# Patient Record
Sex: MALE | Race: WHITE | NOT HISPANIC OR LATINO | Employment: OTHER | URBAN - METROPOLITAN AREA
[De-identification: names, ages, dates, MRNs, and addresses within clinical notes are randomized per-mention and may not be internally consistent; named-entity substitution may affect disease eponyms.]

---

## 2019-11-23 ENCOUNTER — HOSPITAL ENCOUNTER (EMERGENCY)
Facility: CLINIC | Age: 59
Discharge: HOME | End: 2019-11-23
Attending: EMERGENCY MEDICINE | Admitting: EMERGENCY MEDICINE
Payer: COMMERCIAL

## 2019-11-23 VITALS
OXYGEN SATURATION: 99 % | HEART RATE: 62 BPM | RESPIRATION RATE: 18 BRPM | TEMPERATURE: 97.8 F | DIASTOLIC BLOOD PRESSURE: 93 MMHG | SYSTOLIC BLOOD PRESSURE: 155 MMHG | WEIGHT: 230 LBS

## 2019-11-23 DIAGNOSIS — R10.9 ACUTE RIGHT FLANK PAIN: ICD-10-CM

## 2019-11-23 LAB
ALBUMIN UR-MCNC: NEGATIVE MG/DL
ANION GAP SERPL CALCULATED.3IONS-SCNC: 1 MMOL/L (ref 3–14)
APPEARANCE UR: CLEAR
BASOPHILS # BLD AUTO: 0 10E9/L (ref 0–0.2)
BASOPHILS NFR BLD AUTO: 0.5 %
BILIRUB UR QL STRIP: NEGATIVE
BUN SERPL-MCNC: 18 MG/DL (ref 7–30)
CALCIUM SERPL-MCNC: 8.6 MG/DL (ref 8.5–10.1)
CHLORIDE SERPL-SCNC: 106 MMOL/L (ref 94–109)
CO2 SERPL-SCNC: 31 MMOL/L (ref 20–32)
COLOR UR AUTO: YELLOW
CREAT SERPL-MCNC: 0.83 MG/DL (ref 0.66–1.25)
DIFFERENTIAL METHOD BLD: NORMAL
EOSINOPHIL # BLD AUTO: 0.1 10E9/L (ref 0–0.7)
EOSINOPHIL NFR BLD AUTO: 1.5 %
ERYTHROCYTE [DISTWIDTH] IN BLOOD BY AUTOMATED COUNT: 12.4 % (ref 10–15)
GFR SERPL CREATININE-BSD FRML MDRD: >90 ML/MIN/{1.73_M2}
GLUCOSE SERPL-MCNC: 90 MG/DL (ref 70–99)
GLUCOSE UR STRIP-MCNC: NEGATIVE MG/DL
HCT VFR BLD AUTO: 42.7 % (ref 40–53)
HGB BLD-MCNC: 14 G/DL (ref 13.3–17.7)
HGB UR QL STRIP: NEGATIVE
IMM GRANULOCYTES # BLD: 0 10E9/L (ref 0–0.4)
IMM GRANULOCYTES NFR BLD: 0.5 %
KETONES UR STRIP-MCNC: NEGATIVE MG/DL
LEUKOCYTE ESTERASE UR QL STRIP: NEGATIVE
LYMPHOCYTES # BLD AUTO: 2.7 10E9/L (ref 0.8–5.3)
LYMPHOCYTES NFR BLD AUTO: 43.7 %
MCH RBC QN AUTO: 30.2 PG (ref 26.5–33)
MCHC RBC AUTO-ENTMCNC: 32.8 G/DL (ref 31.5–36.5)
MCV RBC AUTO: 92 FL (ref 78–100)
MONOCYTES # BLD AUTO: 0.4 10E9/L (ref 0–1.3)
MONOCYTES NFR BLD AUTO: 6.2 %
NEUTROPHILS # BLD AUTO: 2.9 10E9/L (ref 1.6–8.3)
NEUTROPHILS NFR BLD AUTO: 47.6 %
NITRATE UR QL: NEGATIVE
NRBC # BLD AUTO: 0 10*3/UL
NRBC BLD AUTO-RTO: 0 /100
PH UR STRIP: 6 PH (ref 5–7)
PLATELET # BLD AUTO: 270 10E9/L (ref 150–450)
POTASSIUM SERPL-SCNC: 4.3 MMOL/L (ref 3.4–5.3)
RBC # BLD AUTO: 4.63 10E12/L (ref 4.4–5.9)
SODIUM SERPL-SCNC: 138 MMOL/L (ref 133–144)
SOURCE: NORMAL
SP GR UR STRIP: 1.01 (ref 1–1.03)
UROBILINOGEN UR STRIP-MCNC: 0 MG/DL (ref 0–2)
WBC # BLD AUTO: 6.2 10E9/L (ref 4–11)

## 2019-11-23 PROCEDURE — 80048 BASIC METABOLIC PNL TOTAL CA: CPT | Performed by: EMERGENCY MEDICINE

## 2019-11-23 PROCEDURE — 99283 EMERGENCY DEPT VISIT LOW MDM: CPT | Performed by: EMERGENCY MEDICINE

## 2019-11-23 PROCEDURE — 85025 COMPLETE CBC W/AUTO DIFF WBC: CPT | Performed by: EMERGENCY MEDICINE

## 2019-11-23 PROCEDURE — 81003 URINALYSIS AUTO W/O SCOPE: CPT | Performed by: EMERGENCY MEDICINE

## 2019-11-23 PROCEDURE — 99284 EMERGENCY DEPT VISIT MOD MDM: CPT | Mod: Z6 | Performed by: EMERGENCY MEDICINE

## 2019-11-23 RX ORDER — HYDROCODONE BITARTRATE AND ACETAMINOPHEN 5; 325 MG/1; MG/1
1 TABLET ORAL EVERY 8 HOURS PRN
Qty: 7 TABLET | Refills: 0 | Status: SHIPPED | OUTPATIENT
Start: 2019-11-23 | End: 2023-03-09

## 2019-11-23 RX ORDER — METHYLPREDNISOLONE 4 MG
TABLET, DOSE PACK ORAL
Qty: 21 TABLET | Refills: 0 | Status: SHIPPED | OUTPATIENT
Start: 2019-11-23 | End: 2022-08-24

## 2019-11-23 ASSESSMENT — ENCOUNTER SYMPTOMS
PSYCHIATRIC NEGATIVE: 1
CARDIOVASCULAR NEGATIVE: 1
CONSTITUTIONAL NEGATIVE: 1
NEUROLOGICAL NEGATIVE: 1
GASTROINTESTINAL NEGATIVE: 1
RESPIRATORY NEGATIVE: 1
ALLERGIC/IMMUNOLOGIC NEGATIVE: 1
HEMATOLOGIC/LYMPHATIC NEGATIVE: 1
ENDOCRINE NEGATIVE: 1
EYES NEGATIVE: 1
FLANK PAIN: 1

## 2019-11-23 NOTE — ED AVS SNAPSHOT
[facility] Emergency Department  [address] [address]  Phone:  [phone]  Fax:  [phone]                                    Ren Yung   MRN: [id number]    Department:  [facility] Emergency Department   Date of Visit:  11/23/2019           After Visit Summary Signature Page    I have received my discharge instructions, and my questions have been answered. I have discussed any challenges I see with this plan with the nurse or doctor.    ..........................................................................................................................................  Patient/Patient Representative Signature      ..........................................................................................................................................  Patient Representative Print Name and Relationship to Patient    ..................................................               ................................................  Date                                   Time    ..........................................................................................................................................  Reviewed by Signature/Title    ...................................................              ..............................................  Date                                               Time          22EPIC Rev 08/18

## 2019-11-23 NOTE — ED PROVIDER NOTES
History     Chief Complaint   Patient presents with     Flank Pain     x 1.5 weeks. no injury     HPI  Ren Yung is a 58 year old male who presents to ED for right low back pain. Patient states that this has been going on for 1.5  weeks. Pt states that the pain started at work while he was bending over trying to pull equipment. Patient states that the pain is worse when he bends over and is better when he sits up. Pt denies history of kidney stone. Pt denies nausea, vomiting, hematuria, dysuria, bowel or bladder incontinence, saddle anesthesia, abdominal pain or any other symptoms. Patient states he took Advil and had mild alleviation.     Social History: Pt presents to ED via private vehicle alone. Patient lives in [place], [place]. Patient works in [employer].      Allergies:  Allergies no known allergies    Problem List:    There are no active problems to display for this patient.       Past Medical History:    No past medical history on file.    Past Surgical History:    No past surgical history on file.    Family History:    No family history on file.    Social History:  Marital Status:    Social History     Tobacco Use     Smoking status: Not on file   Substance Use Topics     Alcohol use: Not on file     Drug use: Not on file        Medications:    HYDROcodone-acetaminophen (NORCO) 5-325 MG tablet  methylPREDNISolone (MEDROL DOSEPAK) 4 MG tablet therapy pack          Review of Systems   Constitutional: Negative.    HENT: Negative.    Eyes: Negative.    Respiratory: Negative.    Cardiovascular: Negative.    Gastrointestinal: Negative.    Endocrine: Negative.    Genitourinary: Positive for flank pain.   Skin: Negative.    Allergic/Immunologic: Negative.    Neurological: Negative.    Hematological: Negative.    Psychiatric/Behavioral: Negative.    All other systems reviewed and are negative.      Physical Exam   BP: (!) 155/93  Pulse: 62  Temp: 97.8  F (36.6  C)  Resp: 18  Weight: 104.3 kg (230 lb)  SpO2: 99  %      Physical Exam  Constitutional:       General: He is not in acute distress.     Appearance: He is normal weight. He is not ill-appearing, toxic-appearing or diaphoretic.   HENT:      Head: Normocephalic and atraumatic.      Nose: Nose normal. No congestion or rhinorrhea.      Mouth/Throat:      Pharynx: Oropharynx is clear. No oropharyngeal exudate or posterior oropharyngeal erythema.   Eyes:      General: No scleral icterus.        Right eye: No discharge.         Left eye: No discharge.      Extraocular Movements: Extraocular movements intact.      Conjunctiva/sclera: Conjunctivae normal.      Pupils: Pupils are equal, round, and reactive to light.   Neck:      Musculoskeletal: Normal range of motion. No neck rigidity or muscular tenderness.      Vascular: No carotid bruit.   Cardiovascular:      Rate and Rhythm: Normal rate and regular rhythm.      Pulses: Normal pulses.      Heart sounds: No murmur. No friction rub. No gallop.    Pulmonary:      Effort: Pulmonary effort is normal. No respiratory distress.      Breath sounds: Normal breath sounds. No stridor. No wheezing, rhonchi or rales.   Chest:      Chest wall: No tenderness.   Abdominal:      General: Abdomen is flat. Bowel sounds are normal. There is no distension.      Palpations: There is no mass.      Tenderness: There is no abdominal tenderness. There is no right CVA tenderness, left CVA tenderness, guarding or rebound.      Hernia: No hernia is present.   Musculoskeletal:         General: Tenderness present. No swelling, deformity or signs of injury.      Lumbar back: He exhibits pain.        Back:    Lymphadenopathy:      Cervical: No cervical adenopathy.   Skin:     Capillary Refill: Capillary refill takes less than 2 seconds.      Coloration: Skin is not jaundiced or pale.      Findings: No bruising, erythema, lesion or rash.   Neurological:      General: No focal deficit present.      Mental Status: He is alert.      Cranial Nerves: No  cranial nerve deficit.      Sensory: No sensory deficit.      Motor: No weakness.      Coordination: Coordination normal.      Gait: Gait normal.      Deep Tendon Reflexes: Reflexes normal.   Psychiatric:         Mood and Affect: Mood normal.         Behavior: Behavior normal.         Thought Content: Thought content normal.         Judgment: Judgment normal.         ED Course        Procedures               Critical Care time:  none               ED medications: none      ED Vitals:  Vitals:    11/23/19 1158   BP: (!) 155/93   Pulse: 62   Resp: 18   Temp: 97.8  F (36.6  C)   TempSrc: Temporal   SpO2: 99%   Weight: 104.3 kg (230 lb)     ED labs and imaging:  Results for orders placed or performed during the hospital encounter of 11/23/19 (from the past 24 hour(s))   CBC with platelets differential   Result Value Ref Range    WBC 6.2 4.0 - 11.0 10e9/L    RBC Count 4.63 4.4 - 5.9 10e12/L    Hemoglobin 14.0 13.3 - 17.7 g/dL    Hematocrit 42.7 40.0 - 53.0 %    MCV 92 78 - 100 fl    MCH 30.2 26.5 - 33.0 pg    MCHC 32.8 31.5 - 36.5 g/dL    RDW 12.4 10.0 - 15.0 %    Platelet Count 270 150 - 450 10e9/L    Diff Method Automated Method     % Neutrophils 47.6 %    % Lymphocytes 43.7 %    % Monocytes 6.2 %    % Eosinophils 1.5 %    % Basophils 0.5 %    % Immature Granulocytes 0.5 %    Nucleated RBCs 0 0 /100    Absolute Neutrophil 2.9 1.6 - 8.3 10e9/L    Absolute Lymphocytes 2.7 0.8 - 5.3 10e9/L    Absolute Monocytes 0.4 0.0 - 1.3 10e9/L    Absolute Eosinophils 0.1 0.0 - 0.7 10e9/L    Absolute Basophils 0.0 0.0 - 0.2 10e9/L    Abs Immature Granulocytes 0.0 0 - 0.4 10e9/L    Absolute Nucleated RBC 0.0    Basic metabolic panel   Result Value Ref Range    Sodium 138 133 - 144 mmol/L    Potassium 4.3 3.4 - 5.3 mmol/L    Chloride 106 94 - 109 mmol/L    Carbon Dioxide 31 20 - 32 mmol/L    Anion Gap 1 (L) 3 - 14 mmol/L    Glucose 90 70 - 99 mg/dL    Urea Nitrogen 18 7 - 30 mg/dL    Creatinine 0.83 0.66 - 1.25 mg/dL    GFR Estimate >90  >60 mL/min/[1.73_m2]    GFR Estimate If Black >90 >60 mL/min/[1.73_m2]    Calcium 8.6 8.5 - 10.1 mg/dL   UA reflex to Microscopic   Result Value Ref Range    Color Urine Yellow     Appearance Urine Clear     Glucose Urine Negative NEG^Negative mg/dL    Bilirubin Urine Negative NEG^Negative    Ketones Urine Negative NEG^Negative mg/dL    Specific Gravity Urine 1.012 1.003 - 1.035    Blood Urine Negative NEG^Negative    pH Urine 6.0 5.0 - 7.0 pH    Protein Albumin Urine Negative NEG^Negative mg/dL    Urobilinogen mg/dL 0.0 0.0 - 2.0 mg/dL    Nitrite Urine Negative NEG^Negative    Leukocyte Esterase Urine Negative NEG^Negative    Source Midstream Urine          Assessments & Plan (with Medical Decision Making)   Clinical impression: 58-year-old male who presented for evaluation for a 10-day history of right  flank pain and discomfort.  The cause of his discomfort as described is not clear clinical suspicion is that his pain is likely musculoskeletal in nature with his report that his symptoms began after he bent over to disengage a motor while at work. Patient is a  of a machine [employer].  He reported his symptoms began right after bending over while at work a week and a half ago.  No personal or  family history of kidney stones.  Takes no active prescriptions.  He had no extremity weakness or numbness or difficulty with urinating or defecating and no urinary symptoms.  He has had no fever or chills and no unintentional weight loss.  On exam he was sitting comfortably on the gurney he was somewhat hypertensive on arrival in triage blood pressure 135/93 he was afebrile.  He had no CVA tenderness.  Straight leg raise bilaterally was negativ. He had symmetric lower extremity strength and sensation      ED Course and Plan:  We had a discussion about possible causes of atraumatic right flank pain and discomfort including musculoskeletal causes such as muscle strain, we also discussed the possibility of  lumbar disc disease including herniation, bulge.  His exam was not concerning for sciatica or nerve impingement as he reported no neurologic deficits including weakness or numbness.  We discussed that he could have a kidney stone although his midstream urinalysis was negative for hematuria.  Patient was comfortable going home with a trial of supportive care watchful waiting for further management.  We discussed imaging options including CT and MRI.  At this time we both agree that supportive care measures were reasonable including applying heat to his flank and back which he reported had provided some relief.    He is discharged home with pain medication for comfort x3 days and a Medrol Dosepak.  We discussed that if he develops additional symptoms of concern including but not limited to urinary symptoms, increasing pain difficulty urinating or defecating he should return to the department for further care.  My suspicion that he has an aortic aneurysm is low.            Disclaimer: This note consists of symbols derived from keyboarding, dictation and/or voice recognition software. As a result, there may be errors in the script that have gone undetected. Please consider this when interpreting information found in this chart.  I have reviewed the nursing notes.    I have reviewed the findings, diagnosis, plan and need for follow up with the patient.       New Prescriptions    HYDROCODONE-ACETAMINOPHEN (NORCO) 5-325 MG TABLET    Take 1 tablet by mouth every 8 hours as needed for moderate to severe pain    METHYLPREDNISOLONE (MEDROL DOSEPAK) 4 MG TABLET THERAPY PACK    Follow Package Directions       Final diagnoses:   Acute right flank pain - over the last 1.5 weeks after bending over to dis-engage a motor while at work       11/23/2019   [facility] EMERGENCY DEPARTMENT     Viral Felipe MD  11/24/19 0031

## 2019-11-23 NOTE — DISCHARGE INSTRUCTIONS
1)The cause of your right flank pain is described and reported is not clear with suspect it is likely due to a musculoskeletal process such as a muscle strain.  We have also discussed possible disc related problems such as herniation and bulge.  At this time would agree to allow you to go home with pain medication, rest, heat, and short course of steroids-Medrol Dosepak to see if this will improve or help your pain and discomfort     2) if your pain does not improve in the next week or you develop new symptoms of concern including fever or difficulty urinating creasing pain or discomfort he should make a clinic appointment for additional imaging such as MRI and/or CT return to the department to be reevaluated

## 2020-07-22 ENCOUNTER — TRANSCRIBE ORDERS (OUTPATIENT)
Dept: OTHER | Age: 60
End: 2020-07-22

## 2020-07-22 DIAGNOSIS — L30.9 DERMATITIS: Primary | ICD-10-CM

## 2020-07-29 ENCOUNTER — TELEPHONE (OUTPATIENT)
Dept: DERMATOLOGY | Facility: CLINIC | Age: 60
End: 2020-07-29

## 2020-08-06 ENCOUNTER — VIRTUAL VISIT (OUTPATIENT)
Dept: DERMATOLOGY | Facility: CLINIC | Age: 60
End: 2020-08-06
Payer: COMMERCIAL

## 2020-08-06 DIAGNOSIS — R21 RASH: Primary | ICD-10-CM

## 2020-08-06 RX ORDER — EPINEPHRINE 0.3 MG/.3ML
INJECTION SUBCUTANEOUS
COMMUNITY
Start: 2020-06-17

## 2020-08-06 NOTE — NURSING NOTE
Dermatology Rooming Note    Ren Yung's goals for this visit include:   Chief Complaint   Patient presents with     Derm Problem     Ren is being seen today for a consult on dermatitis on his whole body      PRESLEY Billy

## 2020-08-06 NOTE — PROGRESS NOTES
"[facility] Dermatology Record:  Store and Forward and Video ( Invitation sent by:  Leighton and text to cell phone:   )      Dermatology Problem List:  1. Diffuse rash on bilateral arms, bilateral legs, abdomen, and flanks  - Ddx: Mastocytosis versus folliculitis versus drug eruption  - Will obtain biopsy on 8/14/20 (date may change depending on patient's work schedule)  - Prior tx: Prednisone (60mg x3 days,40x3,20x3)    2. Inflamed Keratosis Pilaris versus Folliculitis  - Last evaluated by Dr. Rogers on 09/15/2015  - Previous tx: Urea cream, diflucan     Encounter Date: Aug 6, 2020    CC:   Chief Complaint   Patient presents with     Derm Problem     Ren is being seen today for a consult on dermatitis on his whole body        History of Present Illness:  Ren Yung is a 59 year old male with a past medical history significant for inflamed keratosis pilaris vs. folliculitis (treated with urea cream and diflucan) who presents for evaluation of a rash.    Patient states that his inflamed keratosis pilaris was constant on his lower shins and asymptomatic, but this changed ~1 year ago, when the rash spread to his abdomen, flanks, and arms. Compared to his previous rash, thus current rash is intensely itchy and when he scratches his body his skin will welt up and burn. He denies any bleeding. He tried cerave and \"non-itch\" lotions, which have not reduced his symptoms.     He saw his family medicine provider, Dr. Barrera on 7/17/20, who started him on oral prednisone (60 mg x 3 days, 40 mg x 3 days, 20 mg x 3 days). This helped calmed the itch slightly, but has not resolved his symptoms.     He denies any fevers or any muscle aches/pains, lip or tongue swelling, or difficulty breathing. Before taking prednisone, he had not taken any medications for multiple years. Of note, his daughter was sick with a fever one week ago, but tested negative for COVID-19.       ROS: Patient is generally feeling well today/  Skin: As per " HPI.  No recent fevers or chills.  No cough or SOB.    Physical Examination:  General: Well-appearing, appropriately-developed individual.  Skin: Focused examination including of the patient-submitted images was performed.   -Diffuse pink-to-red erythematous perifolicular macules and flat-topped papules throughout the bilateral arms, legs, abdomen, and flanks  - Some pink erythematous macules are coalescing into patches on the chest and abdomen.    Labs:  No new labs to report.    Past Medical History:   There is no problem list on file for this patient.    No past medical history on file.  Past Surgical History:   Procedure Laterality Date     COLONOSCOPY  4/18/2013    Procedure: COLONOSCOPY;  Colonoscopy;  Surgeon: Erick Shahid MD;  [facility]       Social History:  Patient reports that he has never smoked. He has never used smokeless tobacco. He reports current alcohol use.     Family History:  Family History   Problem Relation Age of Onset     Melanoma No family hx of        Medications:  Current Outpatient Medications   Medication     EPINEPHrine (ANY BX GENERIC EQUIV) 0.3 MG/0.3ML injection 2-pack     Acetaminophen (TYLENOL PO)     ammonium lactate (LAC-HYDRIN) 12 % cream     cetirizine (ZYRTEC) 10 MG tablet     doxycycline (VIBRAMYCIN) 100 MG capsule     fluconazole (DIFLUCAN) 150 MG tablet     fluconazole (DIFLUCAN) 150 MG tablet     HYDROcodone-acetaminophen (NORCO) 5-325 MG tablet     IBUPROFEN PO     methylPREDNISolone (MEDROL DOSEPAK) 4 MG tablet therapy pack     Sildenafil Citrate (VIAGRA PO)     tamsulosin (FLOMAX) 0.4 MG 24 hr capsule     triamcinolone (KENALOG) 0.1 % cream     Urea (CARMOL 20) 20 % CREA     No current facility-administered medications for this visit.           Allergies   Allergen Reactions     Bee Venom Other (See Comments)     Vision disturbances     Nkda [No Known Drug Allergies]          Impression and Recommendations (Patient Counseled on the Following):  1. Diffuse  rash on bilateral arms, bilateral legs, abdomen, and flanks    Ddx: Mastocytosis versus folliculitis versus drug eruption    Patient presents with a year-long eruption of pink erythematous and perifollicular macules throughout his body, with additional dermatographism. He has not taken medications for years, prior to starting prednisone last month. These symptoms suggest a mastocytosis, but also in the differential is folliculitis (given his history of chronic folliculitis) and drug eruption (although his medication use history is negative). At this point, patient will need to follow up in one week for a biopsy to confirm this diagnosis.     - Return in one week (next Friday based on patient's work schedule) for biopsy. This can be performed by myself or by a different provider.     Follow-up:   Follow-up with dermatology in approximately eight days for biopsy.     Staff and medical student    Zeeshan Paulson, MS4    I was present with the medical student who participated in the interview and in the documentation.  I have verified the history, reviewed all submitted images and personally performed the medical decision making.  I agree with the assessment and plan of care as documented in the note.     Juan Luis Prakash MD  Dermatology Attending    _____________________________________________________________________________    Teledermatology information:  - Location of patient: [place]  - Patient presented as: provider referral  - Location of teledermatologist:  [facility] DERMATOLOGY )  - Reason teledermatology is appropriate:  patient unable to obtain appointment for acute issue (next clinic opening too far away)    - Image quality and interpretability: acceptable  - Physician has received verbal consent for a Video/Photos Visit from the patient? Yes  - In-person dermatology visit recommendation: yes - for biopsy  - Date of images: 08/06/2020  - Service start time:1:03   - Service end time:1:35  - Date of  report: 8/6/2020

## 2020-08-06 NOTE — PROGRESS NOTES
[facility] Dermatology Record:  Mychart Connected      Dermatology Problem List:  ***    Encounter Date: Aug 6, 2020    CC:   Chief Complaint   Patient presents with     Derm Problem     Ren is being seen today for a consult on dermatitis on his whole body        History of Present Illness:  Ren Yung is a 59 year old male who presents for ***.      ROS: Patient is generally feeling well today ***    Physical Examination:  General: Well-appearing***, appropriately-developed individual.  Skin: Focused examination including *** was performed.   -***    Labs:  ***    Past Medical History:   There is no problem list on file for this patient.    No past medical history on file.  Past Surgical History:   Procedure Laterality Date     COLONOSCOPY  4/18/2013    Procedure: COLONOSCOPY;  Colonoscopy;  Surgeon: Erick Shahid MD;  Location: [facility]       Social History:  Patient reports that he has never smoked. He has never used smokeless tobacco. He reports current alcohol use.    Family History:  Family History   Problem Relation Age of Onset     Melanoma No family hx of        Medications:  Current Outpatient Medications   Medication     EPINEPHrine (ANY BX GENERIC EQUIV) 0.3 MG/0.3ML injection 2-pack     Acetaminophen (TYLENOL PO)     ammonium lactate (LAC-HYDRIN) 12 % cream     cetirizine (ZYRTEC) 10 MG tablet     doxycycline (VIBRAMYCIN) 100 MG capsule     fluconazole (DIFLUCAN) 150 MG tablet     fluconazole (DIFLUCAN) 150 MG tablet     HYDROcodone-acetaminophen (NORCO) 5-325 MG tablet     IBUPROFEN PO     methylPREDNISolone (MEDROL DOSEPAK) 4 MG tablet therapy pack     Sildenafil Citrate (VIAGRA PO)     tamsulosin (FLOMAX) 0.4 MG 24 hr capsule     triamcinolone (KENALOG) 0.1 % cream     Urea (CARMOL 20) 20 % CREA     No current facility-administered medications for this visit.           Allergies   Allergen Reactions     Bee Venom Other (See Comments)     Vision disturbances     Nkda [No Known Drug Allergies]   "          Impression and Recommendations (Patient Counseled on the Following):  1. ***  -{plan:748533}    2. ***  -{plan:946725}      Follow-up:   {follow-up:568072}     {umdermtelestaffinvolved:526368::\"Staff only\"}    ***  _____________________________________________________________________________    Teledermatology information:  - Location of patient: [place]  - Patient presented as: {self referral other:080852::\"return\"}  - Location of teledermatologist:  ([facility] DERMATOLOGY )  - Reason teledermatology is appropriate:  {umdermtelereason:024729}  - Image quality and interpretability: {imagequality:262863}  - Physician has received verbal consent for a Video/Photos Visit from the patient? {YES-NO  Default Yes:4444::\"Yes\"}  - In-person dermatology visit recommendation: {visit needed:377742}  - Date of images: ***  - Service start time:***  - Service end time:***  - Date of report: 8/6/2020   "

## 2020-08-06 NOTE — PATIENT INSTRUCTIONS
[facility] Dermatology Visit    Thank you for allowing us to participate in your care.     When should I call my doctor?    If you are worsening or not improving, please, contact us or seek urgent care as noted below.     Who should I call with questions (adults)?    [facility] (adult and pediatric): [phone]     [facility] (adult): [phone]    For urgent needs outside of business hours call the [facility] at [phone] and ask for the dermatology resident on call    If this is a medical emergency and you are unable to reach an ER, Call 911      Who should I call with questions (pediatric)?  [facility]- Pediatric Dermatology  Dr. Kristel Lynn, Dr. Thom Juarez, Dr. Huong García, Helena Carter, VICTOR MANUEL Uribe, Dr. Dorinda Barcenas & Dr. Eliud Parker  Non Urgent  Nurse Triage Line; [phone]- Manuela and Kaitlin DECKER Care Coordinatorsharri Moeller (/Complex ) [phone]    If you need a prescription refill, please contact your pharmacy. Refills are approved or denied by our Physicians during normal business hours, Monday through Fridays  Per office policy, refills will not be granted if you have not been seen within the past year (or sooner depending on your child's condition)    Scheduling Information:  Pediatric Appointment Scheduling and Call Center [phone]  Radiology Scheduling- [phone]  Sedation Unit Scheduling- [phone]  [place] Scheduling- General [phone]; Pediatric Dermatology [phone]  Main  Services: [phone]  Estonian: [phone]  Argentine: [phone]  Hmong/Dharmesh/Cook Islander: [phone]  Preadmission Nursing Department Fax Number: [phone] (Fax all pre-operative paperwork to this number)    For urgent matters arising during evenings, weekends, or holidays that cannot wait for normal business hours  please call [phone] and ask for the Dermatology Resident On-Call to be paged.

## 2020-08-06 NOTE — LETTER
"8/6/2020       RE: Ren Yung  [address]  [facility] [address]     Dear Colleague,    Thank you for referring your patient, Ren Yung, to the [facility] DERMATOLOGY at [facility]. Please see a copy of my visit note below.    [facility] Dermatology Record:  Store and Forward and Video ( Invitation sent by:  Leighton and text to cell phone:   )      Dermatology Problem List:  1. Diffuse rash on bilateral arms, bilateral legs, abdomen, and flanks  - Ddx: Mastocytosis versus folliculitis versus drug eruption  - Will obtain biopsy on 8/14/20 (date may change depending on patient's work schedule)  - Prior tx: Prednisone (60mg x3 days,40x3,20x3)    2. Inflamed Keratosis Pilaris versus Folliculitis  - Last evaluated by Dr. Rogers on 09/15/2015  - Previous tx: Urea cream, diflucan     Encounter Date: Aug 6, 2020    CC:   Chief Complaint   Patient presents with     Derm Problem     Ren is being seen today for a consult on dermatitis on his whole body        History of Present Illness:  Ren Yung is a 59 year old male with a past medical history significant for inflamed keratosis pilaris vs. folliculitis (treated with urea cream and diflucan) who presents for evaluation of a rash.    Patient states that his inflamed keratosis pilaris was constant on his lower shins and asymptomatic, but this changed ~1 year ago, when the rash spread to his abdomen, flanks, and arms. Compared to his previous rash, thus current rash is intensely itchy and when he scratches his body his skin will welt up and burn. He denies any bleeding. He tried cerave and \"non-itch\" lotions, which have not reduced his symptoms.     He saw his family medicine provider, Dr. Barrera on 7/17/20, who started him on oral prednisone (60 mg x 3 days, 40 mg x 3 days, 20 mg x 3 days). This helped calmed the itch slightly, but has not resolved his symptoms.     He denies any fevers or any muscle aches/pains, lip " or tongue swelling, or difficulty breathing. Before taking prednisone, he had not taken any medications for multiple years. Of note, his daughter was sick with a fever one week ago, but tested negative for COVID-19.       ROS: Patient is generally feeling well today/  Skin: As per HPI.  No recent fevers or chills.  No cough or SOB.    Physical Examination:  General: Well-appearing, appropriately-developed individual.  Skin: Focused examination including of the patient-submitted images was performed.   -Diffuse pink-to-red erythematous perifolicular macules and flat-topped papules throughout the bilateral arms, legs, abdomen, and flanks  - Some pink erythematous macules are coalescing into patches on the chest and abdomen.    Labs:  No new labs to report.    Past Medical History:   There is no problem list on file for this patient.    No past medical history on file.  Past Surgical History:   Procedure Laterality Date     COLONOSCOPY  4/18/2013    Procedure: COLONOSCOPY;  Colonoscopy;  Surgeon: Erick Shahid MD;  Location: [facility]       Social History:  Patient reports that he has never smoked. He has never used smokeless tobacco. He reports current alcohol use.     Family History:  Family History   Problem Relation Age of Onset     Melanoma No family hx of        Medications:  Current Outpatient Medications   Medication     EPINEPHrine (ANY BX GENERIC EQUIV) 0.3 MG/0.3ML injection 2-pack     Acetaminophen (TYLENOL PO)     ammonium lactate (LAC-HYDRIN) 12 % cream     cetirizine (ZYRTEC) 10 MG tablet     doxycycline (VIBRAMYCIN) 100 MG capsule     fluconazole (DIFLUCAN) 150 MG tablet     fluconazole (DIFLUCAN) 150 MG tablet     HYDROcodone-acetaminophen (NORCO) 5-325 MG tablet     IBUPROFEN PO     methylPREDNISolone (MEDROL DOSEPAK) 4 MG tablet therapy pack     Sildenafil Citrate (VIAGRA PO)     tamsulosin (FLOMAX) 0.4 MG 24 hr capsule     triamcinolone (KENALOG) 0.1 % cream     Urea (CARMOL 20) 20 % CREA     No  current facility-administered medications for this visit.           Allergies   Allergen Reactions     Bee Venom Other (See Comments)     Vision disturbances     Nkda [No Known Drug Allergies]          Impression and Recommendations (Patient Counseled on the Following):  1. Diffuse rash on bilateral arms, bilateral legs, abdomen, and flanks    Ddx: Mastocytosis versus folliculitis versus drug eruption    Patient presents with a year-long eruption of pink erythematous and perifollicular macules throughout his body, with additional dermatographism. He has not taken medications for years, prior to starting prednisone last month. These symptoms suggest a mastocytosis, but also in the differential is folliculitis (given his history of chronic folliculitis) and drug eruption (although his medication use history is negative). At this point, patient will need to follow up in one week for a biopsy to confirm this diagnosis.     - Return in one week (next Friday based on patient's work schedule) for biopsy. This can be performed by myself or by a different provider.     Follow-up:   Follow-up with dermatology in approximately eight days for biopsy.     Staff and medical student    Zeeshan Paulson, MS4    I was present with the medical student who participated in the interview and in the documentation.  I have verified the history, reviewed all submitted images and personally performed the medical decision making.  I agree with the assessment and plan of care as documented in the note.     Juan Luis Prakash MD  Dermatology Attending    _____________________________________________________________________________    Teledermatology information:  - Location of patient: [place]  - Patient presented as: provider referral  - Location of teledermatologist:  ([facility] DERMATOLOGY )  - Reason teledermatology is appropriate:  patient unable to obtain appointment for acute issue (next clinic opening too far away)    - Image quality  and interpretability: acceptable  - Physician has received verbal consent for a Video/Photos Visit from the patient? Yes  - In-person dermatology visit recommendation: yes - for biopsy  - Date of images: 08/06/2020  - Service start time:1:03   - Service end time:1:35  - Date of report: 8/6/2020       Again, thank you for allowing me to participate in the care of your patient.      Sincerely,    Juan Luis Prakash MD

## 2020-08-11 ENCOUNTER — OFFICE VISIT (OUTPATIENT)
Dept: DERMATOLOGY | Facility: CLINIC | Age: 60
End: 2020-08-11
Payer: COMMERCIAL

## 2020-08-11 DIAGNOSIS — R21 RASH: ICD-10-CM

## 2020-08-11 DIAGNOSIS — D47.09 MASTOCYTOSIS: ICD-10-CM

## 2020-08-11 DIAGNOSIS — R21 RASH: Primary | ICD-10-CM

## 2020-08-11 RX ORDER — TRIAMCINOLONE ACETONIDE 1 MG/G
OINTMENT TOPICAL 2 TIMES DAILY
Qty: 80 G | Refills: 0 | Status: SHIPPED | OUTPATIENT
Start: 2020-08-11 | End: 2022-08-24

## 2020-08-11 ASSESSMENT — PAIN SCALES - GENERAL
PAINLEVEL: NO PAIN (0)
PAINLEVEL: NO PAIN (0)

## 2020-08-11 NOTE — NURSING NOTE
Chief Complaint   Patient presents with     Derm Problem     Ren is here today for a biopsy.      BETSY YODER on 8/11/2020 at 10:56 AM

## 2020-08-11 NOTE — PROGRESS NOTES
[facility] Dermatology Note      Dermatology Problem List:  1. Pruritic follicular based macular rash suspicious for mastocytosis.   - s/p biopsy 8/11/2020  - prior tx: prednisone taper   2. Inflamed KP vs folliculitis, last evaluated by Dr. Rogers on 9/15/2015.    - prior tx: urea cream, diflucan     Encounter Date: Aug 11, 2020    CC:   Chief Complaint   Patient presents with     Derm Problem     Ren is here today for a biopsy.          History of Present Illness:  Mr. Ren Yung is a 59 year old male who presents for biopsy of rash.  Seen via telederm onf 8/6/2020.  Patient has had ~3 year history of inflamed keratosis pilaris vs folliculitis treated with urea cream and diflucan.  In the past year, his rash spread to his abdomen, flanks and arms.  It feels extremely itchy and burning.      Of note, he has had a severe reaction to a bee sting many years ago.     Patient has not been taking any medications for the past year or more except for the methylprednisolone which he stopped taking 2 weeks ago.      Denies any fevers, cough, shortness of breath, throat swelling, muscle aches or pains, lip or tongue swelling.     Past Medical History:   There is no problem list on file for this patient.    No past medical history on file.  Past Surgical History:   Procedure Laterality Date     COLONOSCOPY  4/18/2013    Procedure: COLONOSCOPY;  Colonoscopy;  Surgeon: Erick Shahid MD;  Location: [facility]       Social History:  Patient reports that he has never smoked. He has never used smokeless tobacco. He reports current alcohol use.    Family History:  Family History   Problem Relation Age of Onset     Melanoma No family hx of        Medications:  Current Outpatient Medications   Medication Sig Dispense Refill     EPINEPHrine (ANY BX GENERIC EQUIV) 0.3 MG/0.3ML injection 2-pack INJECT 0.3MG IM ONCE AS A SINGLE DOSE       IBUPROFEN PO Take 200 mg by mouth.       Acetaminophen (TYLENOL PO) Take   by mouth.       ammonium lactate (LAC-HYDRIN) 12 % cream Apply topically 2 times daily as needed for dry skin (Patient not taking: Reported on 8/6/2020) 385 g 3     cetirizine (ZYRTEC) 10 MG tablet Take 1 tablet (10 mg) by mouth every evening (Patient not taking: Reported on 8/6/2020) 30 tablet 1     doxycycline (VIBRAMYCIN) 100 MG capsule Take 1 capsule (100 mg) by mouth daily with food (Patient not taking: Reported on 8/6/2020) 30 capsule 0     fluconazole (DIFLUCAN) 150 MG tablet Take 1 tablet (150 mg) by mouth daily (Patient not taking: Reported on 8/6/2020) 14 tablet 0     fluconazole (DIFLUCAN) 150 MG tablet Take 1 tablet (150 mg) by mouth once a week (Patient not taking: Reported on 8/6/2020) 4 tablet 0     HYDROcodone-acetaminophen (NORCO) 5-325 MG tablet Take 1 tablet by mouth every 8 hours as needed for moderate to severe pain (Patient not taking: Reported on 8/6/2020) 7 tablet 0     methylPREDNISolone (MEDROL DOSEPAK) 4 MG tablet therapy pack Follow Package Directions (Patient not taking: Reported on 8/6/2020) 21 tablet 0     Sildenafil Citrate (VIAGRA PO) Take 100 mg by mouth.       tamsulosin (FLOMAX) 0.4 MG 24 hr capsule Take 1 capsule (0.4 mg) by mouth daily (Patient not taking: Reported on 8/6/2020) 30 capsule 3     triamcinolone (KENALOG) 0.1 % cream Apply sparingly to affected area at bedtime (Patient not taking: Reported on 8/6/2020) 80 g 3     Urea (CARMOL 20) 20 % CREA Apply daily in morning (Patient not taking: Reported on 8/6/2020) 120 g 3        Allergies   Allergen Reactions     Bee Venom Other (See Comments)     Vision disturbances     Nkda [No Known Drug Allergies]          Review of Systems:  -As per HPI  -Constitutional: Otherwise feeling well today, in usual state of health.  -HEENT: Patient denies nonhealing oral sores.  -Skin: As above in HPI. No additional skin concerns.    Physical exam:  Vitals: There were no vitals taken for this visit.  GEN: This is a well developed,  well-nourished male in no acute distress, in a pleasant mood.    SKIN: Total skin excluding the undergarment areas was performed. The exam included the head/face, neck, both arms, chest, back, abdomen, both legs, digits and/or nails.   - reddish brown follicular based macules coalescing into plaques on the thighs, trunk and upper extremities   -No other lesions of concern on areas examined.     Impression/Plan:  1. Pruritic follicular based macular rash suspicious for mastocytosis.  Other DDx includes pigmented purpura vs inflamed keratosis pilaris vs less likely drug reaction as patient ceased all meds for the past year and rash has continued to spread.  - Punch biopsy:  After discussion of benefits and risks including but not limited to bleeding/bruising, pain/swelling, infection, scar, incomplete removal, nerve damage/numbness, recurrence, and non-diagnostic biopsy, written consent, verbal consent and photographs were obtained. Time-out was performed. The area was cleaned with isopropyl alcohol. 0.5mL of 1% lidocaine with 1:100,000 epinephrine was injected to obtain adequate anesthesia of the lesion on the left chest and the left arm.  A 3 mm punch biopsy was performed x2. 4-0 prolene sutures were utilized to approximate the epidermal edges. White petroleum jelly/Vaseline and a bandage was applied to the wound. Explicit verbal and written wound care instructions were provided. The patient left the Dermatology Clinic in good condition. The patient was counseled to follow up for suture removal in approximately 7-14 days.  - Labs: serum tryptase  - Start triamcinolone ointment bid while biopsies pending; consider nbUVB if mastocytosis is correct dx    CC Referred Self, MD  No address on file on close of this encounter.  Follow-up with dermatopathology results       Dr. Prakash staffed the patient.    Katerina Pablo MD  Dermatology Resident, PGY2    Attending Note  Biopsies confirmed our suspicion of  mastocytosis, and serum tryptase is elevated, so we will refer patient to heme-onc for further systemic workup.    I have seen and examined this patient and agree with the assessment and plan as documented in the resident's note, and was present for all procedures.    Juan Luis Praaksh MD  Dermatology Attending

## 2020-08-11 NOTE — PROGRESS NOTES
Lidocaine-epinephrine 1-1:333568 % injection   0.5mL once for one use, starting 8/11/2020 ending 8/11/2020,  2mL disp, R-0, injection  Injected by Dr. Pablo

## 2020-08-11 NOTE — PATIENT INSTRUCTIONS
[facility] Dermatology Visit    Thank you for allowing us to participate in your care. Your findings, instructions and follow-up plan are as follows:         When should I call my doctor?    If you are worsening or not improving, please, contact us or seek urgent care as noted below.     Who should I call with questions (adults)?    [facility] (adult and pediatric): [phone]     [facility] (adult): [phone]    For urgent needs outside of business hours call the [facility] at [phone] and ask for the dermatology resident on call    If this is a medical emergency and you are unable to reach an ER, Call 911      Who should I call with questions (pediatric)?  [facility]- Pediatric Dermatology  Dr. Kristel Lynn, Dr. Thom Juarez, Dr. Huong García, Helena Carter, PA  Dr. Dona Uribe, Dr. Dorinda Barcenas & Dr. Eliud Parker  Non Urgent  Nurse Triage Line; [phone]- Manuela and Kaitlin RN Care Coordinators   [place] (/Complex ) [phone]    If you need a prescription refill, please contact your pharmacy. Refills are approved or denied by our Physicians during normal business hours, Monday through Fridays  Per office policy, refills will not be granted if you have not been seen within the past year (or sooner depending on your child's condition)    Scheduling Information:  Pediatric Appointment Scheduling and Call Center [phone]  Radiology Scheduling- [phone]  Sedation Unit Scheduling- [phone]  [place] Scheduling- General [phone]; Pediatric Dermatology [phone]  Main  Services: [phone]  Indonesian: [phone]  Guamanian: [phone]  Hmong/Amharic/Spanish: [phone]  Preadmission Nursing Department Fax Number: [phone] (Fax all pre-operative paperwork to this number)    For urgent matters arising during evenings,  weekends, or holidays that cannot wait for normal business hours please call [phone] and ask for the Dermatology Resident On-Call to be paged.  Wound Care After a Biopsy    What is a skin biopsy?  A skin biopsy allows the doctor to examine a very small piece of tissue under the microscope to determine the diagnosis and the best treatment for the skin condition. A local anesthetic (numbing medicine)  is injected with a very small needle into the skin area to be tested. A small piece of skin is taken from the area. Sometimes a suture (stitch) is used.     What are the risks of a skin biopsy?  I will experience scar, bleeding, swelling, pain, crusting and redness. I may experience incomplete removal or recurrence. Risks of this procedure are excessive bleeding, bruising, infection, nerve damage, numbness, thick (hypertrophic or keloidal) scar and non-diagnostic biopsy.    How should I care for my wound for the first 24 hours?    Keep the wound dry and covered for 24 hours    If it bleeds, hold direct pressure on the area for 15 minutes. If bleeding does not stop then go to the emergency room    Avoid strenuous exercise the first 1-2 days or as your doctor instructs you    How should I care for the wound after 24 hours?    After 24 hours, remove the bandage    You may bathe or shower as normal    If you had a scalp biopsy, you can shampoo as usual and can use shower water to clean the biopsy site daily    Clean the wound twice a day with gentle soap and water    Do not scrub, be gentle    Apply white petroleum/Vaseline after cleaning the wound with a cotton swab or a clean finger, and keep the site covered with a Bandaid /bandage. Bandages are not necessary with a scalp biopsy    If you are unable to cover the site with a Bandaid /bandage, re-apply ointment 2-3 times a day to keep the site moist. Moisture will help with healing    Avoid strenuous activity for first 1-2 days    Avoid lakes, rivers, pools, and  oceans until the stitches are removed or the site is healed    How do I clean my wound?    Wash hands thoroughly with soap or use hand  before all wound care    Clean the wound with gentle soap and water    Apply white petroleum/Vaseline  to wound after it is clean    Replace the Bandaid /bandage to keep the wound covered for the first few days or as instructed by your doctor    If you had a scalp biopsy, warm shower water to the area on a daily basis should suffice    What should I use to clean my wound?     Cotton-tipped applicators (Qtips )    White petroleum jelly (Vaseline ). Use a clean new container and use Q-tips to apply.    Bandaids   as needed    Gentle soap     How should I care for my wound long term?    Do not get your wound dirty    Keep up with wound care for one week or until the area is healed.    A small scab will form and fall off by itself when the area is completely healed. The area will be red and will become pink in color as it heals. Sun protection is very important for how your scar will turn out. Sunscreen with an SPF 30 or greater is recommended once the area is healed.    If you have stitches, stitches need to be removed in 10 days. You may return to our clinic for this or you may have it done locally at your doctor s office.    You should have some soreness but it should be mild and slowly go away over several days. Talk to your doctor about using tylenol for pain,    When should I call my doctor?  If you have increased:     Pain or swelling    Pus or drainage (clear or slightly yellow drainage is ok)    Temperature over 100F    Spreading redness or warmth around wound    When will I hear about my results?  The biopsy results can take 2-3 weeks to come back. The clinic will call you with the results, send you a [employer] message, or have you schedule a follow-up clinic or phone time to discuss the results. Contact our clinics if you do not hear from us in 3 weeks.     Who should  I call with questions?    [facility] [place]: [phone]     [facility]: [phone]    For urgent needs outside of business hours call the [facility] at [phone] and ask for the dermatology resident on call

## 2020-08-12 LAB — COPATH REPORT: NORMAL

## 2020-08-14 LAB — TRYPTASE SERPL-MCNC: 114 UG/L

## 2020-08-19 PROBLEM — D47.09 MASTOCYTOSIS: Status: ACTIVE | Noted: 2020-08-19

## 2020-08-19 PROBLEM — R21 RASH: Status: ACTIVE | Noted: 2020-08-19

## 2020-08-21 ENCOUNTER — DOCUMENTATION ONLY (OUTPATIENT)
Dept: DERMATOLOGY | Facility: CLINIC | Age: 60
End: 2020-08-21

## 2020-08-21 ENCOUNTER — MEDICAL CORRESPONDENCE (OUTPATIENT)
Dept: HEALTH INFORMATION MANAGEMENT | Facility: CLINIC | Age: 60
End: 2020-08-21

## 2020-08-21 NOTE — TELEPHONE ENCOUNTER
RECORDS STATUS - ALL OTHER DIAGNOSIS      RECORDS RECEIVED FROM: [facility] -  Internal Referral   DATE RECEIVED: 8/21/20   NOTES STATUS DETAILS   OFFICE NOTE from referring provider Epic Dr. Prakash: 8/11/20   OFFICE NOTE from medical oncologist     DISCHARGE SUMMARY from hospital     DISCHARGE REPORT from the ER     OPERATIVE REPORT     MEDICATION LIST     CLINICAL TRIAL TREATMENTS TO DATE     LABS     PATHOLOGY REPORTS [facility] 8/11/20, 6/16/15   ANYTHING RELATED TO DIAGNOSIS     GENONOMIC TESTING     TYPE:     IMAGING (NEED IMAGES & REPORT)     CT SCANS     MRI     MAMMO     ULTRASOUND     PET

## 2020-08-21 NOTE — PROGRESS NOTES
Faxed [facility] home phototherapy order form, insurance information, and letter of medical necessity to [facility] at [phone]

## 2020-09-14 ENCOUNTER — TELEPHONE (OUTPATIENT)
Dept: DERMATOLOGY | Facility: CLINIC | Age: 60
End: 2020-09-14

## 2020-09-14 NOTE — TELEPHONE ENCOUNTER
[facility] Call Center    Phone Message    May a detailed message be left on voicemail: yes     Reason for Call: Other: Pt just got phototherapy light and doesn't know how long to use it for.  Please call     Action Taken: Message routed to:  [facility] dermatology    Travel Screening: Not Applicable

## 2020-09-15 NOTE — TELEPHONE ENCOUNTER
Left a voicemail for Ren informing him to follow the skin type 2, psoriasis protocol.  Clinic number provided to call back with any questions.

## 2020-09-29 NOTE — PROGRESS NOTES
"Ren Yung is a 59 year old male who is being evaluated via a billable video visit.      The patient has been notified of following:     \"This video visit will be conducted via a call between you and your physician/provider. We have found that certain health care needs can be provided without the need for an in-person physical exam.  This service lets us provide the care you need with a video conversation.  If a prescription is necessary we can send it directly to your pharmacy.  If lab work is needed we can place an order for that and you can then stop by our lab to have the test done at a later time.    Video visits are billed at different rates depending on your insurance coverage.  Please reach out to your insurance provider with any questions.    If during the course of the call the physician/provider feels a video visit is not appropriate, you will not be charged for this service.\"    Patient has given verbal consent for Video visit? Yes  How would you like to obtain your AVS? MyChart  If you are dropped from the video visit, the video invite should be resent to: Text to cell phone: [phone]  Will anyone else be joining your video visit? No       PRESLEY Moore        Video-Visit Details    Type of service:  Video Visit    Video Start Time: 10:08 am  Video End Time: 10:30 am    Originating Location (pt. Location): Home    Distant Location (provider location):  [facility]     Platform used for Video Visit: [facility]      Hematology Clinic consult note    Reasons for Consult: -mastocytosis    History of Present Illness: Mr. Yung is a 59 year old man with a history of rash since about 2014 that is been slowly progressive.  He said it started with some dots on his arms and some pruritus but eventually have spread everywhere.  When he scratches himself, there was a red welts and it burns.  He was seen by Dr. Juan Luis Prakash in dermatology, and a skin biopsy showed that it is " mastocytosis. He is seen for evaluation for systemic mastocytosis. He has a h/o anaphylaxis to bee sting about 15 years ago that required an overnight hospitalization.  Since that time, he has an EpiPen available.  He is not allergic to anything else that he is aware of.  He has been on some prednisone in the past for the itching, he said it helped only a little bit, so he is not on any now.  Benadryl helped a little bit with the itching.  He does use some lotions which are helpful.    He denies any fevers, chills, sweats, anorexia.  No coughing or wheezing.  No chest pain or palpitations.  No heartburn symptoms.  No nausea, vomiting, or early satiety.  No diarrhea.    Past Medical History:  - Rash as above  - History of anaphylaxis to an insect venom  -History of diverticulitis  -Varicose veins, no DVT  -Obesity    Medications:   EpiPen  Ibuprofen PRN  Triamcinolone ointment twice daily PRN    Family History: Father- of cancer, lung and prostate, he was a smoker.  Mother coronary artery disease,  siblings-one brother with hypertension and DVT, sister healthy.  No family history of skin rashes or autoimmune disorders.    Social History: smoking-never, alcohol- 2 beers or wine on the weekends, few during the week for a total of about 6 drinks per week.  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    Review of systems:  As in HPI.  The rest of the > 10 point review of systems was negative.      Physical exam:    General appearance:  Patient is 59 year old man in no acute distress.     HEENT:  No pallor, icterus.   Lungs: Normal respirations, no cough or audible wheezes.     Skin: No obvious rash on face and neck, but he held up his arm for me which showed numerous purplish dots which he says is typical for his rash.    The rest of a comprehensive physical examination is deferred due to public health emergency video visit restrictions.    Labs:  Results for ALEA GUTIERREZ (MRN [id number]) as of 2020  16:46   Ref. Range 8/11/2020 12:28   Tryptase ug/L Latest Ref Range: <11.0 ug/L 114.0 (H)     Results for ALEA GUTIERREZ (MRN [id number]) as of 9/30/2020 16:46   Ref. Range 11/23/2019 12:42   Sodium Latest Ref Range: 133 - 144 mmol/L 138   Potassium Latest Ref Range: 3.4 - 5.3 mmol/L 4.3   Chloride Latest Ref Range: 94 - 109 mmol/L 106   Carbon Dioxide Latest Ref Range: 20 - 32 mmol/L 31   Urea Nitrogen Latest Ref Range: 7 - 30 mg/dL 18   Creatinine Latest Ref Range: 0.66 - 1.25 mg/dL 0.83   GFR Estimate Latest Ref Range: >60 mL/min/1.73_m2 >90   GFR Estimate If Black Latest Ref Range: >60 mL/min/1.73_m2 >90   Calcium Latest Ref Range: 8.5 - 10.1 mg/dL 8.6   Anion Gap Latest Ref Range: 3 - 14 mmol/L 1 (L)   Glucose Latest Ref Range: 70 - 99 mg/dL 90   WBC Latest Ref Range: 4.0 - 11.0 10e9/L 6.2   Hemoglobin Latest Ref Range: 13.3 - 17.7 g/dL 14.0   Hematocrit Latest Ref Range: 40.0 - 53.0 % 42.7   Platelet Count Latest Ref Range: 150 - 450 10e9/L 270   RBC Count Latest Ref Range: 4.4 - 5.9 10e12/L 4.63   MCV Latest Ref Range: 78 - 100 fl 92   MCH Latest Ref Range: 26.5 - 33.0 pg 30.2   MCHC Latest Ref Range: 31.5 - 36.5 g/dL 32.8   RDW Latest Ref Range: 10.0 - 15.0 % 12.4   Diff Method Unknown Automated Method   % Neutrophils Latest Units: % 47.6   % Lymphocytes Latest Units: % 43.7   % Monocytes Latest Units: % 6.2   % Eosinophils Latest Units: % 1.5   % Basophils Latest Units: % 0.5   % Immature Granulocytes Latest Units: % 0.5   Nucleated RBCs Latest Ref Range: 0 /100 0   Absolute Neutrophil Latest Ref Range: 1.6 - 8.3 10e9/L 2.9   Absolute Lymphocytes Latest Ref Range: 0.8 - 5.3 10e9/L 2.7   Absolute Monocytes Latest Ref Range: 0.0 - 1.3 10e9/L 0.4   Absolute Eosinophils Latest Ref Range: 0.0 - 0.7 10e9/L 0.1   Absolute Basophils Latest Ref Range: 0.0 - 0.2 10e9/L 0.0   Abs Immature Granulocytes Latest Ref Range: 0 - 0.4 10e9/L 0.0   Absolute Nucleated RBC Unknown 0.0       Patient Name: ALEA GUTIERREZ    MR#: [id number]   Specimen #: [id number]   Collected: 8/11/2020   Received: 8/11/2020   Reported: 8/12/2020 20:47   Ordering Phy(s): LEO MADRIGAL     For improved result formatting, select 'View Enhanced Report Format' under    Linked Documents section.     SPECIMEN(S):   A: Skin, left arm, punch   B: Skin, left chest, punch     FINAL DIAGNOSIS:   A. Skin, left arm, punch:   - Features consistent with cutaneous mastocytosis - (see comment)     B. Skin, left chest, punch:   - Features consistent with cutaneous mastocytosis - (see comment)     COMMENT:   A-B:  highlights many papillary dermal perivascular mast cells,   supporting this diagnosis.     I have personally reviewed all specimens and/or slides, including the   listed special stains, and used them   with my medical judgement to determine or confirm the final diagnosis.     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  The question is whether he has systemic mastocytosis.  He does have a markedly elevated tryptase level.  He does not have any of the symptoms that one often thinks of with systemic mastocytosis-no heartburn or abdominal pain or wheezing or lightheadedness that would go along with a histamine release, other than the itching.  However it is important to determine whether he does have systemic involvement.  I suspect it is not one of the aggressive forms, since he reports having symptoms for many years.    The most important test is to do a bone marrow biopsy and aspirate looking for mast cells in the bone marrow. Also molecular studies are very important, in particular KIT D816V mutation analysis.  This can be done on blood, but the bone marrow is more sensitive.  Bone marrow biopsy will also tell us that he has a more aggressive form.  If he has systemic mastocytosis he has  the  [id number] mutation the midsternum would be the treatment, if it is not mutated then imatinib could be considered.  Hypereosinophilic syndrome should also be considered in the differential, although the skin biopsy goes against that.    Also patients with mastocytosis can have decreased bone density, so is important to check bone density scan and start treatment with vitamin D and calcium as indicated.    I discussed with him that the bone marrow is one of the key issues here and I will be able to tell him a lot more about what we do to try to treat him and manage his symptoms once we have the biopsy results.  We will try to arrange for that within the next couple weeks at the clinic and surgical center.  He also needs a CBC with differential, comprehensive metabolic panel, LD, retake, beta-2 microglobulin.  These Q drawn the same day as phono biopsy    I will be in touch with him regarding results and we can arrange follow-up from there.    Yadira Sharpe MD  Hematology    Addendum: I discussed results with Mr. Yung and his wife by phone on 11/2/2020.  The bone marrow biopsy and aspirate shows approximately 15% mast cells, some of them in clusters.  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  There was no increase in blasts or other abnormal looking cells.  FISH showed no rearrangement of PDGFRA or KIT.  I discussed with him at this time, it is best to treat his symptoms of the pruritus and flushing symptomatically with H1/H2 blockers.  He should continue with his lotions and the light therapy that he recently started.  I would like to avoid chemotherapy at this time.  If he eventually needs chemotherapy, Menostar and would be the likely choice.  With the kids D816V mutation he will not respond to imatinib.  I will have him follow-up with me in about 6 months, but he should call if he is having increasing symptoms.  He should also take the daily vitamin D for the osteoporosis.  Prescriptions sent in for cetirizine 10  mg twice daily and famotidine 20 mg twice daily.  Yadira Sharpe MD    Bone marrow biopsy results:  Patient Name: ALEA GUTIERREZ   MR#: [id number]   Specimen #: [id number]   Collected: 10/16/2020   Received: 10/16/2020   Reported: 10/19/2020 19:31   Ordering Phy(s): MARTINEZ SALAZAR   Additional Phy(s): Copy to Cytogenetics     For improved result formatting, select 'View Enhanced Report Format' under    Linked Documents section.     +++ORIGINAL REPORT FOLLOWS ADDENDUM+++   ADDENDUM     TO ORIGINAL REPORT   Status: Signed Out   Date Ordered:10/27/2020   Date Complete:10/27/2020   Date Reported:10/27/2020 10:43   Signed Out By: Kaitlynn Ozuna MD     INTERPRETATION:   The original diagnosis is unchanged.     COMMENTS:   This addendum is issued to integrate the results of molecular studies. NGS    detected a pathogenetic mutation in   codon 816 of the KIT gene. This finding supports the original diagnosis of    systemic mastocytosis.     __________________________________________   ADDENDUM     TO ORIGINAL REPORT   Status: Signed Out   Date Ordered:10/21/2020   Date Complete:10/21/2020   Date Reported:10/21/2020 20:39   Signed Out By: Kaitlynn Ozuna MD     INTERPRETATION:   The original interpretation is unchanged.     COMMENTS:   This addendum is issued to document the results of an additional   immunohistochemical stain performed on the   trephine core sections. CD25 is strongly positive among the scattered and   aggregated mast cells seen in the   marrow biopsy, supporting the original diagnosis of systemic mastocytosis.     __________________________________________     ORIGINAL REPORT:     TEST(S):   Unilateral Bone Marrow Biopsy/Aspiration     FINAL DIAGNOSIS:   Bone marrow, posterior iliac crest, left decalcified trephine biopsy and   touch imprint; left particle crush,   direct aspirate smear, and concentrated aspirate smear; and peripheral   blood smear:     - Marrow involved by systemic mastocytosis (see  "comment)     - Normocellular marrow (cellularity estimated at 40-50%) with trilineage    hematopoietic maturation, no   increase in blasts, and multifocal dense aggregates of mast cells     - Peripheral blood showing a normal hemogram and leukocyte differential     COMMENT:   Occasional dense aggregates of mast cells are present on the core biopsy;   the mast cells in these aggregates   show atypical \"spindled\" morphology and express weak CD2 by   immunohistochemistry. Rare to occasional mast   cells are also seen in the aspirate and particle crush preparations.   Concurrent flow cytometry ([id number])   performed on bone marrow showed an abnormal mast cell population, with no   increase in myeloid blasts and no   abnormal myeloid blast population. Final classification requires   correlation with the results of genetic and   other laboratory studies and the clinical features.     I have personally reviewed all specimens and/or slides, including the   listed special stains, and used them   with my medical judgment to determine the final diagnosis.     Electronically signed out by:   Kaitlynn Ozuna MD     Technical testing/processing performed at [place], [place]     CLINICAL HISTORY:   Per [facility] medical records: 59-year-old male with no significant past   medical history presented to dermatology   with a slowly progressive itchy rash since 2014. Patient underwent punch   biopsy ([id number], 8/11/20) and was   subsequently diagnosed with cutaneous mastocytosis. Diagnostic bone marrow    biopsy is obtained for evaluation.     REPORT:   Procedure/Gross Description   Aspirate(s) and trephine(s) procured by VICTOR MANUEL Cardoso     Specimen sent for Special Studies:        Flow Cytometry (left aspirate)        Cytogenetics (left aspirate)        Molecular Diagnostics (left aspirate)     Biopsy aspiration site: Left posterior iliac crest                  (Reference Range) "          Amount of aspirate              4.5      mL        Fat and P.V. cell layer           1      %     (1 - 3)        Particles                        1      %        Myeloid-erythroid layer          2      %     (5 - 8)          Clot Section: no     Trephine biopsy site: unilateral     Designated left (B) posterior iliac crest is 1 cylinder of gritty tissue,   labeled with the patient's name and   hospital number, obtained with 11 gauge needle, and having a length of 13   mm; entirely submitted in 1   cassette; acetic zinc formalin fixed, decalcified, processed, and stained   with hematoxylin and eosin per   laboratory protocol.     PERIPHERAL BLOOD DATA:     CLINICAL LAB RESULTS:   Battery Order No. Lab Test Code Clinical Result Ref. Range Units Result   Date   Hemogram/Diff/PLT F241 BH WBC Count 6.2 4.0-11.0 10e9/L 10/16/2020 10:06        RBC Count 4.63 4.4-5.9 10e12/L 10/16/2020 10:06        Hemoglobin 14.0 13.3-17.7 g/dL 10/16/2020 10:06        Hematocrit 43.1 40.0-53.0 % 10/16/2020 10:06        MCV 93  fl 10/16/2020 10:06        MCH 30.2 26.5-33.0 pg 10/16/2020 10:06        MCHC 32.5 31.5-36.5 g/dL 10/16/2020 10:06        RDW 12.4 10.0-15.0 % 10/16/2020 10:06        Platelet Count 270 150-450 10e9/L 10/16/2020 10:06         SEE TEXT   10/16/2020 10:06        Text/Comments:   Automated Method        % Neutrophils 56.0  % 10/16/2020 10:06        % Lymphocytes 35.7  % 10/16/2020 10:06        % Monocytes 5.8  % 10/16/2020 10:06        % Eosinophils 1.6  % 10/16/2020 10:06        % Basophils 0.6  % 10/16/2020 10:06        % Immature Grans 0.3  % 10/16/2020 10:06        Nucleated RBCs 0 0 /100 10/16/2020 10:06        abs Neutrophils 3.5 1.6-8.3 10e9/L 10/16/2020 10:06        abs Lymphocytes 2.2 0.8-5.3 10e9/L 10/16/2020 10:06        abs Monocytes 0.4 0.0-1.3 10e9/L 10/16/2020 10:06        abs Eosinophils 0.1 0.0-0.7 10e9/L 10/16/2020 10:06        abs Basophils 0.0 0.0-0.2 10e9/L 10/16/2020 10:06        abs  Imm Granulocytes 0.0 0-0.4 10e9/L 10/16/2020 10:06        abs NRBC 0.0   10/16/2020 10:06     Retic   Retic % 0.8 0.5-2.0 % 10/16/2020 10:06        Retic abs 37.0 25-95 10e9/L 10/16/2020 10:06     MICROSCOPIC DESCRIPTION:   PERIPHERAL BLOOD MORPHOLOGY:   The red blood cells appear normochromic. Poikilocytosis is minimal.   Polychromasia is not increased. Rouleaux   formation is not increased. The morphology of the platelets is normal,   with small platelet clumps identified.   Lymphocytes include reactive forms. Neutrophils display unremarkable   cytoplasmic granularity and nuclear   morphology. No circulating mast cells are seen on scanning.     Bone marrow aspirates and touch imprints of bone marrow biopsy are   reviewed.     BONE MARROW DIFFERENTIAL (500 cells on direct aspirate smear):   Percent Cell (reference range)   0% Blasts (0 - 1)   1% Neutrophil promyelocytes (2 - 4)   59% Neutrophils and precursors (54 - 63)   15% Erythroid precursors (18 - 24)   3% Monocytes (1 - 1.5)   3% Eosinophils (1 - 3)   0% Basophils (0 - 1)   17% Lymphocytes (8 - 12)   1% Plasma cells (0 - 1.5)   1% Mast cells     Neutrophil maturation is complete. Erythroid maturation is complete. Rare   terminal erythroid precursors show   atypical hemoglobinization. Megakaryocytes are present and show an overall    normal morphologic spectrum. Rare   mast cells are seen on the aspirate smears; mast cells are more prominent   on the particle crush.     Iron Stains:   Dacie iron stain on concentrate smears: iron stains show 9% sideroblasts.   A rare ring sideroblast is seen on   scanning. Prussian blue stain on crush preparation: marrow iron stores are    adequate.     Trephine Sections:   The trephine core biopsy is adequate. The marrow cellularity is estimated   at 50-60%. Occasional large   aggregates of cells with granular cytoplasm are present, consistent with   mast cells. Each aggregate contains   more than 15 cells, approximately half  of which show spindle morphology.   Outside of these aggregates, the   cellular composition reflects the aspirate differential. Megakaryocytes   are present with normal morphology and   distribution.     Immunohistochemistry:   Immunohistochemical stains are performed on the paraffin-embedded trephine    core sections with appropriately   reactive control tissue.      and mast cell tryptase highlight mast cells, which are scattered   throughout the marrow and can be seen   forming occasional large aggregates, each containing more than 15 cells.   The mast cells are weakly positive   for CD2. Together, the scattered and aggregated mast cells represent an   estimated 15% of marrow cellularity.     CD3 highlights increased scattered positive T cells, which also show   strong staining for CD2.     Note: These immunohistochemical stains are deemed medically necessary.   Some of the antigens may also be   evaluated by flow cytometry. Concurrent evaluation by immunohistochemistry    on clot and/or trephine sections is   indicated in this case in order to correlate immunophenotype with cell   morphology and determine extent of   involvement, spatial pattern, and focality of potential disease   distribution.     Resident or fellow review by: Lashell Ibrahim PGY1     A resident/fellow in an [employer]CGME accredited training program was involved in   the initial review, preparation,   and/or interpretation of this case. I, as the senior physician, attest   that I: (i) reviewed patient clinical   records if indicated; (ii) reviewed relevant lab test results; (iii)   examined the relevant preparation(s) for   the specimen(s); and (iv) agree with the report, diagnosis(es), and   interpretation as documented by the   resident/fellow and edited/confirmed by me.     CPT Codes:   A: 10967-VUDJR, 00033-ZFHW, HBM, 71417-SSUO, 00646-WKAIZ, 18158-BWXCS,   18037-PBEXE, 84026-WEVJZ, 32379-DRWGH,   84698-VFC, 57086-LHA, 00165-YGT,  71483-VNH, 04810-YPA, 08382-SYE <CR>,   88648-TWGAFF+     TESTING LAB LOCATION:   [facility], [address]   [address] [phone]     KIT Mutation analysis, bone marrow  Patient Name: ALEA GUTIERREZ   MR#: [id number]   Specimen #: [id number]   Collected: 10/16/2020 11:05   Received: 10/16/2020 13:12   Reported: 10/26/2020 14:57   Ordering Phy(s): HIEU LYON   Additional Phy(s): Copy to Special Hematology   MEDARDO SALAZAR     For improved result formatting, select 'View Enhanced Report Format' under    Linked Documents section.   _________________________________________     TEST(S) REQUESTED:   Next Generation Sequencing Oncology-AML     SPECIMEN DESCRIPTION:   Bone Marrow (Left)     SIGNIFICANT RESULTS     ---------------------------------------------------------------------   Detected Alterations of Known or Potential Pathogenicity: KIT D816V     Detected Alterations of Uncertain Significance: None     Genes with No Detected Clinically Significant Alterations: None   ---------------------------------------------------------------------     INTERPRETATION OF RESULTS     ---------------------------------------------------------------------   A pathogenic 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     Concurrent morphologic results find abnormal mast cells aggregates with   atypical spindled morphology and   strongly express CD25 (see [id number]). Flow cytometric analysis support   this finding, showing a small mast   cell population that partially express CD2 and CD25 (see [id number]).   Together, these findings support the   diagnosis of systemic mastocytosis.

## 2020-09-30 ENCOUNTER — PRE VISIT (OUTPATIENT)
Dept: ONCOLOGY | Facility: CLINIC | Age: 60
End: 2020-09-30

## 2020-09-30 ENCOUNTER — VIRTUAL VISIT (OUTPATIENT)
Dept: ONCOLOGY | Facility: CLINIC | Age: 60
End: 2020-09-30
Attending: DERMATOLOGY
Payer: COMMERCIAL

## 2020-09-30 VITALS — WEIGHT: 230 LBS | BODY MASS INDEX: 33.97 KG/M2

## 2020-09-30 DIAGNOSIS — D47.09 MASTOCYTOSIS: ICD-10-CM

## 2020-09-30 PROCEDURE — 99204 OFFICE O/P NEW MOD 45 MIN: CPT | Mod: 95 | Performed by: INTERNAL MEDICINE

## 2020-09-30 PROCEDURE — 40001009 ZZH VIDEO/TELEPHONE VISIT; NO CHARGE

## 2020-09-30 ASSESSMENT — PAIN SCALES - GENERAL: PAINLEVEL: NO PAIN (0)

## 2020-09-30 NOTE — LETTER
"    9/30/2020         RE: Ren Yung  [address]  [facility] [address]        Dear Colleague,    Thank you for referring your patient, Ren Yung, to the [facility]. Please see a copy of my visit note below.    Ren Yung is a 59 year old male who is being evaluated via a billable video visit.      The patient has been notified of following:     \"This video visit will be conducted via a call between you and your physician/provider. We have found that certain health care needs can be provided without the need for an in-person physical exam.  This service lets us provide the care you need with a video conversation.  If a prescription is necessary we can send it directly to your pharmacy.  If lab work is needed we can place an order for that and you can then stop by our lab to have the test done at a later time.    Video visits are billed at different rates depending on your insurance coverage.  Please reach out to your insurance provider with any questions.    If during the course of the call the physician/provider feels a video visit is not appropriate, you will not be charged for this service.\"    Patient has given verbal consent for Video visit? Yes  How would you like to obtain your AVS? MyChart  If you are dropped from the video visit, the video invite should be resent to: Text to cell phone: [phone]  Will anyone else be joining your video visit? No       PRESLEY Moore        Video-Visit Details    Type of service:  Video Visit    Video Start Time: 10:08 am  Video End Time: 10:30 am    Originating Location (pt. Location): Home    Distant Location (provider location):  [facility]     Platform used for Video Visit: [facility]      Hematology Clinic consult note    Reasons for Consult: -mastocytosis    History of Present Illness: Mr. Yung is a 59 year old man with a history of rash since about 2014 that is been slowly progressive.  He said it " started with some dots on his arms and some pruritus but eventually have spread everywhere.  When he scratches himself, there was a red welts and it burns.  He was seen by Dr. Juan Luis Prakash in dermatology, and a skin biopsy showed that it is mastocytosis. He is seen for evaluation for systemic mastocytosis. He has a h/o anaphylaxis to bee sting about 15 years ago that required an overnight hospitalization.  Since that time, he has an EpiPen available.  He is not allergic to anything else that he is aware of.  He has been on some prednisone in the past for the itching, he said it helped only a little bit, so he is not on any now.  Benadryl helped a little bit with the itching.  He does use some lotions which are helpful.    He denies any fevers, chills, sweats, anorexia.  No coughing or wheezing.  No chest pain or palpitations.  No heartburn symptoms.  No nausea, vomiting, or early satiety.  No diarrhea.    Past Medical History:  - Rash as above  - History of anaphylaxis to an insect venom  -History of diverticulitis  -Varicose veins, no DVT  -Obesity    Medications:   EpiPen  Ibuprofen PRN  Triamcinolone ointment twice daily PRN    Family History: Father- of cancer, lung and prostate, he was a smoker.  Mother coronary artery disease,  siblings-one brother with hypertension and DVT, sister healthy.  No family history of skin rashes or autoimmune disorders.    Social History: smoking-never, alcohol- 2 beers or wine on the weekends, few during the week for a total of about 6 drinks per week.  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    Review of systems:  As in HPI.  The rest of the > 10 point review of systems was negative.      Physical exam:    General appearance:  Patient is 59 year old man in no acute distress.     HEENT:  No pallor, icterus.   Lungs: Normal respirations, no cough or audible wheezes.     Skin: No obvious rash on face and neck, but he held up his arm for me which showed numerous  purplish dots which he says is typical for his rash.    The rest of a comprehensive physical examination is deferred due to public [facility] emergency video visit restrictions.    Labs:  Results for ALEA GUTIERREZ (MRN [id number]) as of 9/30/2020 16:46   Ref. Range 8/11/2020 12:28   Tryptase ug/L Latest Ref Range: <11.0 ug/L 114.0 (H)     Results for ALEA GUTIERREZ (MRN [id number]) as of 9/30/2020 16:46   Ref. Range 11/23/2019 12:42   Sodium Latest Ref Range: 133 - 144 mmol/L 138   Potassium Latest Ref Range: 3.4 - 5.3 mmol/L 4.3   Chloride Latest Ref Range: 94 - 109 mmol/L 106   Carbon Dioxide Latest Ref Range: 20 - 32 mmol/L 31   Urea Nitrogen Latest Ref Range: 7 - 30 mg/dL 18   Creatinine Latest Ref Range: 0.66 - 1.25 mg/dL 0.83   GFR Estimate Latest Ref Range: >60 mL/min/1.73_m2 >90   GFR Estimate If Black Latest Ref Range: >60 mL/min/1.73_m2 >90   Calcium Latest Ref Range: 8.5 - 10.1 mg/dL 8.6   Anion Gap Latest Ref Range: 3 - 14 mmol/L 1 (L)   Glucose Latest Ref Range: 70 - 99 mg/dL 90   WBC Latest Ref Range: 4.0 - 11.0 10e9/L 6.2   Hemoglobin Latest Ref Range: 13.3 - 17.7 g/dL 14.0   Hematocrit Latest Ref Range: 40.0 - 53.0 % 42.7   Platelet Count Latest Ref Range: 150 - 450 10e9/L 270   RBC Count Latest Ref Range: 4.4 - 5.9 10e12/L 4.63   MCV Latest Ref Range: 78 - 100 fl 92   MCH Latest Ref Range: 26.5 - 33.0 pg 30.2   MCHC Latest Ref Range: 31.5 - 36.5 g/dL 32.8   RDW Latest Ref Range: 10.0 - 15.0 % 12.4   Diff Method Unknown Automated Method   % Neutrophils Latest Units: % 47.6   % Lymphocytes Latest Units: % 43.7   % Monocytes Latest Units: % 6.2   % Eosinophils Latest Units: % 1.5   % Basophils Latest Units: % 0.5   % Immature Granulocytes Latest Units: % 0.5   Nucleated RBCs Latest Ref Range: 0 /100 0   Absolute Neutrophil Latest Ref Range: 1.6 - 8.3 10e9/L 2.9   Absolute Lymphocytes Latest Ref Range: 0.8 - 5.3 10e9/L 2.7   Absolute Monocytes Latest Ref Range: 0.0 - 1.3 10e9/L 0.4   Absolute  Eosinophils Latest Ref Range: 0.0 - 0.7 10e9/L 0.1   Absolute Basophils Latest Ref Range: 0.0 - 0.2 10e9/L 0.0   Abs Immature Granulocytes Latest Ref Range: 0 - 0.4 10e9/L 0.0   Absolute Nucleated RBC Unknown 0.0       Patient Name: ALEA GUTIERREZ   MR#: [id number]   Specimen #: [id number]   Collected: 8/11/2020   Received: 8/11/2020   Reported: 8/12/2020 20:47   Ordering Phy(s): LEO MADRIGAL     For improved result formatting, select 'View Enhanced Report Format' under    Linked Documents section.     SPECIMEN(S):   A: Skin, left arm, punch   B: Skin, left chest, punch     FINAL DIAGNOSIS:   A. Skin, left arm, punch:   - Features consistent with cutaneous mastocytosis - (see comment)     B. Skin, left chest, punch:   - Features consistent with cutaneous mastocytosis - (see comment)     COMMENT:   A-B:  highlights many papillary dermal perivascular mast cells,   supporting this diagnosis.     I have personally reviewed all specimens and/or slides, including the   listed special stains, and used them   with my medical judgement to determine or confirm the final diagnosis.     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  The question is whether he has systemic mastocytosis.  He does have a markedly elevated tryptase level.  He does not have any of the symptoms that one often thinks of with systemic mastocytosis-no heartburn or abdominal pain or wheezing or lightheadedness that would go along with a histamine release, other than the itching.  However it is important to determine whether he does have systemic involvement.  I suspect it is not one of the aggressive forms, since he reports having symptoms for many years.    The most important test is to do a bone marrow biopsy and aspirate looking for mast cells in the bone marrow. Also molecular  studies are very important, in particular KIT D816V mutation analysis.  This can be done on blood, but the bone marrow is more sensitive.  Bone marrow biopsy will also tell us that he has a more aggressive form.  If he has systemic mastocytosis he has the  [id number] mutation the midsternum would be the treatment, if it is not mutated then imatinib could be considered.  Hypereosinophilic syndrome should also be considered in the differential, although the skin biopsy goes against that.    Also patients with mastocytosis can have decreased bone density, so is important to check bone density scan and start treatment with vitamin D and calcium as indicated.    I discussed with him that the bone marrow is one of the key issues here and I will be able to tell him a lot more about what we do to try to treat him and manage his symptoms once we have the biopsy results.  We will try to arrange for that within the next couple weeks at the clinic and surgical center.  He also needs a CBC with differential, comprehensive metabolic panel, LD, retake, beta-2 microglobulin.  These Q drawn the same day as phono biopsy    I will be in touch with him regarding results and we can arrange follow-up from there.    Yadira Sharpe MD  Hematology

## 2020-10-02 ENCOUNTER — PATIENT OUTREACH (OUTPATIENT)
Dept: ONCOLOGY | Facility: CLINIC | Age: 60
End: 2020-10-02

## 2020-10-02 NOTE — PROGRESS NOTES
Verbal and written instruction provided re: BMBX using EPIC Reference: Bone Marrow Aspiration and Biopsy.     Discussed option for sedative IV with procedure: pt accepts.  Pt understands she/he will need a  if he opts for sedative.     Pt is not on any blood thinning medications.     ** If this patient has had low platelets recent , 50K, especially 10-30K, consider setting up for transfusion same day of procedure. We normally transfuse them if plts <20K. **

## 2020-10-02 NOTE — PROGRESS NOTES
Writer placed call to patient to introduce self and role as RN Care Coordinator for Dr. Sharpe . Writer reviewed methods of contact for care coordination needs and when to contact triage for new/conerning symptoms/illness.     See additional note for plan of caare and BMBx education.    Analia Staley, BSN-RN, PHN  RN Care Coordinator  [facility] [address] [address]  [email or web address]  Office: [phone] Option 5, Option 2  Fax: [phone]    Employed by [facility] Physician

## 2020-10-16 ENCOUNTER — OFFICE VISIT (OUTPATIENT)
Dept: ONCOLOGY | Facility: CLINIC | Age: 60
End: 2020-10-16
Attending: PHYSICIAN ASSISTANT
Payer: COMMERCIAL

## 2020-10-16 ENCOUNTER — ANCILLARY PROCEDURE (OUTPATIENT)
Dept: BONE DENSITY | Facility: CLINIC | Age: 60
End: 2020-10-16
Attending: INTERNAL MEDICINE
Payer: COMMERCIAL

## 2020-10-16 VITALS
RESPIRATION RATE: 16 BRPM | WEIGHT: 233 LBS | BODY MASS INDEX: 34.41 KG/M2 | DIASTOLIC BLOOD PRESSURE: 84 MMHG | HEART RATE: 58 BPM | TEMPERATURE: 98.1 F | SYSTOLIC BLOOD PRESSURE: 126 MMHG | OXYGEN SATURATION: 98 %

## 2020-10-16 DIAGNOSIS — D47.09 MASTOCYTOSIS: ICD-10-CM

## 2020-10-16 LAB
ALBUMIN SERPL-MCNC: 3.7 G/DL (ref 3.4–5)
ALP SERPL-CCNC: 84 U/L (ref 40–150)
ALT SERPL W P-5'-P-CCNC: 19 U/L (ref 0–70)
ANION GAP SERPL CALCULATED.3IONS-SCNC: 4 MMOL/L (ref 3–14)
AST SERPL W P-5'-P-CCNC: 11 U/L (ref 0–45)
B2 MICROGLOB SERPL-MCNC: 2.1 MG/L
BASOPHILS # BLD AUTO: 0 10E9/L (ref 0–0.2)
BASOPHILS NFR BLD AUTO: 0.6 %
BILIRUB SERPL-MCNC: 0.6 MG/DL (ref 0.2–1.3)
BUN SERPL-MCNC: 16 MG/DL (ref 7–30)
CALCIUM SERPL-MCNC: 8.6 MG/DL (ref 8.5–10.1)
CHLORIDE SERPL-SCNC: 108 MMOL/L (ref 94–109)
CO2 SERPL-SCNC: 28 MMOL/L (ref 20–32)
CREAT SERPL-MCNC: 0.77 MG/DL (ref 0.66–1.25)
DIFFERENTIAL METHOD BLD: NORMAL
EOSINOPHIL # BLD AUTO: 0.1 10E9/L (ref 0–0.7)
EOSINOPHIL NFR BLD AUTO: 1.6 %
ERYTHROCYTE [DISTWIDTH] IN BLOOD BY AUTOMATED COUNT: 12.4 % (ref 10–15)
GFR SERPL CREATININE-BSD FRML MDRD: >90 ML/MIN/{1.73_M2}
GLUCOSE SERPL-MCNC: 96 MG/DL (ref 70–99)
HCT VFR BLD AUTO: 43.1 % (ref 40–53)
HGB BLD-MCNC: 14 G/DL (ref 13.3–17.7)
IMM GRANULOCYTES # BLD: 0 10E9/L (ref 0–0.4)
IMM GRANULOCYTES NFR BLD: 0.3 %
LDH SERPL L TO P-CCNC: 112 U/L (ref 85–227)
LYMPHOCYTES # BLD AUTO: 2.2 10E9/L (ref 0.8–5.3)
LYMPHOCYTES NFR BLD AUTO: 35.7 %
MCH RBC QN AUTO: 30.2 PG (ref 26.5–33)
MCHC RBC AUTO-ENTMCNC: 32.5 G/DL (ref 31.5–36.5)
MCV RBC AUTO: 93 FL (ref 78–100)
MONOCYTES # BLD AUTO: 0.4 10E9/L (ref 0–1.3)
MONOCYTES NFR BLD AUTO: 5.8 %
NEUTROPHILS # BLD AUTO: 3.5 10E9/L (ref 1.6–8.3)
NEUTROPHILS NFR BLD AUTO: 56 %
NRBC # BLD AUTO: 0 10*3/UL
NRBC BLD AUTO-RTO: 0 /100
PLATELET # BLD AUTO: 270 10E9/L (ref 150–450)
POTASSIUM SERPL-SCNC: 4.2 MMOL/L (ref 3.4–5.3)
PROT SERPL-MCNC: 7.6 G/DL (ref 6.8–8.8)
RBC # BLD AUTO: 4.63 10E12/L (ref 4.4–5.9)
RETICS # AUTO: 37 10E9/L (ref 25–95)
RETICS/RBC NFR AUTO: 0.8 % (ref 0.5–2)
SODIUM SERPL-SCNC: 140 MMOL/L (ref 133–144)
WBC # BLD AUTO: 6.2 10E9/L (ref 4–11)

## 2020-10-16 PROCEDURE — 88161 CYTOPATH SMEAR OTHER SOURCE: CPT | Mod: TC | Performed by: PHYSICIAN ASSISTANT

## 2020-10-16 PROCEDURE — 88305 TISSUE EXAM BY PATHOLOGIST: CPT | Mod: 26 | Performed by: STUDENT IN AN ORGANIZED HEALTH CARE EDUCATION/TRAINING PROGRAM

## 2020-10-16 PROCEDURE — 88184 FLOWCYTOMETRY/ TC 1 MARKER: CPT | Mod: TC | Performed by: INTERNAL MEDICINE

## 2020-10-16 PROCEDURE — 88264 CHROMOSOME ANALYSIS 20-25: CPT | Mod: TC | Performed by: INTERNAL MEDICINE

## 2020-10-16 PROCEDURE — 88185 FLOWCYTOMETRY/TC ADD-ON: CPT | Mod: TC | Performed by: INTERNAL MEDICINE

## 2020-10-16 PROCEDURE — 38222 DX BONE MARROW BX & ASPIR: CPT | Performed by: PHYSICIAN ASSISTANT

## 2020-10-16 PROCEDURE — 99207 PR NO CHARGE LOS: CPT | Performed by: PHYSICIAN ASSISTANT

## 2020-10-16 PROCEDURE — 77080 DXA BONE DENSITY AXIAL: CPT

## 2020-10-16 PROCEDURE — 88313 SPECIAL STAINS GROUP 2: CPT | Mod: 26 | Performed by: STUDENT IN AN ORGANIZED HEALTH CARE EDUCATION/TRAINING PROGRAM

## 2020-10-16 PROCEDURE — 85097 BONE MARROW INTERPRETATION: CPT | Performed by: STUDENT IN AN ORGANIZED HEALTH CARE EDUCATION/TRAINING PROGRAM

## 2020-10-16 PROCEDURE — 81450 HL NEO GSAP 5-50DNA/DNA&RNA: CPT | Performed by: INTERNAL MEDICINE

## 2020-10-16 PROCEDURE — 88342 IMHCHEM/IMCYTCHM 1ST ANTB: CPT | Mod: 26 | Performed by: STUDENT IN AN ORGANIZED HEALTH CARE EDUCATION/TRAINING PROGRAM

## 2020-10-16 PROCEDURE — 88271 CYTOGENETICS DNA PROBE: CPT | Mod: TC | Performed by: INTERNAL MEDICINE

## 2020-10-16 PROCEDURE — 88313 SPECIAL STAINS GROUP 2: CPT | Mod: TC | Performed by: PHYSICIAN ASSISTANT

## 2020-10-16 PROCEDURE — 88291 CYTO/MOLECULAR REPORT: CPT | Performed by: MEDICAL GENETICS

## 2020-10-16 PROCEDURE — G0452 MOLECULAR PATHOLOGY INTERPR: HCPCS | Performed by: STUDENT IN AN ORGANIZED HEALTH CARE EDUCATION/TRAINING PROGRAM

## 2020-10-16 PROCEDURE — 88189 FLOWCYTOMETRY/READ 16 & >: CPT | Mod: 26 | Performed by: PATHOLOGY

## 2020-10-16 PROCEDURE — 80053 COMPREHEN METABOLIC PANEL: CPT | Performed by: INTERNAL MEDICINE

## 2020-10-16 PROCEDURE — 88237 TISSUE CULTURE BONE MARROW: CPT | Mod: TC | Performed by: INTERNAL MEDICINE

## 2020-10-16 PROCEDURE — 83615 LACTATE (LD) (LDH) ENZYME: CPT | Performed by: INTERNAL MEDICINE

## 2020-10-16 PROCEDURE — 88342 IMHCHEM/IMCYTCHM 1ST ANTB: CPT | Mod: TC | Performed by: PHYSICIAN ASSISTANT

## 2020-10-16 PROCEDURE — G0452 MOLECULAR PATHOLOGY INTERPR: HCPCS | Performed by: PATHOLOGY

## 2020-10-16 PROCEDURE — 250N000011 HC RX IP 250 OP 636: Performed by: NURSE PRACTITIONER

## 2020-10-16 PROCEDURE — 999N001086 HC STATISTIC MORPHOLOGY W/INTERP HEMEPATH TC 85060: Performed by: PHYSICIAN ASSISTANT

## 2020-10-16 PROCEDURE — 999N001137 HC STATISTIC FLOW >15 ABY TC 88189: Performed by: INTERNAL MEDICINE

## 2020-10-16 PROCEDURE — 88341 IMHCHEM/IMCYTCHM EA ADD ANTB: CPT | Mod: TC | Performed by: PHYSICIAN ASSISTANT

## 2020-10-16 PROCEDURE — 999N001126 HC STATISTIC BONE MARROW INTERP TC 85097: Performed by: PHYSICIAN ASSISTANT

## 2020-10-16 PROCEDURE — 38222 DX BONE MARROW BX & ASPIR: CPT

## 2020-10-16 PROCEDURE — 88341 IMHCHEM/IMCYTCHM EA ADD ANTB: CPT | Mod: 26 | Performed by: STUDENT IN AN ORGANIZED HEALTH CARE EDUCATION/TRAINING PROGRAM

## 2020-10-16 PROCEDURE — 88280 CHROMOSOME KARYOTYPE STUDY: CPT | Mod: TC | Performed by: INTERNAL MEDICINE

## 2020-10-16 PROCEDURE — 88311 DECALCIFY TISSUE: CPT | Mod: 26 | Performed by: STUDENT IN AN ORGANIZED HEALTH CARE EDUCATION/TRAINING PROGRAM

## 2020-10-16 PROCEDURE — 88311 DECALCIFY TISSUE: CPT | Mod: TC | Performed by: PHYSICIAN ASSISTANT

## 2020-10-16 PROCEDURE — 82232 ASSAY OF BETA-2 PROTEIN: CPT | Performed by: INTERNAL MEDICINE

## 2020-10-16 PROCEDURE — 85097 BONE MARROW INTERPRETATION: CPT | Mod: GC | Performed by: STUDENT IN AN ORGANIZED HEALTH CARE EDUCATION/TRAINING PROGRAM

## 2020-10-16 PROCEDURE — 88275 CYTOGENETICS 100-300: CPT | Mod: TC | Performed by: INTERNAL MEDICINE

## 2020-10-16 PROCEDURE — 85045 AUTOMATED RETICULOCYTE COUNT: CPT | Performed by: INTERNAL MEDICINE

## 2020-10-16 PROCEDURE — 85025 COMPLETE CBC W/AUTO DIFF WBC: CPT | Performed by: INTERNAL MEDICINE

## 2020-10-16 PROCEDURE — 999N001022 HC STATISTIC H-SPHEME PROCESS B/S: Performed by: PHYSICIAN ASSISTANT

## 2020-10-16 PROCEDURE — 96374 THER/PROPH/DIAG INJ IV PUSH: CPT | Mod: 59

## 2020-10-16 PROCEDURE — 88305 TISSUE EXAM BY PATHOLOGIST: CPT | Mod: TC | Performed by: PHYSICIAN ASSISTANT

## 2020-10-16 RX ADMIN — MIDAZOLAM HYDROCHLORIDE 2 MG: 2 INJECTION, SOLUTION INTRAMUSCULAR; INTRAVENOUS at 10:38

## 2020-10-16 ASSESSMENT — PAIN SCALES - GENERAL: PAINLEVEL: NO PAIN (0)

## 2020-10-16 NOTE — NURSING NOTE
"Oncology Rooming Note    October 16, 2020 9:59 AM   Ren Yung is a 59 year old male who presents for:    Chief Complaint   Patient presents with     Oncology Clinic Visit     BMBX- Mastocytosis     Initial Vitals: /76 (BP Location: Left arm, Patient Position: Sitting, Cuff Size: Adult Regular)   Pulse 65   Temp 98.1  F (36.7  C) (Oral)   Resp 16   Wt 105.7 kg (233 lb)   SpO2 98%   BMI 34.41 kg/m   Estimated body mass index is 34.41 kg/m  as calculated from the following:    Height as of 9/15/15: 1.753 m (5' 9\").    Weight as of this encounter: 105.7 kg (233 lb). Body surface area is 2.27 meters squared.  No Pain (0) Comment: Data Unavailable   No LMP for male patient.  Allergies reviewed: Yes  Medications reviewed: Yes    Medications: Medication refills not needed today.  Pharmacy name entered into [employer]: [id number] - [address] AT [address]    Clinical concerns: N/A        Kiana La CMA              "

## 2020-10-16 NOTE — PROGRESS NOTES
BMT ONC Adult Bone Marrow Biopsy Procedure Note  October 16, 2020  There were no vitals taken for this visit.     Learning needs assessment complete within 12 months?yes   DIAGNOSIS: mastocytosis    PROCEDURE: Unilateral Bone Marrow Biopsy and Unilateral Aspirate    LOCATION: [facility] 2nd Floor    Patient s identification was positively verified by verbal identification and invasive procedure safety checklist was completed. Informed consent was obtained. Following the administration of Midazolam 2 mg as pre-medication, patient was placed in the left lateral decubitus position and prepped and draped in a sterile manner. Approximately 15 cc of 1% Lidocaine was used over the left posterior iliac spine. Following this a 3 mm incision was made. Trephine bone marrow core(s) was (were) obtained from the [facility]. Bone marrow aspirates were obtained from the IC. Aspirates were sent for morphology, immunophenotyping, cytogenetics and molecular diagnostics. A total of approximately 17 ml of marrow was aspirated. Following this procedure a sterile dressing was applied to the bone marrow biopsy site(s). The patient was placed in the supine position to maintain pressure on the biopsy site. Post-procedure wound care instructions were given.     Complications: NO    Pre-procedural pain: 0 out of 10 on the numeric pain rating scale.     Procedural pain: 0 out of 10 on the numeric pain rating scale.     Post-procedural pain assessment: 0 out of 10 on the numeric pain rating scale.     Interventions: NO    Length of procedure:21 minutes to 45 minutes    Procedure performed by: LARA Soares, JOSELYN and Latrice Briscoe PA-C

## 2020-10-16 NOTE — NURSING NOTE
Provider order received to administer Versed 2mg IVP as premed for BMBX. Procedural consent discussed and pt's signature obtained.  Allergies reviewed.  PT currently alert and oriented to plan of care.  Pt lying prone in stretcher.  Call light w/in reach.  Provider and  at bedside.      BMT Teaching Flowsheet   Teaching Topic: post biopsy instructions    Person(s) involved in teaching: Patient  Motivation Level  Asks Questions: Yes  Eager to Learn: Yes  Cooperative: Yes  Receptive (willing/able to accept information): Yes    Patient demonstrates understanding of the following:   - Reason for the appointment, diagnosis and treatment plan: Yes  - Knowledge of proper use of medications and conditions for which they are ordered (with special attention to potential side effects or drug interactions): Yes  - Which situations necessitate calling provider and whom to contact: Yes    Teaching concerns addressed: reviewed activity restrictions if received premeds, potential for bleeding and actions to take if develops any of the issues below    Proper use and care of (medical equipment, care aids, etc.) Yes  Pain management techniques: Yes  Patient instructed on hand hygiene: Yes  How and/when to access community resources: Yes    Infection Control:  Patient demonstrates understanding of the following:   Surgical procedure site care taught NA  Signs and symptoms of infection taught Yes  Wound care taught Yes  Central venous catheter care taught NA    Instructional Materials Used/Given: verbal, print out of post biopsy instructions.     Time spent with patient: 0 minutes.    Specific Concerns: NA

## 2020-10-16 NOTE — NURSING NOTE
Patient supine for 30 minutes following biopsy. After 30 minutes, dressing clean, dry and intact. Vital signs stable. See DOC flow sheets for details. Left ambulatory with family member.    STEPHANIE RODRIGUEZ RN

## 2020-10-19 LAB — COPATH REPORT: NORMAL

## 2020-10-20 DIAGNOSIS — D47.09 MASTOCYTOSIS: Primary | ICD-10-CM

## 2020-10-20 LAB — COPATH REPORT: NORMAL

## 2020-10-20 PROCEDURE — 81272 KIT GENE TARGETED SEQ ANALYS: CPT | Performed by: DERMATOLOGY

## 2020-10-20 PROCEDURE — G0452 MOLECULAR PATHOLOGY INTERPR: HCPCS | Performed by: PATHOLOGY

## 2020-10-21 LAB — COPATH REPORT: NORMAL

## 2020-10-26 LAB — COPATH REPORT: NORMAL

## 2020-10-27 LAB
COPATH REPORT: NORMAL
COPATH REPORT: NORMAL

## 2020-11-01 ENCOUNTER — VIRTUAL VISIT (OUTPATIENT)
Dept: FAMILY MEDICINE | Facility: OTHER | Age: 60
End: 2020-11-01
Payer: COMMERCIAL

## 2020-11-01 PROCEDURE — 99421 OL DIG E/M SVC 5-10 MIN: CPT | Performed by: FAMILY MEDICINE

## 2020-11-01 NOTE — PROGRESS NOTES
"Date: 2020 15:13:30  Clinician: Robyn Wilkinson  Clinician NPI: [id number]  Patient: Ren Yung  Patient : 1960  Patient Address: [address] [place] [address]  Patient Phone: [phone]  Visit Protocol: URI  Patient Summary:  Ren is a 59 year old ( : 1960 ) male who initiated a [facility] Visit for COVID-19 (Coronavirus) evaluation and screening. When asked the question \"Please sign me up to receive news, health information and promotions from [facility].\", Ren responded \"No\".    Ren states his symptoms started gradually 5-6 days ago.   His symptoms consist of a headache, a cough, nasal congestion, nausea, facial pain or pressure, myalgia, malaise, ageusia, and anosmia. He is experiencing difficulty breathing due to nasal congestion but he is not short of breath. Ren also feels feverish.   Symptom details     Nasal secretions: The color of his mucus is clear.    Cough: Ren coughs a few times an hour and his cough is not more bothersome at night. Phlegm does not come into his throat when he coughs. He does not believe his cough is caused by post-nasal drip.     Temperature: His current temperature is 100.6 degrees Fahrenheit. Ren has had a temperature over 100 degrees Fahrenheit for 1-2 days.     Facial pain or pressure: The facial pain or pressure feels worse when bending over or leaning forward.     Headache: He states the headache is moderate (4-6 on a 10 point pain scale).      Ren denies having ear pain, wheezing, chills, sore throat, teeth pain, diarrhea, vomiting, and rhinitis. He also denies having recent facial or sinus surgery in the past 60 days, double sickening (worsening symptoms after initial improvement), having a sinus infection within the past year, and taking antibiotic medication in the past month.   Precipitating events  He has not recently been exposed to someone with influenza. Ren has been in close contact with the following high risk " individuals: people with asthma, heart disease or diabetes.   Pertinent COVID-19 (Coronavirus) information  Ren does not work or volunteer as healthcare worker or a . In the past 14 days, Ren has not worked or volunteered at a healthcare facility or group living setting.   In the past 14 days, he also has not lived in a congregate living setting.   Ren has not had a close contact with a laboratory-confirmed COVID-19 patient within 14 days of symptom onset.    Since December 2019, Ren has not been diagnosed with lab-confirmed COVID-19 test and has not had upper respiratory infection or influenza-like illness.   Pertinent medical history  Ren does not need a return to work/school note.   Weight: 230 lbs   Ren does not smoke or use smokeless tobacco.   Additional information as reported by the patient (free text): Mastocytosis   Weight: 230 lbs    MEDICATIONS: No current medications, ALLERGIES: NKDA  Clinician Response:  Dear Ren,   Your symptoms show that you may have coronavirus (COVID-19). This illness can cause fever, cough and trouble breathing. Many people get a mild case and get better on their own. Some people can get very sick.  What should I do?  We would like to test you for this virus.   1. Please call [phone] to schedule your visit. Explain that you were [facility] have a COVID-19 test. Be ready to share your [facility] visit ID number.  The following will serve as your written order for this COVID Test, ordered by me, for the indication of suspected COVID [Z20.828]: The test will be ordered in [employer], our electronic health record, after you are scheduled. It will show as ordered and authorized by Ancelmo Mattson MD.  Order: COVID-19 (Coronavirus) PCR for SYMPTOMATIC testing from [facility].   2. When it's time for your COVID test:  Stay at least 6 feet away from others. (If someone will drive you to your test, stay in the backseat, as far away from the  as you can.)    "Cover your mouth and nose with a mask, tissue or washcloth.  Go straight to the testing site. Don't make any stops on the way there or back.      3.Starting now: Stay home and away from others (self-isolate) until:   You've had no fever---and no medicine that reduces fever---for one full day (24 hours). And...   Your other symptoms have gotten better. For example, your cough or breathing has improved. And...   At least 10 days have passed since your symptoms started.       During this time, don't leave the house except for testing or medical care.   Stay in your own room, even for meals. Use your own bathroom if you can.   Stay away from others in your home. No hugging, kissing or shaking hands. No visitors.  Don't go to work, school or anywhere else.    Clean \"high touch\" surfaces often (doorknobs, counters, handles, etc.). Use a household cleaning spray or wipes. You'll find a full list of  on the EPA website: www.epa.gov/pesticide-registration/list-n-disinfectants-use-against-sars-cov-2.   Cover your mouth and nose with a mask, tissue or washcloth to avoid spreading germs.  Wash your hands and face often. Use soap and water.  Caregivers in these groups are at risk for severe illness due to COVID-19:  o People 65 years and older  o People who live in a nursing home or long-term care facility  o People with chronic disease (lung, heart, cancer, diabetes, kidney, liver, immunologic)  o People who have a weakened immune system, including those who:   Are in cancer treatment  Take medicine that weakens the immune system, such as corticosteroids  Had a bone marrow or organ transplant  Have an immune deficiency  Have poorly controlled HIV or AIDS  Are obese (body mass index of 40 or higher)  Smoke regularly   o Caregivers should wear gloves while washing dishes, handling laundry and cleaning bedrooms and bathrooms.  o Use caution when washing and drying laundry: Don't shake dirty laundry, and use the warmest " water setting that you can.  o For more tips, go to www.cdc.gov/coronavirus/2019-ncov/downloads/10Things.pdf.    4.Sign up for [employer]. We know it's scary to hear that you might have COVID-19. We want to track your symptoms to make sure you're okay over the next 2 weeks. Please look for an email from [employer]---this is a free, online program that we'll use to keep in touch. To sign up, follow the link in the email. Learn more at [email or web address]  How can I take care of myself?   Get lots of rest. Drink extra fluids (unless a doctor has told you not to).   Take Tylenol (acetaminophen) for fever or pain. If you have liver or kidney problems, ask your family doctor if it's okay to take Tylenol.   Adults can take either:    650 mg (two 325 mg pills) every 4 to 6 hours, or...   1,000 mg (two 500 mg pills) every 8 hours as needed.    Note: Don't take more than 3,000 mg in one day. Acetaminophen is found in many medicines (both prescribed and over-the-counter medicines). Read all labels to be sure you don't take too much.   For children, check the Tylenol bottle for the right dose. The dose is based on the child's age or weight.    If you have other health problems (like cancer, heart failure, an organ transplant or severe kidney disease): Call your specialty clinic if you don't feel better in the next 2 days.       Know when to call 911. Emergency warning signs include:    Trouble breathing or shortness of breath Pain or pressure in the chest that doesn't go away Feeling confused like you haven't felt before, or not being able to wake up Bluish-colored lips or face.  Where can I get more information?    [employer] [place] -- About COVID-19: [email or web address]   CDC -- What to Do If You're Sick: www.cdc.gov/coronavirus/2019-ncov/about/steps-when-sick.html   CDC -- Ending Home Isolation: www.cdc.gov/coronavirus/2019-ncov/hcp/disposition-in-home-patients.html   [facility] -- Caring for Someone:  www.cdc.gov/coronavirus/2019-ncov/if-you-are-sick/care-for-someone.html   [facility] -- Interim Guidance for Hospital Discharge to Home: www.health.[facility].[place]/diseases/coronavirus/hcp/hospdischarge.pdf   [facility] clinical trials (COVID-19 research studies): clinicalaffairs.[facility]-clinical-trials    Below are the COVID-19 hotlines at the [place] Department of Health ([facility]). Interpreters are available.    For health questions: Call 612-935-6792 or 1-478.296.7552 (7 a.m. to 7 p.m.) For questions about schools and childcare: Call 085-815-5338 or 1-197.432.8029 (7 a.m. to 7 p.m.)    Diagnosis: Cough  Diagnosis ICD: R05

## 2020-11-02 DIAGNOSIS — D47.02 INDOLENT SYSTEMIC MASTOCYTOSIS: Primary | ICD-10-CM

## 2020-11-02 RX ORDER — FAMOTIDINE 20 MG/1
20 TABLET, FILM COATED ORAL 2 TIMES DAILY
Qty: 60 TABLET | Refills: 11 | Status: SHIPPED | OUTPATIENT
Start: 2020-11-02

## 2020-11-02 RX ORDER — CETIRIZINE HYDROCHLORIDE 10 MG/1
10 TABLET ORAL 2 TIMES DAILY
Qty: 60 TABLET | Refills: 11 | Status: SHIPPED | OUTPATIENT
Start: 2020-11-02

## 2020-11-05 LAB — COPATH REPORT: NORMAL

## 2020-11-22 ENCOUNTER — HEALTH MAINTENANCE LETTER (OUTPATIENT)
Age: 60
End: 2020-11-22

## 2021-03-30 ENCOUNTER — IMMUNIZATION (OUTPATIENT)
Dept: FAMILY MEDICINE | Facility: CLINIC | Age: 61
End: 2021-03-30
Payer: COMMERCIAL

## 2021-03-30 PROCEDURE — 0011A PR COVID VAC MODERNA 100 MCG/0.5 ML IM: CPT

## 2021-03-30 PROCEDURE — 91301 PR COVID VAC MODERNA 100 MCG/0.5 ML IM: CPT

## 2021-04-27 ENCOUNTER — IMMUNIZATION (OUTPATIENT)
Dept: FAMILY MEDICINE | Facility: CLINIC | Age: 61
End: 2021-04-27
Attending: FAMILY MEDICINE
Payer: COMMERCIAL

## 2021-04-27 PROCEDURE — 91301 PR COVID VAC MODERNA 100 MCG/0.5 ML IM: CPT

## 2021-04-27 PROCEDURE — 0012A PR COVID VAC MODERNA 100 MCG/0.5 ML IM: CPT

## 2021-09-18 ENCOUNTER — HEALTH MAINTENANCE LETTER (OUTPATIENT)
Age: 61
End: 2021-09-18

## 2021-11-12 DIAGNOSIS — D47.09 MASTOCYTOSIS: Primary | ICD-10-CM

## 2021-11-22 ENCOUNTER — TELEPHONE (OUTPATIENT)
Dept: ONCOLOGY | Facility: CLINIC | Age: 61
End: 2021-11-22
Payer: COMMERCIAL

## 2021-11-22 NOTE — TELEPHONE ENCOUNTER
"Lab orders faxed per request from [facility], see below:  \"Pt is coming to see Dr. Sharpe Feb 2022 and would like to do labs at local [facility], fax [phone]. Please fax all labs by Dr. Sharpe to be done prior to apt.\"    Jacinta Davis CMA ([facility])          "

## 2022-01-08 ENCOUNTER — HEALTH MAINTENANCE LETTER (OUTPATIENT)
Age: 62
End: 2022-01-08

## 2022-01-10 ENCOUNTER — MYC MEDICAL ADVICE (OUTPATIENT)
Dept: ONCOLOGY | Facility: CLINIC | Age: 62
End: 2022-01-10
Payer: COMMERCIAL

## 2022-02-23 ENCOUNTER — VIRTUAL VISIT (OUTPATIENT)
Dept: ONCOLOGY | Facility: CLINIC | Age: 62
End: 2022-02-23
Attending: INTERNAL MEDICINE
Payer: COMMERCIAL

## 2022-02-23 DIAGNOSIS — D47.02 INDOLENT SYSTEMIC MASTOCYTOSIS: Primary | ICD-10-CM

## 2022-02-23 PROCEDURE — 99215 OFFICE O/P EST HI 40 MIN: CPT | Mod: 95 | Performed by: INTERNAL MEDICINE

## 2022-02-23 RX ORDER — CROMOLYN SODIUM 100 MG/5ML
200 SOLUTION, CONCENTRATE ORAL
Qty: 1200 ML | Refills: 1 | Status: SHIPPED | OUTPATIENT
Start: 2022-02-23 | End: 2022-04-20

## 2022-02-23 NOTE — PROGRESS NOTES
Ren is a 61 year old who is being evaluated via a billable video visit.      How would you like to obtain your AVS? [employer]  If the video visit is dropped, the invitation should be resent by: Text to cell phone: [phone]  Will anyone else be joining your video visit? [employer]  Video Start Time: 10:17 am  Video-Visit Details    Type of service:  Video Visit    Video End Time:10:35 am    Originating Location (pt. 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    The trephine core biopsy is adequate. The marrow cellularity is estimated at 50-60%. Occasional large   aggregates of cells with granular cytoplasm are present, consistent with mast cells. Each aggregate contains   more than 15 cells, approximately half of which show spindle morphology.  and mast cell tryptase highlight mast cells, which are scattered   throughout the marrow and can be seen   forming occasional large aggregates, each containing more than 15 cells. The mast cells are weakly positive   for CD2. Together, the scattered and aggregated mast cells represent an estimated 15% of marrow cellularity.    -History of low back pain  -Status post torn meniscus of knee  -Bee sting allergy  -Varicose veins, no history of deep vein thrombosis  -Obesity, BMI 35     History of Present Illness: Mr. Yung is a 59 year old man with a history systemic mastocytosis, seen by video for routine follow-up.  I first saw him on 9/30/2020.  He had a bone marrow biopsy and aspirate on 10/16/2020 that showed multifocal dense nodules of mastocytes.  He was started on histamine blockers-cetirizine 10 mg twice daily and famotidine 20 mg twice daily.  He is not sure if this  "is making any difference in his symptoms.  He says he has \"flareups\" daily which includes his skin turning bright red on his lower back and legs and if he itches them he gets big welts where he scratched and it makes the itching worse.  He has not had any coughing or wheezing.  No abdominal pain, cramping, diarrhea.  He maybe has some early satiety, not sure.    He also says when he sits for a long time he has some pain in his left buttocks that radiates down the back of his leg.  He does have a history of low back pain noted in the chart.  He also says his hands and elbows hurt.  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  He has occasional migraine.    He has been trying to eat low histamine foods.  [facility] which is diamine oxidase.  He only takes this once every 3 to 4 weeks.    He is also taking vitamin D.    Physical exam:    General appearance:  Patient is 61-year-old man in no acute distress.     HEENT:  No pallor, icterus.  Skin looks a bit flushed.  Lungs: Normal respirations, no cough or audible wheezes.:      Skin: Skin a bit flushed on face and arms.  No petechiae or ecchymoses.    The rest of a comprehensive physical examination is deferred due to public [facility] emergency video visit restrictions.      Labs:  [facility] 2/15/2022  WBC 6.3 3.5 - 10.5 x10(9)/L   02/15/2022 8:57 AM [facility] LAB     RBC 4.49 4.32 - 5.72 x10(12)/L   02/15/2022 8:57 AM [facility] LAB     Hemoglobin 13.4 (L) 13.5 - 17.5 g/dL   02/15/2022 8:57 AM [facility] LAB     HCT 42.8 38.8 - 50.0 %   02/15/2022 8:57 AM [facility] LAB     MCV 95.3 80.0 - 100.0 fL   02/15/2022 8:57 AM [facility] LAB     MCH 29.8 27.6 - 33.3 pg   02/15/2022 8:57 AM CST [facility] LAB     MCHC 31.3 (L) 31.5 - 35.2 g/dL   02/15/2022 8:57 AM CST " [facility] LAB     RDW 12.8 11.9 - 15.5 %   02/15/2022 8:57 AM [facility] LAB     Platelets 252 150 - 450 x10(9)/L   02/15/2022 8:57 AM [facility] LAB     Immature Gran % 0.2 0.0 - 0.5 %   02/15/2022 8:57 AM [facility] LAB     Neutrophil Absolute 2.6 1.7 - 7.0 10(9)/L   02/15/2022 8:57 AM [facility] LAB     Lymphocyte Absolute 3.0 1.0 - 4.8 10(9)/L   02/15/2022 8:57 AM [facility] LAB     Monocytes Absolute 0.5 0.2 - 0.9 10(9)/L   02/15/2022 8:57 AM [facility] LAB     Eosinophil Absolute 0.2 0.0 - 0.5 10(9)/L   02/15/2022 8:57 AM [facility] LAB     Basophil Absolute 0.0 0.0 - 0.3 10(9)/L   02/15/2022 8:57 AM [facility] LAB       Ref Range & Units 8 d ago 2/15/2022 Comments   Tryptase Level <=10.9 ug/L 142 High     Comprehensive metabolic panel completely normal.        Assessment and recommendation:  Indolent systemic mastocytosis-he is having some fairly significant symptoms of skin flushing, but at this point is not having many other symptoms to go along with more aggressive systemic mastocytosis.  I think that the buttocks and leg pain is more likely to related to herniated disc than systemic mastocytosis.  He is not having much for GI or pulmonary symptoms.  His CBC is essentially normal, with only a slightly low hemoglobin.  It is hard to know if the hand and elbow achiness is at all related to the systemic mastocytosis versus osteoarthritis.    It would be useful to know if he has any hepatosplenomegaly.  I will arrange for him to undergo complete abdominal ultrasound with Doppler.  If he has hepatosplenomegaly, that would perhaps push me more toward considering treatment with no distortion, but at this time, there is no strong indication for chemotherapy.    I will also add cromolyn 200 mg p.o. 4 times a day to see if this helps his symptoms.  He should continue to take the cetirizine, famotidine,  vitamin D.    I will have him follow-up in about 6 months and repeat a DEXA scan at that time.    Time: I spent a total of 50 minutes on the day of the visit. Please see the note for further information on patient assessment and treatment.     Yadira Sharpe MD  Hematology

## 2022-03-23 ENCOUNTER — ANCILLARY PROCEDURE (OUTPATIENT)
Dept: ULTRASOUND IMAGING | Facility: CLINIC | Age: 62
End: 2022-03-23
Attending: INTERNAL MEDICINE
Payer: COMMERCIAL

## 2022-03-23 DIAGNOSIS — D47.02 INDOLENT SYSTEMIC MASTOCYTOSIS: ICD-10-CM

## 2022-03-23 PROCEDURE — 93975 VASCULAR STUDY: CPT | Mod: GC | Performed by: RADIOLOGY

## 2022-04-18 DIAGNOSIS — D47.02 INDOLENT SYSTEMIC MASTOCYTOSIS: ICD-10-CM

## 2022-04-19 NOTE — TELEPHONE ENCOUNTER
Medication:Cromolyn  Last prescribing provider:Dr. Sauer    Last clinic visit date: 2/23/22 Dr. Sauer  Recommendations for requested medication:     Any missed appointments or no-shows since last clinic visit?: none  Next clinic visit date: 8/24/22 Dr. Sauer    Any other pertinent information:  Routed to dr. sauer

## 2022-04-20 RX ORDER — CROMOLYN SODIUM 100 MG/5ML
SOLUTION, CONCENTRATE ORAL
Qty: 1200 ML | Refills: 3 | Status: SHIPPED | OUTPATIENT
Start: 2022-04-20 | End: 2022-10-18

## 2022-05-13 ENCOUNTER — TELEPHONE (OUTPATIENT)
Dept: DERMATOLOGY | Facility: CLINIC | Age: 62
End: 2022-05-13
Payer: COMMERCIAL

## 2022-05-13 NOTE — TELEPHONE ENCOUNTER
[facility] Call Center    Phone Message    May a detailed message be left on voicemail: yes     Reason for Call: Other: The pt is asking about the home light trmt he has. He says he has 8 more treatments left, and wants to know what to do when the prescription runs out. Please call to discuss. Thanks.     Action Taken: Message routed to:  [facility] derm    Travel Screening: Not Applicable

## 2022-05-16 NOTE — TELEPHONE ENCOUNTER
Called and left a voicemail for Ren noting that he needs to be seen as it has been almost 2 years since he was last seen. Clinic number provided to call back

## 2022-05-16 NOTE — TELEPHONE ENCOUNTER
Scheduled patient with Dr. Prakash 6/7 and he will need the code to get refills after his visit.    Rosita Zavala, EMT

## 2022-06-07 ENCOUNTER — VIRTUAL VISIT (OUTPATIENT)
Dept: DERMATOLOGY | Facility: CLINIC | Age: 62
End: 2022-06-07
Payer: COMMERCIAL

## 2022-06-07 DIAGNOSIS — D47.09 MASTOCYTOSIS: Primary | ICD-10-CM

## 2022-06-07 PROCEDURE — 99214 OFFICE O/P EST MOD 30 MIN: CPT | Mod: GQ | Performed by: DERMATOLOGY

## 2022-06-07 ASSESSMENT — PAIN SCALES - GENERAL: PAINLEVEL: NO PAIN (0)

## 2022-06-07 NOTE — LETTER
6/7/2022       RE: Ren Yung  [address]  [facility] [address]     Dear Colleague,    Thank you for referring your patient, Ren Yung, to the [facility] [place] at [facility]. Please see a copy of my visit note below.    [facility] Dermatology Note  Encounter Date: Jun 7, 2022  Telephone ([phone]). Location of teledermatologist: [facility] [place]. Start time: 9:30. End time: 9:42.    Dermatology Problem List:  1. Skin mastocytosis.   - s/p biopsy 8/11/2020  - prior tx: prednisone taper   2. Inflamed KP vs folliculitis, last evaluated by Dr. Rogers on 9/15/2015.    - prior tx: urea cream, diflucan     ____________________________________________    Assessment & Plan:     # Indolent mastocytosis with extensive persistent skin involvement.  - Will continue cromolyn, cetirizine bid and famotidine per his oncologist Dr. Sharpe  - Recommended adding in Allegra 180mg bid - if noticing sedation may need to decrease  - Discussed starting UVA1 phototherapy but transit time is an issue for patient (he lives in [place]) - will revisit this possibility at followup in 2 months  - Resume home nbUVB 3x per week    Juan Luis Prakash MD  Dermatology Attending    ____________________________________________    CC: Derm Problem (Light treatment refill)    HPI:  Mr. Ren Yung is a(n) 61 year old male who presents today for followup on indolent mastocytosis with diffuse skin involvement.    Last seen in dermatology clinic in August of 2020, at which time biopsy confirmed diagnosis.  After that visit we ordered home UVB unit and patient was using this but has stopped in past few months.  Currently has active lesions in many areas, ongoing itching.  Taking cromolyn, cetirizine and famotidine per his oncologist Dr. Sharpe; he follows with onc as he has had elevated tryptase  levels.    Patient is otherwise feeling well, without additional skin concerns.    Labs Reviewed:  Tryptase from 2/15/22 = 142    Physical Exam:  Vitals: There were no vitals taken for this visit.  SKIN: Teledermatology photos were reviewed; image quality and interpretability: acceptable. Image date: 6/7/22.  - Diffusely distributed, coalescing 2-3mm red-brownish macules on legs, arms and trunk  - No other lesions of concern on areas examined.     Medications:  Current Outpatient Medications   Medication     cetirizine (ZYRTEC) 10 MG tablet     cromolyn (GASTROCROM) 100 MG/5ML (HIGH CONC) solution     EPINEPHrine (ANY BX GENERIC EQUIV) 0.3 MG/0.3ML injection 2-pack     famotidine (PEPCID) 20 MG tablet     Acetaminophen (TYLENOL PO)     ammonium lactate (LAC-HYDRIN) 12 % cream     doxycycline (VIBRAMYCIN) 100 MG capsule     fluconazole (DIFLUCAN) 150 MG tablet     fluconazole (DIFLUCAN) 150 MG tablet     HYDROcodone-acetaminophen (NORCO) 5-325 MG tablet     IBUPROFEN PO     methylPREDNISolone (MEDROL DOSEPAK) 4 MG tablet therapy pack     Sildenafil Citrate (VIAGRA PO)     tamsulosin (FLOMAX) 0.4 MG 24 hr capsule     triamcinolone (KENALOG) 0.1 % cream     triamcinolone (KENALOG) 0.1 % external ointment     Urea (CARMOL 20) 20 % CREA     No current facility-administered medications for this visit.      Past Medical/Surgical History:   Patient Active Problem List   Diagnosis     Rash     Indolent systemic mastocytosis     No past medical history on file.    CC No referring provider defined for this encounter. on close of this encounter.

## 2022-06-07 NOTE — PROGRESS NOTES
[facility] Dermatology Note  Encounter Date: Jun 7, 2022  Telephone ([phone]). Location of teledermatologist: [facility] [place]. Start time: 9:30. End time: 9:42.    Dermatology Problem List:  1. Skin mastocytosis.   - s/p biopsy 8/11/2020  - prior tx: prednisone taper   2. Inflamed KP vs folliculitis, last evaluated by Dr. Rogers on 9/15/2015.    - prior tx: urea cream, diflucan     ____________________________________________    Assessment & Plan:     # Indolent mastocytosis with extensive persistent skin involvement.  - Will continue cromolyn, cetirizine bid and famotidine per his oncologist Dr. Sharpe  - Recommended adding in Allegra 180mg bid - if noticing sedation may need to decrease  - Discussed starting UVA1 phototherapy but transit time is an issue for patient (he lives in [place]) - will revisit this possibility at followup in 2 months  - Resume home nbUVB 3x per week    Juan Luis Prakash MD  Dermatology Attending    ____________________________________________    CC: Derm Problem (Light treatment refill)    HPI:  Mr. Ren Yung is a(n) 61 year old male who presents today for followup on indolent mastocytosis with diffuse skin involvement.    Last seen in dermatology clinic in August of 2020, at which time biopsy confirmed diagnosis.  After that visit we ordered home UVB unit and patient was using this but has stopped in past few months.  Currently has active lesions in many areas, ongoing itching.  Taking cromolyn, cetirizine and famotidine per his oncologist Dr. Sharpe; he follows with onc as he has had elevated tryptase levels.    Patient is otherwise feeling well, without additional skin concerns.    Labs Reviewed:  Tryptase from 2/15/22 = 142    Physical Exam:  Vitals: There were no vitals taken for this visit.  SKIN: Teledermatology photos were reviewed; image quality and interpretability: acceptable. Image date: 6/7/22.  - Diffusely  distributed, coalescing 2-3mm red-brownish macules on legs, arms and trunk  - No other lesions of concern on areas examined.     Medications:  Current Outpatient Medications   Medication     cetirizine (ZYRTEC) 10 MG tablet     cromolyn (GASTROCROM) 100 MG/5ML (HIGH CONC) solution     EPINEPHrine (ANY BX GENERIC EQUIV) 0.3 MG/0.3ML injection 2-pack     famotidine (PEPCID) 20 MG tablet     Acetaminophen (TYLENOL PO)     ammonium lactate (LAC-HYDRIN) 12 % cream     doxycycline (VIBRAMYCIN) 100 MG capsule     fluconazole (DIFLUCAN) 150 MG tablet     fluconazole (DIFLUCAN) 150 MG tablet     HYDROcodone-acetaminophen (NORCO) 5-325 MG tablet     IBUPROFEN PO     methylPREDNISolone (MEDROL DOSEPAK) 4 MG tablet therapy pack     Sildenafil Citrate (VIAGRA PO)     tamsulosin (FLOMAX) 0.4 MG 24 hr capsule     triamcinolone (KENALOG) 0.1 % cream     triamcinolone (KENALOG) 0.1 % external ointment     Urea (CARMOL 20) 20 % CREA     No current facility-administered medications for this visit.      Past Medical/Surgical History:   Patient Active Problem List   Diagnosis     Rash     Indolent systemic mastocytosis     No past medical history on file.    CC No referring provider defined for this encounter. on close of this encounter.

## 2022-07-03 PROBLEM — D47.09 MASTOCYTOSIS: Status: ACTIVE | Noted: 2022-07-03

## 2022-08-23 NOTE — PROGRESS NOTES
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    The trephine core biopsy is adequate. The marrow cellularity is estimated at 50-60%. Occasional large   aggregates of cells with granular cytoplasm are present, consistent with mast cells. Each aggregate contains   more than 15 cells, approximately half of which show spindle morphology.  and mast cell tryptase highlight mast cells, which are scattered   throughout the marrow and can be seen   forming occasional large aggregates, each containing more than 15 cells. The mast cells are weakly positive   for CD2. Together, the scattered and aggregated mast cells represent an estimated 15% of marrow cellularity.    -History of low back pain  -Status post torn meniscus of knee  -Bee sting allergy  -Varicose veins, no history of deep vein thrombosis  -Obesity, BMI 35     History of Present Illness: Mr. Yung is a 61 year old man with a history systemic mastocytosis, seen for routine follow-up. He had a bone marrow biopsy and aspirate on 10/16/2020 that showed multifocal dense nodules of mastocytes.  He was started on histamine blockers-cetirizine 10 mg twice daily and famotidine 20 mg twice daily.  Cromolyn four times daily was added in March 2022 and Allegra was added in June 2022      Interval History:  He is not sure if adding cromolyn and allegra has made any difference in his symptoms.  He does not have any new or progressive symptoms, he just isn't seeing any improvement in the cutaneous symptoms that he has.  He has a flare every time he showers, and additional flares throughout the day anytime he sweats, if he sleeps on one side too long, etc. The pruritis \"drives him nuts\" for about 30 minutes and then improves.  He has not had any coughing or wheezing.  No abdominal pain, cramping, diarrhea.        Physical exam:  /78   Pulse " 55   Temp 98.5  F (36.9  C) (Oral)   Resp 16   Wt 105.7 kg (233 lb)   SpO2 98%   BMI 34.41 kg/m      Gen: alert, pleasant and conversational, NAD  HEENT: NC/AT,EOMI w/ PERRL, anicteric sclera. OP clear. MMM.   Neck: Supple, no LAD  CV: normal S1,S2 with RRR no m/r/g  Resp: lungs CTA bilaterally with adequate air movement to bases. No wheezes or crackles  Abd: soft NTND no organomegaly or masses. BS normoactive.   Ext: warm and well perfused, no edema or cyanosis  Skin: diffuse erythema and macular rash on skin (arms, legs, abdomen, back, chest), more confluent on his bilateral flank   Neuro: A&Ox4, no lateralizing sx. Grossly nonfocal.  Psych: appropriate, reactive      Labs:  I personally reviewed the following labs:    Most Recent 3 CBC's:  Recent Labs   Lab Test 08/24/22  1143 10/16/20  0956 11/23/19  1242   WBC 6.1 6.2 6.2   HGB 13.7 14.0 14.0   MCV 91 93 92    270 270     Most Recent 3 BMP's:  Recent Labs   Lab Test 08/24/22  1143 10/16/20  0956 11/23/19  1242    140 138   POTASSIUM 4.9 4.2 4.3   CHLORIDE 106 108 106   CO2 30 28 31   BUN 16 16 18   CR 0.85 0.77 0.83   ANIONGAP 6 4 1*   MAEGAN 8.9 8.6 8.6   GLC 77 96 90     Most Recent 2 LFT's:  Recent Labs   Lab Test 08/24/22  1143 10/16/20  0956   AST 11 11   ALT 20 19   ALKPHOS 79 84   BILITOTAL 0.5 0.6     Tryptase is pending    Imaging:  DEXA 8/24/22  Impression  The most negative and valid T-score of -1.2 at the level of the right femoral neck and right total hip corresponds with low bone density according to WHO criteria for postmenopausal females and men age 50 and over. The risk of osteoporotic fracture increases approximately 2-fold for each 1.0 SD decrease in T-score.     Results   Lumbar spine   T-score -0.3 , BMD 1.190 g/cm2.      Left Femur neck  T-score -0.8 , BMD 0.960 g/cm2.  Left Total hip  T-score -0.4 , BMD 1.050 g/cm2.     Right Femur neck  T-score -1.2, BMD  0.910 g/cm2.  Right Total hip  T-score -1.2 , BMD  0.933  g/cm2.     Interval change  Bone density compared to the prior study has changed at lumbar spine by +7.0%.   Please note that the differential diagnosis of BMD increase in the spine includes improvement due to pharmacotherapy vs inter-current progression of spine degeneration or fracture     Percent changes not mentioned or within remaining regions are insignificant    Assessment and recommendation:  Indolent systemic mastocytosis-he is having some fairly significant symptoms of skin flushing, but at this point is not having many other symptoms to go along with more aggressive systemic mastocytosis.  He is not having much for GI or pulmonary symptoms.  Skin symptoms have not improved much despite the addition of Allegra.  He met with Dr. Prakash in June and discussed UVA therapy, but travel distance was a barrier to the patient.  He is open to further discussion today and would potentially re-consider this therapy option.  Dr. Prakash's note recommended 2-month follow-up; I will reach out to his team to get this scheduled.    Complete abdominal ultrasound with Doppler was performed in March 2022, no evidence of hepatosplenomegaly, so there is no strong indication for chemotherapy.    He started cromolyn 200 mg p.o. 4 times a day in March.  He should continue to take the cetirizine, famotidine, Allegra, and vitamin D.    DEXA shows stable/slightly improved T-scores; no evidence of declining bone density.    Lab and follow-up with Dr. Sharpe in 6 months.    51 minutes spent on the date of the encounter doing chart review, review of test results, patient visit and documentation     LARA Bryant CNP  [facility] [address] [address] [phone]

## 2022-08-24 ENCOUNTER — ONCOLOGY VISIT (OUTPATIENT)
Dept: ONCOLOGY | Facility: CLINIC | Age: 62
End: 2022-08-24
Attending: INTERNAL MEDICINE
Payer: COMMERCIAL

## 2022-08-24 ENCOUNTER — LAB (OUTPATIENT)
Dept: LAB | Facility: CLINIC | Age: 62
End: 2022-08-24
Payer: COMMERCIAL

## 2022-08-24 ENCOUNTER — ANCILLARY PROCEDURE (OUTPATIENT)
Dept: BONE DENSITY | Facility: CLINIC | Age: 62
End: 2022-08-24
Attending: INTERNAL MEDICINE
Payer: COMMERCIAL

## 2022-08-24 VITALS
RESPIRATION RATE: 16 BRPM | HEART RATE: 55 BPM | SYSTOLIC BLOOD PRESSURE: 123 MMHG | BODY MASS INDEX: 34.41 KG/M2 | OXYGEN SATURATION: 98 % | WEIGHT: 233 LBS | TEMPERATURE: 98.5 F | DIASTOLIC BLOOD PRESSURE: 78 MMHG

## 2022-08-24 DIAGNOSIS — D47.02 INDOLENT SYSTEMIC MASTOCYTOSIS: ICD-10-CM

## 2022-08-24 DIAGNOSIS — D47.02 INDOLENT SYSTEMIC MASTOCYTOSIS: Primary | ICD-10-CM

## 2022-08-24 LAB
ALBUMIN SERPL-MCNC: 3.7 G/DL (ref 3.4–5)
ALP SERPL-CCNC: 79 U/L (ref 40–150)
ALT SERPL W P-5'-P-CCNC: 20 U/L (ref 0–70)
ANION GAP SERPL CALCULATED.3IONS-SCNC: 6 MMOL/L (ref 3–14)
AST SERPL W P-5'-P-CCNC: 11 U/L (ref 0–45)
BASOPHILS # BLD AUTO: 0 10E3/UL (ref 0–0.2)
BASOPHILS NFR BLD AUTO: 1 %
BILIRUB SERPL-MCNC: 0.5 MG/DL (ref 0.2–1.3)
BUN SERPL-MCNC: 16 MG/DL (ref 7–30)
CALCIUM SERPL-MCNC: 8.9 MG/DL (ref 8.5–10.1)
CHLORIDE BLD-SCNC: 106 MMOL/L (ref 94–109)
CO2 SERPL-SCNC: 30 MMOL/L (ref 20–32)
CREAT SERPL-MCNC: 0.85 MG/DL (ref 0.66–1.25)
EOSINOPHIL # BLD AUTO: 0.1 10E3/UL (ref 0–0.7)
EOSINOPHIL NFR BLD AUTO: 2 %
ERYTHROCYTE [DISTWIDTH] IN BLOOD BY AUTOMATED COUNT: 12.4 % (ref 10–15)
GFR SERPL CREATININE-BSD FRML MDRD: >90 ML/MIN/1.73M2
GLUCOSE BLD-MCNC: 77 MG/DL (ref 70–99)
HCT VFR BLD AUTO: 41.4 % (ref 40–53)
HGB BLD-MCNC: 13.7 G/DL (ref 13.3–17.7)
IMM GRANULOCYTES # BLD: 0 10E3/UL
IMM GRANULOCYTES NFR BLD: 0 %
LYMPHOCYTES # BLD AUTO: 2.5 10E3/UL (ref 0.8–5.3)
LYMPHOCYTES NFR BLD AUTO: 42 %
MCH RBC QN AUTO: 30.2 PG (ref 26.5–33)
MCHC RBC AUTO-ENTMCNC: 33.1 G/DL (ref 31.5–36.5)
MCV RBC AUTO: 91 FL (ref 78–100)
MONOCYTES # BLD AUTO: 0.4 10E3/UL (ref 0–1.3)
MONOCYTES NFR BLD AUTO: 6 %
NEUTROPHILS # BLD AUTO: 3 10E3/UL (ref 1.6–8.3)
NEUTROPHILS NFR BLD AUTO: 49 %
NRBC # BLD AUTO: 0 10E3/UL
NRBC BLD AUTO-RTO: 0 /100
PLATELET # BLD AUTO: 270 10E3/UL (ref 150–450)
POTASSIUM BLD-SCNC: 4.9 MMOL/L (ref 3.4–5.3)
PROT SERPL-MCNC: 7.9 G/DL (ref 6.8–8.8)
RBC # BLD AUTO: 4.53 10E6/UL (ref 4.4–5.9)
SODIUM SERPL-SCNC: 142 MMOL/L (ref 133–144)
WBC # BLD AUTO: 6.1 10E3/UL (ref 4–11)

## 2022-08-24 PROCEDURE — 83520 IMMUNOASSAY QUANT NOS NONAB: CPT | Mod: 90 | Performed by: PATHOLOGY

## 2022-08-24 PROCEDURE — 99215 OFFICE O/P EST HI 40 MIN: CPT | Performed by: REGISTERED NURSE

## 2022-08-24 PROCEDURE — 77080 DXA BONE DENSITY AXIAL: CPT | Performed by: INTERNAL MEDICINE

## 2022-08-24 PROCEDURE — 99000 SPECIMEN HANDLING OFFICE-LAB: CPT | Performed by: PATHOLOGY

## 2022-08-24 PROCEDURE — G0463 HOSPITAL OUTPT CLINIC VISIT: HCPCS

## 2022-08-24 PROCEDURE — 85025 COMPLETE CBC W/AUTO DIFF WBC: CPT | Performed by: PATHOLOGY

## 2022-08-24 PROCEDURE — 36415 COLL VENOUS BLD VENIPUNCTURE: CPT | Performed by: PATHOLOGY

## 2022-08-24 PROCEDURE — 80053 COMPREHEN METABOLIC PANEL: CPT | Performed by: PATHOLOGY

## 2022-08-24 ASSESSMENT — PAIN SCALES - GENERAL: PAINLEVEL: MILD PAIN (2)

## 2022-08-24 NOTE — LETTER
8/24/2022         RE: Ren Yung  [address]  [facility] [address]        Dear Colleague,    Thank you for referring your patient, Ren Yung, to the [facility]. Please see a copy of my visit note below.    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    The trephine core biopsy is adequate. The marrow cellularity is estimated at 50-60%. Occasional large   aggregates of cells with granular cytoplasm are present, consistent with mast cells. Each aggregate contains   more than 15 cells, approximately half of which show spindle morphology.  and mast cell tryptase highlight mast cells, which are scattered   throughout the marrow and can be seen   forming occasional large aggregates, each containing more than 15 cells. The mast cells are weakly positive   for CD2. Together, the scattered and aggregated mast cells represent an estimated 15% of marrow cellularity.    -History of low back pain  -Status post torn meniscus of knee  -Bee sting allergy  -Varicose veins, no history of deep vein thrombosis  -Obesity, BMI 35     History of Present Illness: Mr. Yung is a 61 year old man with a history systemic mastocytosis, seen for routine follow-up. He had a bone marrow biopsy and aspirate on 10/16/2020 that showed multifocal dense nodules of mastocytes.  He was started on histamine blockers-cetirizine 10 mg twice daily and famotidine 20 mg twice daily.  Cromolyn four times daily was added in March 2022 and Allegra was added in June 2022      Interval History:  He is not sure if adding cromolyn and allegra has made any difference in his symptoms.  He does not have any new or progressive symptoms, he just isn't seeing any improvement in the cutaneous symptoms that he has.  He has a flare every time he showers, and additional flares throughout  "the day anytime he sweats, if he sleeps on one side too long, etc. The pruritis \"drives him nuts\" for about 30 minutes and then improves.  He has not had any coughing or wheezing.  No abdominal pain, cramping, diarrhea.        Physical exam:  /78   Pulse 55   Temp 98.5  F (36.9  C) (Oral)   Resp 16   Wt 105.7 kg (233 lb)   SpO2 98%   BMI 34.41 kg/m      Gen: alert, pleasant and conversational, NAD  HEENT: NC/AT,EOMI w/ PERRL, anicteric sclera. OP clear. MMM.   Neck: Supple, no LAD  CV: normal S1,S2 with RRR no m/r/g  Resp: lungs CTA bilaterally with adequate air movement to bases. No wheezes or crackles  Abd: soft NTND no organomegaly or masses. BS normoactive.   Ext: warm and well perfused, no edema or cyanosis  Skin: diffuse erythema and macular rash on skin (arms, legs, abdomen, back, chest), more confluent on his bilateral flank   Neuro: A&Ox4, no lateralizing sx. Grossly nonfocal.  Psych: appropriate, reactive      Labs:  I personally reviewed the following labs:    Most Recent 3 CBC's:  Recent Labs   Lab Test 08/24/22  1143 10/16/20  0956 11/23/19  1242   WBC 6.1 6.2 6.2   HGB 13.7 14.0 14.0   MCV 91 93 92    270 270     Most Recent 3 BMP's:  Recent Labs   Lab Test 08/24/22  1143 10/16/20  0956 11/23/19  1242    140 138   POTASSIUM 4.9 4.2 4.3   CHLORIDE 106 108 106   CO2 30 28 31   BUN 16 16 18   CR 0.85 0.77 0.83   ANIONGAP 6 4 1*   MAEGAN 8.9 8.6 8.6   GLC 77 96 90     Most Recent 2 LFT's:  Recent Labs   Lab Test 08/24/22  1143 10/16/20  0956   AST 11 11   ALT 20 19   ALKPHOS 79 84   BILITOTAL 0.5 0.6     Tryptase is pending    Imaging:  DEXA 8/24/22  Impression  The most negative and valid T-score of -1.2 at the level of the right femoral neck and right total hip corresponds with low bone density according to WHO criteria for postmenopausal females and men age 50 and over. The risk of osteoporotic fracture increases approximately 2-fold for each 1.0 SD decrease in T-score.     Results "   Lumbar spine   T-score -0.3 , BMD 1.190 g/cm2.      Left Femur neck  T-score -0.8 , BMD 0.960 g/cm2.  Left Total hip  T-score -0.4 , BMD 1.050 g/cm2.     Right Femur neck  T-score -1.2, BMD  0.910 g/cm2.  Right Total hip  T-score -1.2 , BMD  0.933 g/cm2.     Interval change  Bone density compared to the prior study has changed at lumbar spine by +7.0%.   Please note that the differential diagnosis of BMD increase in the spine includes improvement due to pharmacotherapy vs inter-current progression of spine degeneration or fracture     Percent changes not mentioned or within remaining regions are insignificant    Assessment and recommendation:  Indolent systemic mastocytosis-he is having some fairly significant symptoms of skin flushing, but at this point is not having many other symptoms to go along with more aggressive systemic mastocytosis.  He is not having much for GI or pulmonary symptoms.  Skin symptoms have not improved much despite the addition of Allegra.  He met with Dr. Prakash in June and discussed [facility] therapy, but travel distance was a barrier to the patient.  He is open to further discussion today and would potentially re-consider this therapy option.  Dr. Prakash's note recommended 2-month follow-up; I will reach out to his team to get this scheduled.    Complete abdominal ultrasound with Doppler was performed in March 2022, no evidence of hepatosplenomegaly, so there is no strong indication for chemotherapy.    He started cromolyn 200 mg p.o. 4 times a day in March.  He should continue to take the cetirizine, famotidine, Allegra, and vitamin D.    DEXA shows stable/slightly improved T-scores; no evidence of declining bone density.    Lab and follow-up with Dr. Sharpe in 6 months.    51 minutes spent on the date of the encounter doing chart review, review of test results, patient visit and documentation       LARA Bryant CNP  [facility] [address]  [address]  [phone]

## 2022-08-24 NOTE — NURSING NOTE
"Oncology Rooming Note    August 24, 2022 1:24 PM   Ren Yung is a 61 year old male who presents for:    Chief Complaint   Patient presents with     Oncology Clinic Visit     Indolent systemic mastocytosis     Initial Vitals: /78   Pulse 55   Temp 98.5  F (36.9  C) (Oral)   Resp 16   Wt 105.7 kg (233 lb)   SpO2 98%   BMI 34.41 kg/m   Estimated body mass index is 34.41 kg/m  as calculated from the following:    Height as of 9/15/15: 1.753 m (5' 9\").    Weight as of this encounter: 105.7 kg (233 lb). Body surface area is 2.27 meters squared.  Mild Pain (2) Comment: Data Unavailable   No LMP for male patient.  Allergies reviewed: Yes  Medications reviewed: Yes    Medications: Medication refills not needed today.  Pharmacy name entered into Per[employer]:    [facility] [address] [place] [address] - [address] [place] [address] PHARMACY [place], [place] - [address] [facility] [phone]    Clinical concerns: none       Ysabel Mcdonough CMA            "

## 2022-08-26 LAB — TRYPTASE SERPL-MCNC: 176 UG/L

## 2022-09-07 DIAGNOSIS — D47.02 INDOLENT SYSTEMIC MASTOCYTOSIS: Primary | ICD-10-CM

## 2022-09-07 RX ORDER — MONTELUKAST SODIUM 10 MG/1
10 TABLET ORAL AT BEDTIME
Qty: 90 TABLET | Refills: 3 | Status: SHIPPED | OUTPATIENT
Start: 2022-09-07 | End: 2023-09-20

## 2022-10-17 DIAGNOSIS — D47.02 INDOLENT SYSTEMIC MASTOCYTOSIS: ICD-10-CM

## 2022-10-18 RX ORDER — CROMOLYN SODIUM 100 MG/5ML
SOLUTION, CONCENTRATE ORAL
Qty: 1200 ML | Refills: 3 | Status: SHIPPED | OUTPATIENT
Start: 2022-10-18 | End: 2023-06-06

## 2022-10-18 NOTE — TELEPHONE ENCOUNTER
Cromolyn Refill   Last prescribing provider: Dr Sharpe     Last clinic visit date: 8/24/22 [facility] Rosa     Any missed appointments or no-shows since last clinic visit?: no     Recommendations for requested medication (if none, N/A): Copied from chart note 8/24/22 Rositalorri Rosa Xavier   He started cromolyn 200 mg p.o. 4 times a day in March. He should continue to take the cetirizine, famotidine, Allegra, and vitamin D.      Next clinic visit date: 2/23/22 Dr Sharpe     Any other pertinent information (if none, N/A): N/A

## 2022-11-20 ENCOUNTER — HEALTH MAINTENANCE LETTER (OUTPATIENT)
Age: 62
End: 2022-11-20

## 2023-02-20 ENCOUNTER — DOCUMENTATION ONLY (OUTPATIENT)
Dept: LAB | Facility: CLINIC | Age: 63
End: 2023-02-20
Payer: COMMERCIAL

## 2023-02-20 DIAGNOSIS — D47.02 INDOLENT SYSTEMIC MASTOCYTOSIS: Primary | ICD-10-CM

## 2023-02-21 ENCOUNTER — PATIENT OUTREACH (OUTPATIENT)
Dept: ONCOLOGY | Facility: CLINIC | Age: 63
End: 2023-02-21
Payer: COMMERCIAL

## 2023-02-21 NOTE — PROGRESS NOTES
[facility]: [facility]                                                                                          Lab order faxed to [place] so pt can get drawn there.          Signature:  Rosie Hi RN

## 2023-02-23 ENCOUNTER — VIRTUAL VISIT (OUTPATIENT)
Dept: ONCOLOGY | Facility: CLINIC | Age: 63
End: 2023-02-23
Attending: REGISTERED NURSE
Payer: COMMERCIAL

## 2023-02-23 DIAGNOSIS — D47.02 INDOLENT SYSTEMIC MASTOCYTOSIS: Primary | ICD-10-CM

## 2023-02-23 PROCEDURE — 99215 OFFICE O/P EST HI 40 MIN: CPT | Mod: VID | Performed by: INTERNAL MEDICINE

## 2023-02-23 NOTE — PROGRESS NOTES
Video-Visit Details    Type of service:  Video Visit    Video Start Time (time video started): 10:33 am    Video End Time (time video stopped): 10:50 am    Originating Location (pt. Location): Home        Distant Location (provider location):  Off-site  Due to snowstorm.    Mode of Communication:  Video Conference via [employer]

## 2023-02-23 NOTE — NURSING NOTE
Is the patient currently in the state of [place]? YES    Visit mode:VIDEO    If the visit is dropped, the patient can be reconnected by: VIDEO VISIT: Text to cell phone: [phone]    Will anyone else be joining the visit? YES: How would they like to receive their invitation? Other e-mail: wife      How would you like to obtain your AVS? MyChart    Are changes needed to the allergy or medication list? YES: allegra    Reason for visit: follow up.  Patient states he has new pictures to provide.     Roseanne Hunt

## 2023-02-23 NOTE — PROGRESS NOTES
Hematology Clinic consult note  Video visit     Problem list:   -Indolent systemic mastocytosis-bone marrow biopsy 10/16/2020. 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     The trephine core biopsy is adequate. The marrow cellularity is estimated at 50-60%. Occasional large   aggregates of cells with granular cytoplasm are present, consistent with mast cells. Each aggregate contains   more than 15 cells, approximately half of which show spindle morphology.  and mast cell tryptase highlight mast cells, which are scattered   throughout the marrow and can be seen   forming occasional large aggregates, each containing more than 15 cells. The mast cells are weakly positive   for CD2. Together, the scattered and aggregated mast cells represent an estimated 15% of marrow cellularity.     -History of low back pain  -Status post torn meniscus of knee  -Bee sting allergy  -Varicose veins, no history of deep vein thrombosis  -Obesity, BMI 35     History of Present Illness: Mr. Yung is a 59 year old man with a history indolent systemic mastocytosis, seen by video for routine follow-up.He is accompanied by his wife.  His main problem is skiin rash with pruritis. He takes cromolyn, cetirazine and famotadine, and fexofenadine (Allegra). He has been taking all of these meds, not sure if helping. He has a home ngUVB machine, prescribed by Dr. Prakash, dermatology, but he has not been using this. His main complaint is severe rash that flares, causing pruritis and burning pain. The rash is diffuse across chest, abdomen, back. He reports that if he scatches the rash , it seems to become more intensely red and painfull. Also if he takes a shower, it makes the rash and burnng sensation much worse. Has several flared of the rash per day. Occasionally feels a bit light headed (not vertigo) when standing, but not clearly associated with flare of the rash. He has not passed out.  No throat tightness, coughing or wheezing. No palpitations. No abdominal pain or diarrhea.     He is still working, owns a small manufacturing business.     Physical exam:    General appearance:  Patient is 62 year old man in no acute distress.     HEENT:  No pallor, icterus.   Lungs: Normal respirations, no cough or audible wheezes.      Skin:  Diffuse erythematous rash on chest, abdomen, lower back. .    The rest of a comprehensive physical examination is deferred due to public health emergency video visit restrictions.    Labs:  Care Everywhere 2/22/2023  Tryptase pending  WBC 3.5 - 10.5 x10(9)/L 6.8    RBC 4.32 - 5.72 x10(12)/L 4.59    Hemoglobin 13.5 - 17.5 g/dL 13.5    HCT 38.8 - 50.0 % 41.6    MCV 80.0 - 100.0 fL 90.6    MCH 27.6 - 33.3 pg 29.4    MCHC 31.5 - 35.2 g/dL 32.5    RDW 11.9 - 15.5 % 12.6    Platelets 150 - 450 x10(9)/L 298    Neutrophil Absolute 1.7 - 7.0 10(9)/L 2.9    Lymphocyte Absolute 1.0 - 4.8 10(9)/L 3.2    Monocytes Absolute 0.2 - 0.9 10(9)/L 0.5    Eosinophil Absolute 0.0 - 0.5 10(9)/L 0.1    Basophil Absolute 0.0 - 0.3 10(9)/L 0.0    Immature Gran % 0.0 - 0.5 % 0.1    Resulting Agency  [facility]   Specimen Collected: 02/22/23  8:07 AM Last Resulted: 02/22/23  8:11 AM   Received From: [facility]  Result Received: 02/22/23 11:09 AM     Sodium 136 - 145 mmol/L 140    Potassium 3.5 - 5.1 mmol/L 4.8    Chloride 98 - 109 mmol/L 105    CO2 20 - 29 mmol/L 27    Anion Gap 7 - 16 mmol/L 8    Calcium 8.4 - 10.4 mg/dL 9.2    BUN 7 - 26 mg/dL 16    Creatinine 0.73 - 1.18 mg/dL 0.90    Alkaline Phosphatase 40 - 150 U/L 72    AST (SGOT) 10 - 40 U/L 15    ALT (SGPT) <=55 U/L 22    Bilirubin, Total 0.2 - 1.2 mg/dL 0.4    Protein, Total 6.4 - 8.3 g/dL 7.2    Albumin 3.5 - 5.0 g/dL 4.0    Glucose 70 - 100 mg/dL 108 High     Comment: The given reference range is for the fasting state. Non-fasting       - 235 U/L 111 Low        Imaging:  EXAMINATION: US ABDOMEN COMPLETE WITH DOPPLER  "COMPLETE 3/23/2022 11:45  AM                                                            Impression:   1.  No hepatosplenomegaly.  2.  Cholelithiasis without evidence of cholecystitis.  3.  Suspect a few sub-5 mm gallbladder polyps which are statistically  benign and no further follow-up is needed.     JARRED METCALF MD        Assessment and recommendation:  Indolent systemic mastocytosis-he is having some fairly significant symptoms of skin flushing, but at this point is not having many other symptoms to go along with more aggressive systemic mastocytosis.       He did not have heptosplenomegaly on complete abdominal ultrasound 3/22/22. His CBC and liver enzymes are normal, with tryptase pending. If the tryptase is >200 mcg/L, then consider repeat BM Bx, abdominal ultrasound, skeletal imaging to look for lytic lesions. He does not have recurrent anaphylaxis as a reason to give chemotherapy for the systemic mastocytosis. Discussed with him that since there is toxicity with chemotherapy, would like to avoid using chemo for skin symptoms. Review articles suggest trying aspirin (need to be observed with initiation due to risk of maximiliano;hylaxis) and PUVA therapy. Midastaurin when truly refractory to other measures, even if doesn't meet criteria for more aggressive mastocytosis. I strongly urge him to follow up in dermatology, and consider UVA therapy, even if it is incovenient to go to clinic so frequently.     I will be in touch with him about the tryptase result.       -Continue cromolyn  cetirizine, famotidine, fexofendine, vitamin D.  -make f/up appt in dermatology  -Keep April Appointment with [facility].     Reference:  Mark Villatoro \"Systemic mastocytosis in adults: 2021 Update on diagnosis, risk stratification and management\"Am J Hematol. 2021 Apr 1;96(4):508-525. doi: 10.1002/ajh.20477. Epub 2021 Feb 21.    Time: I spent a total of 45 minutes on the day of the visit. Please see the note for further information on " patient assessment and treatment.     Yadira Sharpe MD  Hematology

## 2023-02-23 NOTE — LETTER
2/23/2023         RE: Ren Yung  [address] [facility] [address]        Dear Colleague,    Thank you for referring your patient, Ren Yung, to the [facility]. Please see a copy of my visit note below.    Hematology Clinic consult note  Video visit     Problem list:   -Indolent systemic mastocytosis-bone marrow biopsy 10/16/2020. 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     The trephine core biopsy is adequate. The marrow cellularity is estimated at 50-60%. Occasional large   aggregates of cells with granular cytoplasm are present, consistent with mast cells. Each aggregate contains   more than 15 cells, approximately half of which show spindle morphology.  and mast cell tryptase highlight mast cells, which are scattered   throughout the marrow and can be seen   forming occasional large aggregates, each containing more than 15 cells. The mast cells are weakly positive   for CD2. Together, the scattered and aggregated mast cells represent an estimated 15% of marrow cellularity.     -History of low back pain  -Status post torn meniscus of knee  -Bee sting allergy  -Varicose veins, no history of deep vein thrombosis  -Obesity, BMI 35     History of Present Illness: Mr. Yung is a 59 year old man with a history indolent systemic mastocytosis, seen by video for routine follow-up.He is accompanied by his wife.  His main problem is skiin rash with pruritis. He takes cromolyn, cetirazine and famotadine, and fexofenadine (Allegra). He has been taking all of these meds, not sure if helping. He has a home ngUVB machine, prescribed by Dr. Prakash, dermatology, but he has not been using this. His main complaint is severe rash that flares, causing pruritis and burning pain. The rash is diffuse across chest, abdomen, back. He reports that if he scatches the rash , it seems to become more intensely red and painfull.  Also if he takes a shower, it makes the rash and burnng sensation much worse. Has several flared of the rash per day. Occasionally feels a bit light headed (not vertigo) when standing, but not clearly associated with flare of the rash. He has not passed out. No throat tightness, coughing or wheezing. No palpitations. No abdominal pain or diarrhea.     He is still working, owns a small manufacturing business.     Physical exam:    General appearance:  Patient is 62 year old man in no acute distress.     HEENT:  No pallor, icterus.   Lungs: Normal respirations, no cough or audible wheezes.      Skin:  Diffuse erythematous rash on chest, abdomen, lower back. .    The rest of a comprehensive physical examination is deferred due to public [facility] emergency video visit restrictions.    Labs:  Care Everywhere 2/22/2023  Tryptase pending  WBC 3.5 - 10.5 x10(9)/L 6.8    RBC 4.32 - 5.72 x10(12)/L 4.59    Hemoglobin 13.5 - 17.5 g/dL 13.5    HCT 38.8 - 50.0 % 41.6    MCV 80.0 - 100.0 fL 90.6    MCH 27.6 - 33.3 pg 29.4    MCHC 31.5 - 35.2 g/dL 32.5    RDW 11.9 - 15.5 % 12.6    Platelets 150 - 450 x10(9)/L 298    Neutrophil Absolute 1.7 - 7.0 10(9)/L 2.9    Lymphocyte Absolute 1.0 - 4.8 10(9)/L 3.2    Monocytes Absolute 0.2 - 0.9 10(9)/L 0.5    Eosinophil Absolute 0.0 - 0.5 10(9)/L 0.1    Basophil Absolute 0.0 - 0.3 10(9)/L 0.0    Immature Gran % 0.0 - 0.5 % 0.1    Resulting Agency  [facility]   Specimen Collected: 02/22/23  8:07 AM Last Resulted: 02/22/23  8:11 AM   Received From: [facility]  Result Received: 02/22/23 11:09 AM     Sodium 136 - 145 mmol/L 140    Potassium 3.5 - 5.1 mmol/L 4.8    Chloride 98 - 109 mmol/L 105    CO2 20 - 29 mmol/L 27    Anion Gap 7 - 16 mmol/L 8    Calcium 8.4 - 10.4 mg/dL 9.2    BUN 7 - 26 mg/dL 16    Creatinine 0.73 - 1.18 mg/dL 0.90    Alkaline Phosphatase 40 - 150 U/L 72    AST (SGOT) 10 - 40 U/L 15    ALT (SGPT) <=55 U/L 22    Bilirubin, Total 0.2 - 1.2 mg/dL 0.4    Protein,  "Total 6.4 - 8.3 g/dL 7.2    Albumin 3.5 - 5.0 g/dL 4.0    Glucose 70 - 100 mg/dL 108 High     Comment: The given reference range is for the fasting state. Non-fasting       - 235 U/L 111 Low        Imaging:  EXAMINATION: US ABDOMEN COMPLETE WITH DOPPLER COMPLETE 3/23/2022 11:45  AM                                                            Impression:   1.  No hepatosplenomegaly.  2.  Cholelithiasis without evidence of cholecystitis.  3.  Suspect a few sub-5 mm gallbladder polyps which are statistically  benign and no further follow-up is needed.     JARRED METCALF MD        Assessment and recommendation:  Indolent systemic mastocytosis-he is having some fairly significant symptoms of skin flushing, but at this point is not having many other symptoms to go along with more aggressive systemic mastocytosis.       He did not have heptosplenomegaly on complete abdominal ultrasound 3/22/22. His CBC and liver enzymes are normal, with tryptase pending. If the tryptase is >200 mcg/L, then consider repeat BM Bx, abdominal ultrasound, skeletal imaging to look for lytic lesions. He does not have recurrent anaphylaxis as a reason to give chemotherapy for the systemic mastocytosis. Discussed with him that since there is toxicity with chemotherapy, would like to avoid using chemo for skin symptoms. Review articles suggest trying aspirin (need to be observed with initiation due to risk of maximiliano;hylaxis) and PUVA therapy. Midastaurin when truly refractory to other measures, even if doesn't meet criteria for more aggressive mastocytosis. I strongly urge him to follow up in dermatology, and consider UVA therapy, even if it is incovenient to go to clinic so frequently.     I will be in touch with him about the tryptase result.       -Continue cromolyn  cetirizine, famotidine, fexofendine, vitamin D.  -make f/up appt in dermatology  -Keep April Appointment with [facility].     Reference:  Mark Villatoro \"Systemic mastocytosis in " "adults: 2021 Update on diagnosis, risk stratification and management\"Am J Hematol. 2021 Apr 1;96(4):508-525. doi: 10.1002/ajh.66948. Epub 2021 Feb 21.    Time: I spent a total of 45 minutes on the day of the visit. Please see the note for further information on patient assessment and treatment.     Yadira Sharpe MD  Hematology    Video-Visit Details    Type of service:  Video Visit    Video Start Time (time video started): 10:33 am    Video End Time (time video stopped): 10:50 am    Originating Location (pt. Location): Home        Distant Location (provider location):  Off-site  Due to snowstorm.    Mode of Communication:  Video Conference via [employer]      "

## 2023-03-09 ENCOUNTER — OFFICE VISIT (OUTPATIENT)
Dept: DERMATOLOGY | Facility: CLINIC | Age: 63
End: 2023-03-09
Payer: COMMERCIAL

## 2023-03-09 ENCOUNTER — TELEPHONE (OUTPATIENT)
Dept: DERMATOLOGY | Facility: CLINIC | Age: 63
End: 2023-03-09

## 2023-03-09 DIAGNOSIS — L50.1 CHRONIC IDIOPATHIC URTICARIA: Primary | ICD-10-CM

## 2023-03-09 DIAGNOSIS — D47.09 MASTOCYTOSIS: ICD-10-CM

## 2023-03-09 PROCEDURE — 99214 OFFICE O/P EST MOD 30 MIN: CPT | Mod: GC | Performed by: STUDENT IN AN ORGANIZED HEALTH CARE EDUCATION/TRAINING PROGRAM

## 2023-03-09 RX ORDER — ALBUTEROL SULFATE 90 UG/1
1-2 AEROSOL, METERED RESPIRATORY (INHALATION)
Status: CANCELLED
Start: 2023-03-13

## 2023-03-09 RX ORDER — MEPERIDINE HYDROCHLORIDE 25 MG/ML
25 INJECTION INTRAMUSCULAR; INTRAVENOUS; SUBCUTANEOUS EVERY 30 MIN PRN
Status: CANCELLED | OUTPATIENT
Start: 2023-03-13

## 2023-03-09 RX ORDER — ALBUTEROL SULFATE 0.83 MG/ML
2.5 SOLUTION RESPIRATORY (INHALATION)
Status: CANCELLED | OUTPATIENT
Start: 2023-03-13

## 2023-03-09 RX ORDER — METHYLPREDNISOLONE SODIUM SUCCINATE 125 MG/2ML
125 INJECTION, POWDER, LYOPHILIZED, FOR SOLUTION INTRAMUSCULAR; INTRAVENOUS
Status: CANCELLED
Start: 2023-03-13

## 2023-03-09 RX ORDER — DIPHENHYDRAMINE HYDROCHLORIDE 50 MG/ML
50 INJECTION INTRAMUSCULAR; INTRAVENOUS
Status: CANCELLED
Start: 2023-03-13

## 2023-03-09 RX ORDER — EPINEPHRINE 1 MG/ML
0.3 INJECTION, SOLUTION, CONCENTRATE INTRAVENOUS EVERY 5 MIN PRN
Status: CANCELLED | OUTPATIENT
Start: 2023-03-13

## 2023-03-09 ASSESSMENT — PAIN SCALES - GENERAL: PAINLEVEL: SEVERE PAIN (6)

## 2023-03-09 NOTE — LETTER
3/9/2023       RE: Ren Yung  [address]  [facility] [address]     Dear Colleague,    Thank you for referring your patient, Ren Yung, to the [facility] DERMATOLOGY CLINIC [place] at [facility]. Please see a copy of my visit note below.    [facility] Dermatology Note  Encounter Date: Mar 9, 2023  Date of Last Dermatology Office Visit: Visit date not found       Dermatology Problem List:  #. Mastocytosis, indolent  #. Chronic idiopathic urticaria  - S/p biopsy 8/11/2020  - Current treatment: cromolyn sodium, montelukast, Singulair, cetirizine, topical Benadryl,   - Prior tx: prednisone taper, NBUVB (could not continue due to pain),   2. Inflamed KP vs folliculitis, last evaluated by Dr. Rogers on 9/15/2015.    - prior tx: urea cream, diflucan     ____________________________________________    Assessment & Plan:     #. Cutaneous and systemic mastocytosis  #. Chronic idiopathic urticaria  With failure of several first-line therapies to date, to include antihistamines, home light therapy, topical antihistamines, and cromolyn sodium despite compliance with these therapies. Discussed with the patient the next step in the therapeutic ladder, which would be Xolair. Cautioned patient on the risk of anaphylaxis, and need for initial treatment to be administered in an infusion center. Despite recent decrease in tryptase, Mr. Yung continues to experience debilitating symptoms and reduced quality of life due to his disease burden.   - Start Xolair; infusion orders placed today.   - Continue topical benadryl, systemic cetirizine.   - Continue home light therapy as tolerated.   - Continue cromolyn sodium.   - Return to clinic in 2 months.     Procedures Performed:  None    Follow-up: 2 month(s) in-person, or earlier for new or changing lesions    Staff and Resident:     Staff: Dr. Ubaldo Jane,  MD  PGY-3, Internal Medicine-Dermatology  Pager: TextPage Link  ____________________________________________    CC: Derm Problem      HPI:  Mr. Ren Yung is a(n) 62 year old male who presents today as a new patient (or re-establishing care following two year hiatus) for the following chief complaint(s):     Derm Problem    Patient describes that since his last appointment, his skin symptoms continued to progress and may have even worsened.  Tarik's current treatments include cetirizine, topical Benadryl, cromolyn sodium, and home narrowband UVB, which he is unable to tolerate due to pain and discomfort.  During flares, he rates his itch as 9 out of 10 in severity while at baseline this goes down to about a 3.  Other lifestyle modifications he has made include doing a low histamine diet, which she has found minimally helpful.  Changes in temperature (heat) exercise and spicy foods are known triggers, however tactile stimulation such as light touch can also be a trigger.    Patient is otherwise feeling well, without additional skin concerns.    Labs Reviewed:  N/A    Physical Exam:  Vitals: There were no vitals taken for this visit.  General: Well developed adult. Resting comfortably; no acute distress. Conversational and cooperative with exam.  HEENT: Anicteric sclera. EOMI. Mucous membranes moist.   Skin Exam: A focused exam of the sun exposed skin on the trunk and extremities was performed.   - Russo Type II: Sun-occluded skin with minimal background pigmentation. Slightly tanned in sun-exposed areas..   - Diffusely distributed, innumerable coalescing 2-3mm red-violaceus macules on legs, arms and trunk  - No other lesions of concern on areas examined.   Psych: Appropriate mood and affect for situation.                    Medications:  Current Outpatient Medications   Medication     cetirizine (ZYRTEC) 10 MG tablet     cromolyn (GASTROCROM) 100 MG/5ML (HIGH CONC) solution     EPINEPHrine (ANY BX GENERIC  EQUIV) 0.3 MG/0.3ML injection 2-pack     famotidine (PEPCID) 20 MG tablet     HYDROcodone-acetaminophen (NORCO) 5-325 MG tablet     montelukast (SINGULAIR) 10 MG tablet     No current facility-administered medications for this visit.        Past Medical History:   Patient Active Problem List   Diagnosis     Rash     Indolent systemic mastocytosis     Mastocytosis     No past medical history on file.    CC No referring provider defined for this encounter. on close of this encounter.        Attestation signed by Ubaldo Cagle MD at 3/9/2023  3:13 PM:  I have personally examined this patient and agree with the resident doctor's documentation and plan of care. I have reviewed and amended the resident's note. The documentation accurately reflects my clinical observations, diagnoses, treatment and follow-up plans.     Indolent mastocytosis D816V+ s/p bx w/ <25% mast cells,   - no cytopenias, no LFT alk phos abnormalities  - no anaphylactic sx, pruritus is most bothersome, some fatigue, minimal GI and pulmonary sx   - ultra sound 03/22/22 w/o HSM  - tryptase 16.1  - see resident note for therapy plan       Ubaldo Cagle MD  Dermatology Staff

## 2023-03-09 NOTE — TELEPHONE ENCOUNTER
would like to prescribe Xolair to this patient.  Message sent to [facility] infusion financial  specialist

## 2023-03-09 NOTE — PATIENT INSTRUCTIONS
Watch out for a call from the infusion center for scheduling. We would like to try a medication called Xolair, which works on blocking mast cell receptors.     Come back to see us in two months.

## 2023-03-09 NOTE — NURSING NOTE
Dermatology Rooming Note    Ren Yung's goals for this visit include:   Chief Complaint   Patient presents with     Derm Problem     Patient states that flare ups seemed to have been spreading across his whole body and neck; and that they occur more common. Patient states he experiences a burning sensation on his skin from the slightest amount of pressure; and that ice packs offer some relief.      Armando De Dios, EMT-B

## 2023-03-09 NOTE — PROGRESS NOTES
[facility] Dermatology Note  Encounter Date: Mar 9, 2023  Date of Last Dermatology Office Visit: Visit date not found       Dermatology Problem List:  #. Mastocytosis, indolent  #. Chronic idiopathic urticaria  - S/p biopsy 8/11/2020  - Current treatment: cromolyn sodium, montelukast, Singulair, cetirizine, topical Benadryl,   - Prior tx: prednisone taper, NBUVB (could not continue due to pain),   2. Inflamed KP vs folliculitis, last evaluated by Dr. Rogers on 9/15/2015.    - prior tx: urea cream, diflucan     ____________________________________________    Assessment & Plan:     #. Cutaneous and systemic mastocytosis  #. Chronic idiopathic urticaria  With failure of several first-line therapies to date, to include antihistamines, home light therapy, topical antihistamines, and cromolyn sodium despite compliance with these therapies. Discussed with the patient the next step in the therapeutic ladder, which would be Xolair. Cautioned patient on the risk of anaphylaxis, and need for initial treatment to be administered in an infusion center. Despite recent decrease in tryptase, Mr. Yung continues to experience debilitating symptoms and reduced quality of life due to his disease burden.   - Start Xolair; infusion orders placed today.   - Continue topical benadryl, systemic cetirizine.   - Continue home light therapy as tolerated.   - Continue cromolyn sodium.   - Return to clinic in 2 months.   - UAS7 41    Procedures Performed:  None    Follow-up: 2 month(s) in-person, or earlier for new or changing lesions    Staff and Resident:     Staff: Dr. Ubaldo Jane MD  PGY-3, Internal Medicine-Dermatology  Pager: TextPage Link  ____________________________________________    CC: Derm Problem      HPI:  Mr. Ren Yung is a(n) 62 year old male who presents today as a new patient (or re-establishing care following two year hiatus) for the following chief complaint(s):      Derm Problem    Patient describes that since his last appointment, his skin symptoms continued to progress and may have even worsened.  Tarik's current treatments include cetirizine, topical Benadryl, cromolyn sodium, and home narrowband UVB, which he is unable to tolerate due to pain and discomfort.  During flares, he rates his itch as 9 out of 10 in severity while at baseline this goes down to about a 3.  Other lifestyle modifications he has made include doing a low histamine diet, which she has found minimally helpful.  Changes in temperature (heat) exercise and spicy foods are known triggers, however tactile stimulation such as light touch can also be a trigger.    Patient is otherwise feeling well, without additional skin concerns.    Labs Reviewed:  N/A    Physical Exam:  Vitals: There were no vitals taken for this visit.  General: Well developed adult. Resting comfortably; no acute distress. Conversational and cooperative with exam.  HEENT: Anicteric sclera. EOMI. Mucous membranes moist.   Skin Exam: A focused exam of the sun exposed skin on the trunk and extremities was performed.   - Russo Type II: Sun-occluded skin with minimal background pigmentation. Slightly tanned in sun-exposed areas..   - Diffusely distributed, innumerable coalescing 2-3mm red-violaceus macules on legs, arms and trunk  - No other lesions of concern on areas examined.   Psych: Appropriate mood and affect for situation.                    Medications:  Current Outpatient Medications   Medication     cetirizine (ZYRTEC) 10 MG tablet     cromolyn (GASTROCROM) 100 MG/5ML (HIGH CONC) solution     EPINEPHrine (ANY BX GENERIC EQUIV) 0.3 MG/0.3ML injection 2-pack     famotidine (PEPCID) 20 MG tablet     HYDROcodone-acetaminophen (NORCO) 5-325 MG tablet     montelukast (SINGULAIR) 10 MG tablet     No current facility-administered medications for this visit.        Past Medical History:   Patient Active Problem List   Diagnosis      Rash     Indolent systemic mastocytosis     Mastocytosis     No past medical history on file.    CC No referring provider defined for this encounter. on close of this encounter.

## 2023-03-11 ENCOUNTER — PATIENT OUTREACH (OUTPATIENT)
Dept: DERMATOLOGY | Facility: CLINIC | Age: 63
End: 2023-03-11
Payer: COMMERCIAL

## 2023-03-11 NOTE — TELEPHONE ENCOUNTER
Attempted to reach patient to schedule follow up in the Dermatology Clinic.  No answer,  LM on VM to call office and [employer] message sent..    Schedule with Dr. Ubaldo Cagle.

## 2023-03-24 ENCOUNTER — INFUSION THERAPY VISIT (OUTPATIENT)
Dept: INFUSION THERAPY | Facility: CLINIC | Age: 63
End: 2023-03-24
Attending: STUDENT IN AN ORGANIZED HEALTH CARE EDUCATION/TRAINING PROGRAM
Payer: COMMERCIAL

## 2023-03-24 VITALS
DIASTOLIC BLOOD PRESSURE: 91 MMHG | HEART RATE: 69 BPM | OXYGEN SATURATION: 98 % | TEMPERATURE: 97.9 F | SYSTOLIC BLOOD PRESSURE: 149 MMHG

## 2023-03-24 DIAGNOSIS — L50.1 CHRONIC IDIOPATHIC URTICARIA: Primary | ICD-10-CM

## 2023-03-24 PROCEDURE — 250N000011 HC RX IP 250 OP 636: Performed by: STUDENT IN AN ORGANIZED HEALTH CARE EDUCATION/TRAINING PROGRAM

## 2023-03-24 PROCEDURE — 96372 THER/PROPH/DIAG INJ SC/IM: CPT | Performed by: STUDENT IN AN ORGANIZED HEALTH CARE EDUCATION/TRAINING PROGRAM

## 2023-03-24 RX ORDER — EPINEPHRINE 1 MG/ML
0.3 INJECTION, SOLUTION INTRAMUSCULAR; SUBCUTANEOUS EVERY 5 MIN PRN
Status: CANCELLED | OUTPATIENT
Start: 2023-03-25

## 2023-03-24 RX ORDER — DIPHENHYDRAMINE HYDROCHLORIDE 50 MG/ML
50 INJECTION INTRAMUSCULAR; INTRAVENOUS
Status: CANCELLED
Start: 2023-03-25

## 2023-03-24 RX ORDER — ALBUTEROL SULFATE 0.83 MG/ML
2.5 SOLUTION RESPIRATORY (INHALATION)
Status: CANCELLED | OUTPATIENT
Start: 2023-03-25

## 2023-03-24 RX ORDER — MEPERIDINE HYDROCHLORIDE 25 MG/ML
25 INJECTION INTRAMUSCULAR; INTRAVENOUS; SUBCUTANEOUS EVERY 30 MIN PRN
Status: CANCELLED | OUTPATIENT
Start: 2023-03-25

## 2023-03-24 RX ORDER — ALBUTEROL SULFATE 90 UG/1
1-2 AEROSOL, METERED RESPIRATORY (INHALATION)
Status: CANCELLED
Start: 2023-03-25

## 2023-03-24 RX ORDER — METHYLPREDNISOLONE SODIUM SUCCINATE 125 MG/2ML
125 INJECTION, POWDER, LYOPHILIZED, FOR SOLUTION INTRAMUSCULAR; INTRAVENOUS
Status: CANCELLED
Start: 2023-03-25

## 2023-03-24 RX ADMIN — OMALIZUMAB 300 MG: 150 INJECTION, SOLUTION SUBCUTANEOUS at 11:00

## 2023-03-24 ASSESSMENT — PAIN SCALES - GENERAL: PAINLEVEL: MILD PAIN (2)

## 2023-03-24 NOTE — TELEPHONE ENCOUNTER
Shanda Kearns Eamonn, MD; Armando De Dios  Cc: [facility]   [facility] Dr. Cagle and Armando,     We have received notification that the patient's insurance company is requiring a transition to home infusion for future Xolair therapy unless there is a clinical reason why the patient must remain at one of the outpatient hospital centers.  They have asked that this transition happens in the next 60 days.       If you wish to have the patient remain at the infusion center we will need a Letter of Medical Necessity to submit along with an appeal. This will take 10-14 days once submitted.

## 2023-03-27 ENCOUNTER — TELEPHONE (OUTPATIENT)
Dept: DERMATOLOGY | Facility: CLINIC | Age: 63
End: 2023-03-27
Payer: COMMERCIAL

## 2023-03-27 NOTE — TELEPHONE ENCOUNTER
Ubaldo Cagle MD [place], [place]  I just put in an order for the pre filled syringe, lets try to get him home therapy

## 2023-03-27 NOTE — TELEPHONE ENCOUNTER
Prior Authorization Approval    Authorization Effective Date: 2/25/2023  Authorization Expiration Date: 7/25/2023  Medication: Xolair- PA Approved  Approved Dose/Quantity: 150mg/ml  Reference #: Key: [id number]   Insurance Company: [employer] - Phone [phone] Fax [phone]  Expected CoPay:       CoPay Card Available:      Foundation Assistance Needed:    Which Pharmacy is filling the prescription (Not needed for infusion/clinic administered): [facility] [address]  Pharmacy Notified: Yes  Patient Notified: Yes

## 2023-03-27 NOTE — TELEPHONE ENCOUNTER
PA Initiation    Medication: Xolair- PA Pending  Insurance Company: MED[employer] - Phone [phone] Fax [phone]  Pharmacy Filling the Rx: [facility] - [address] [place] [address]  Filling Pharmacy Phone:    Filling Pharmacy Fax:    Start Date: 3/27/2023      Key: [id number]

## 2023-04-21 ENCOUNTER — INFUSION THERAPY VISIT (OUTPATIENT)
Dept: INFUSION THERAPY | Facility: CLINIC | Age: 63
End: 2023-04-21
Attending: STUDENT IN AN ORGANIZED HEALTH CARE EDUCATION/TRAINING PROGRAM
Payer: COMMERCIAL

## 2023-04-21 VITALS
SYSTOLIC BLOOD PRESSURE: 133 MMHG | RESPIRATION RATE: 16 BRPM | HEART RATE: 54 BPM | TEMPERATURE: 97.9 F | OXYGEN SATURATION: 97 % | DIASTOLIC BLOOD PRESSURE: 84 MMHG

## 2023-04-21 DIAGNOSIS — L50.1 CHRONIC IDIOPATHIC URTICARIA: Primary | ICD-10-CM

## 2023-04-21 PROCEDURE — 250N000011 HC RX IP 250 OP 636: Performed by: STUDENT IN AN ORGANIZED HEALTH CARE EDUCATION/TRAINING PROGRAM

## 2023-04-21 PROCEDURE — 96372 THER/PROPH/DIAG INJ SC/IM: CPT | Performed by: STUDENT IN AN ORGANIZED HEALTH CARE EDUCATION/TRAINING PROGRAM

## 2023-04-21 RX ORDER — METHYLPREDNISOLONE SODIUM SUCCINATE 125 MG/2ML
125 INJECTION, POWDER, LYOPHILIZED, FOR SOLUTION INTRAMUSCULAR; INTRAVENOUS
Start: 2023-04-22

## 2023-04-21 RX ORDER — MEPERIDINE HYDROCHLORIDE 25 MG/ML
25 INJECTION INTRAMUSCULAR; INTRAVENOUS; SUBCUTANEOUS EVERY 30 MIN PRN
OUTPATIENT
Start: 2023-04-22

## 2023-04-21 RX ORDER — ALBUTEROL SULFATE 0.83 MG/ML
2.5 SOLUTION RESPIRATORY (INHALATION)
OUTPATIENT
Start: 2023-04-22

## 2023-04-21 RX ORDER — ALBUTEROL SULFATE 90 UG/1
1-2 AEROSOL, METERED RESPIRATORY (INHALATION)
Start: 2023-04-22

## 2023-04-21 RX ORDER — EPINEPHRINE 1 MG/ML
0.3 INJECTION, SOLUTION INTRAMUSCULAR; SUBCUTANEOUS EVERY 5 MIN PRN
OUTPATIENT
Start: 2023-04-22

## 2023-04-21 RX ORDER — DIPHENHYDRAMINE HYDROCHLORIDE 50 MG/ML
50 INJECTION INTRAMUSCULAR; INTRAVENOUS
Start: 2023-04-22

## 2023-04-21 RX ADMIN — OMALIZUMAB 300 MG: 150 INJECTION, SOLUTION SUBCUTANEOUS at 10:17

## 2023-04-21 NOTE — PROGRESS NOTES
Nursing Note  Ren Yung [facility] for:   Chief Complaint   Patient presents with     Imm/Inj     omalizumab (XOLAIR)     ~~~ NOTE: If the patient answers yes to any of the questions below, hold the infusion and contact ordering provider or on-call provider.    1. Have you recently had an elevated temperature, fever, chills, productive cough, coughing for 3 weeks or longer or hemoptysis,  abnormal vital signs, night sweats,  chest pain or have you noticed a decrease in your appetite, unexplained weight loss or fatigue? No  2. Do you have any open wounds or new incisions? No  3. Do you have any upcoming hospitalizations or surgeries? Does not include esophagogastroduodenoscopy, colonoscopy, endoscopic retrograde cholangiopancreatography (ERCP), endoscopic ultrasound (EUS), dental procedures or joint aspiration/steroid injections No  4. Do you currently have any signs of illness or infection or are you on any antibiotics? No  5. Have you had any new, sudden or worsening abdominal pain? No  6. Have you or anyone in your household received a live vaccination in the past 4 weeks? Please note: No live vaccines while on biologic/chemotherapy until 6 months after the last treatment. Patient can receive the flu vaccine (shot only), pneumovax and the Covid vaccine. It is optimal for the patient to get these vaccines mid cycle, but they can be given at any time as long as it is not on the day of the infusion. No  7. Have you recently been diagnosed with any new nervous system diseases (ie. Multiple sclerosis, Guillain Wilmot, seizures, neurological changes) or cancer diagnosis? Are you on any form of radiation or chemotherapy? No  8. Are you pregnant or breast feeding or do you have plans of pregnancy in the future n/a  9. Have you been having any signs of worsening depression or suicidal ideations?  (benlysta only)n/a  10. Have there been any other new onset medical  symptoms? No  11. Have you had any new blood clots? (IVIG only) No      Patient presents to the [facility] for Xolair injections 2st dose.  Order written by Dr. Cagle was completed today. Name and  verified with patient. See MAR for medication details. Medication was divided into 2 syringes by pharmacy and given in the following sites back sides of bilateral upper arms. Patient tolerated injections well and was monitored for 2 hours after administration. Patient was discharged to home. Patient to return back in 4 weeks.       Mallory Whalen RN    Administrations This Visit     omalizumab (XOLAIR) injection 300 mg     Admin Date  2023 Action  $Given Dose  300 mg Route  Subcutaneous Administered By  Mallory Whalen RN                BP (!) 145/89   Pulse 57   Temp 97.9  F (36.6  C) (Oral)   Resp 16   SpO2 97%

## 2023-05-17 ENCOUNTER — TELEPHONE (OUTPATIENT)
Dept: DERMATOLOGY | Facility: CLINIC | Age: 63
End: 2023-05-17
Payer: COMMERCIAL

## 2023-05-17 NOTE — TELEPHONE ENCOUNTER
[facility] Call Center    Phone Message    May a detailed message be left on voicemail: yes     Reason for Call: Other: Pt states he was told if he sent a message directly to Dr. Cagle he would be worked in sooner for mastocytosis.      First available is in October. Pt states he was supposed to schedule for the end of May.     Please call Pt back to discuss. Thank you.     Action Taken: Message routed to:  [facility] Derm    Travel Screening: Not Applicable

## 2023-05-19 ENCOUNTER — INFUSION THERAPY VISIT (OUTPATIENT)
Dept: INFUSION THERAPY | Facility: CLINIC | Age: 63
End: 2023-05-19
Attending: STUDENT IN AN ORGANIZED HEALTH CARE EDUCATION/TRAINING PROGRAM
Payer: COMMERCIAL

## 2023-05-19 VITALS
HEART RATE: 59 BPM | RESPIRATION RATE: 18 BRPM | OXYGEN SATURATION: 97 % | DIASTOLIC BLOOD PRESSURE: 96 MMHG | SYSTOLIC BLOOD PRESSURE: 130 MMHG | TEMPERATURE: 98 F

## 2023-05-19 DIAGNOSIS — L50.1 CHRONIC IDIOPATHIC URTICARIA: Primary | ICD-10-CM

## 2023-05-19 PROCEDURE — 250N000011 HC RX IP 250 OP 636: Performed by: STUDENT IN AN ORGANIZED HEALTH CARE EDUCATION/TRAINING PROGRAM

## 2023-05-19 PROCEDURE — 96372 THER/PROPH/DIAG INJ SC/IM: CPT | Performed by: STUDENT IN AN ORGANIZED HEALTH CARE EDUCATION/TRAINING PROGRAM

## 2023-05-19 RX ORDER — METHYLPREDNISOLONE SODIUM SUCCINATE 125 MG/2ML
125 INJECTION, POWDER, LYOPHILIZED, FOR SOLUTION INTRAMUSCULAR; INTRAVENOUS
Status: CANCELLED
Start: 2023-05-20

## 2023-05-19 RX ORDER — ALBUTEROL SULFATE 90 UG/1
1-2 AEROSOL, METERED RESPIRATORY (INHALATION)
Status: CANCELLED
Start: 2023-05-20

## 2023-05-19 RX ORDER — ALBUTEROL SULFATE 0.83 MG/ML
2.5 SOLUTION RESPIRATORY (INHALATION)
Status: CANCELLED | OUTPATIENT
Start: 2023-05-20

## 2023-05-19 RX ORDER — MEPERIDINE HYDROCHLORIDE 25 MG/ML
25 INJECTION INTRAMUSCULAR; INTRAVENOUS; SUBCUTANEOUS EVERY 30 MIN PRN
Status: CANCELLED | OUTPATIENT
Start: 2023-05-20

## 2023-05-19 RX ORDER — DIPHENHYDRAMINE HYDROCHLORIDE 50 MG/ML
50 INJECTION INTRAMUSCULAR; INTRAVENOUS
Status: CANCELLED
Start: 2023-05-20

## 2023-05-19 RX ORDER — EPINEPHRINE 1 MG/ML
0.3 INJECTION, SOLUTION INTRAMUSCULAR; SUBCUTANEOUS EVERY 5 MIN PRN
Status: CANCELLED | OUTPATIENT
Start: 2023-05-20

## 2023-05-19 RX ADMIN — OMALIZUMAB 300 MG: 150 INJECTION, SOLUTION SUBCUTANEOUS at 07:50

## 2023-05-19 NOTE — PROGRESS NOTES
Nursing Note  Ren Yung presents [facility] for:   Chief Complaint   Patient presents with     Imm/Inj     Xolair       During today's [facility] appointment, orders from Dr. Cagle were completed.  Frequency: every 28 days    Progress note:  Patient identification verified by name and date of birth.  Assessment completed.  Vitals recorded in Doc Flowsheets.  Patient was provided with education regarding medication/procedure and possible side effects.  Patient verbalized understanding.     present during visit today: Not Applicable.    Treatment Conditions: ~~~ NOTE: If the patient answers yes to any of the questions below, hold the infusion and contact ordering provider or on-call provider.    1. Have you recently had an elevated temperature, fever, chills, productive cough, coughing for 3 weeks or longer or hemoptysis, abnormal vital signs, night sweats,  chest pain or have you noticed a decrease in your appetite, unexplained weight loss or fatigue? No  2. Do you have any open wounds or new incisions? No  3. Do you have any recent or upcoming hospitalizations, surgeries or dental procedures? No  4. Do you currently have or recently have had any signs of illness or infection or are you on any antibiotics? No  5. Have you had any new, sudden or worsening abdominal pain? No  6. Have you or anyone in your household received a live vaccination in the past 4 weeks? Please note:  No live vaccines while on biologic/chemotherapy until 6 months after the last treatment.  Patient can receive the flu vaccine (shot only) and the pneumovax.  It is optimal for the patient to get these vaccines mid cycle, but they can be given at any time as long as it is not on the day of the infusion. No  7. Have you recently been diagnosed with any new nervous system diseases (ie. Multiple sclerosis, Guillain Whitman, seizures, neurological changes) or cancer  diagnosis? No  8. Are you on any form of radiation or chemotherapy? No  9. Are you pregnant or breast feeding or do you have plans of pregnancy in the future? No  10. Have you been having any signs of worsening depression or suicidal ideations?  (benlysta only) No  11. Have there been any other new onset medical symptoms? No    Is the next appt scheduled? Transitioning to home care. [facility] called and patient not in their system. Spoke to [facility] intake and they will work on getting patient set up.    Post Infusion Assessment:  Patient tolerated injection without incident.     Discharge Plan:   Follow up plan of care with: ordering provider as scheduled.  Discharge instructions were reviewed with patient.  Patient/representative verbalized understanding of discharge instructions and all questions answered.  [facility] discharged [facility] in stable condition.    Lynne Lane RN       Administrations This Visit     omalizumab (XOLAIR) injection 300 mg     Admin Date  05/19/2023 Action  $Given Dose  300 mg Route  Subcutaneous Administered By  Lynne Lane, RN                /81 (BP Location: Left arm, Patient Position: Sitting, Cuff Size: Adult Large)   Pulse 60   Temp 98  F (36.7  C) (Oral)   Resp 18   SpO2 97%

## 2023-06-01 ENCOUNTER — OFFICE VISIT (OUTPATIENT)
Dept: DERMATOLOGY | Facility: CLINIC | Age: 63
End: 2023-06-01
Payer: COMMERCIAL

## 2023-06-01 DIAGNOSIS — D47.01 DIFFUSE CUTANEOUS MASTOCYTOSIS: ICD-10-CM

## 2023-06-01 DIAGNOSIS — L50.1 CHRONIC IDIOPATHIC URTICARIA: Primary | ICD-10-CM

## 2023-06-01 PROCEDURE — 99215 OFFICE O/P EST HI 40 MIN: CPT | Mod: 25 | Performed by: STUDENT IN AN ORGANIZED HEALTH CARE EDUCATION/TRAINING PROGRAM

## 2023-06-01 PROCEDURE — 99417 PROLNG OP E/M EACH 15 MIN: CPT | Mod: GC | Performed by: STUDENT IN AN ORGANIZED HEALTH CARE EDUCATION/TRAINING PROGRAM

## 2023-06-01 RX ORDER — PREDNISONE 20 MG/1
40 TABLET ORAL DAILY
Qty: 60 TABLET | Refills: 0 | Status: SHIPPED | OUTPATIENT
Start: 2023-06-01 | End: 2024-07-17

## 2023-06-01 ASSESSMENT — PAIN SCALES - GENERAL: PAINLEVEL: MODERATE PAIN (4)

## 2023-06-01 NOTE — PROGRESS NOTES
[facility] Dermatology Note  Encounter Date: Jun 1, 2023  Date of Last Dermatology Office Visit: 3/9/2023       Dermatology Problem List:  #. Mastocytosis, indolent  #. Chronic idiopathic urticaria  - S/p biopsy 8/11/2020  - Current treatment: cromolyn sodium, montelukast, Singulair, cetirizine, topical Benadryl, Xolair monthly, nbUVB at home, Prednisone 40mg x 5 days for burning sensation   - Prior tx: prednisone taper, NBUVB (could not continue due to pain),   2. Inflamed KP vs folliculitis, last evaluated by Dr. Rogers on 9/15/2015.    - prior tx: urea cream, diflucan     ____________________________________________    Assessment & Plan:     #. Cutaneous and systemic mastocytosis  #. Chronic idiopathic urticaria  With failure of several first-line therapies to date, to include antihistamines, home light therapy, topical antihistamines, and cromolyn sodium despite compliance with these therapies.  - Continue monthly Xolair, refilled today   - Continue topical benadryl, systemic cetirizine.   - Continue home light therapy as tolerated three times a week   - Recommend using Prednisone 40mg x 5 days with symptoms of burning with light therapy   - Continue cromolyn sodium.   - Return to clinic in 2 months.   - UAS7 41    - Future:    -UVA1 if home nbUVB unsuccessful   -Discuss with hemeonc: light therapy is not tolerable, Midostaurin and  other tyrosine kinase inhibitors to consider    -Dupxient - can consider due to diffuse pruritus     Atopic dermatitis (dx addended 07/24/23)  - itchy plaques  - start dupixent      Procedures Performed:  None    Follow-up: 2 month(s) in-person, or earlier for new or changing lesions    Staff and Resident:     Staff: Dr. Ubaldo Jamil MD  Dermatology Resident, PGY-2  ____________________________________________    CC: No chief complaint on file.      HPI:  Mr. Ren Yung is a(n) 62 year old male who presents today as a as a return patient  for diffuse mastocytosis.     No chief complaint on file.    The patient is currently on Xolair monthly, Zyrtec, Fexofenadine, and Montelukasts.   -Notes little to no improvement on current regiment   -Notes that whenever he lays down or puts pressure on certain areas on his body, the areas become warm, and itchy   -Notes that right now the most concerning symptom is the itchiness.     Patient is otherwise feeling well, without additional skin concerns.    Labs Reviewed:  N/A    Physical Exam:  Vitals: There were no vitals taken for this visit.  General: Well developed adult. Resting comfortably; no acute distress. Conversational and cooperative with exam.  HEENT: Anicteric sclera. EOMI. Mucous membranes moist.   Skin Exam: A focused exam of the sun exposed skin on the trunk and extremities was performed.   - Russo Type II: Sun-occluded skin with minimal background pigmentation. Slightly tanned in sun-exposed areas..   - Diffusely distributed, innumerable coalescing 2-3mm red-violaceus macules on legs, arms and trunk  - No other lesions of concern on areas examined.   Psych: Appropriate mood and affect for situation.    Medications:  Current Outpatient Medications   Medication    cetirizine (ZYRTEC) 10 MG tablet    cromolyn (GASTROCROM) 100 MG/5ML (HIGH CONC) solution    EPINEPHrine (ANY BX GENERIC EQUIV) 0.3 MG/0.3ML injection 2-pack    famotidine (PEPCID) 20 MG tablet    montelukast (SINGULAIR) 10 MG tablet    omalizumab (XOLAIR) 150 MG/ML SOSY injection     No current facility-administered medications for this visit.        Past Medical History:   Patient Active Problem List   Diagnosis    Rash    Indolent systemic mastocytosis    Mastocytosis    Chronic idiopathic urticaria     No past medical history on file.    CC No referring provider defined for this encounter. on close of this encounter.

## 2023-06-01 NOTE — NURSING NOTE
Dermatology Rooming Note    Ren Yung's goals for this visit include:   Chief Complaint   Patient presents with     Derm Problem     Patient reports some worsening since their last visit. The patient reports they have not yet seen improvement with xolair injections.      Ysabel Guerrero LPN

## 2023-06-01 NOTE — LETTER
6/1/2023       RE: Ren Yung  [address]  [facility] [address]     Dear Colleague,    Thank you for referring your patient, Ren Yung, to the [facility] DERMATOLOGY CLINIC [place] at [facility]. Please see a copy of my visit note below.    [facility] Dermatology Note  Encounter Date: Jun 1, 2023  Date of Last Dermatology Office Visit: 3/9/2023       Dermatology Problem List:  #. Mastocytosis, indolent  #. Chronic idiopathic urticaria  - S/p biopsy 8/11/2020  - Current treatment: cromolyn sodium, montelukast, Singulair, cetirizine, topical Benadryl, Xolair monthly, nbUVB at home, Prednisone 40mg x 5 days for burning sensation   - Prior tx: prednisone taper, NBUVB (could not continue due to pain),   2. Inflamed KP vs folliculitis, last evaluated by Dr. Rogers on 9/15/2015.    - prior tx: urea cream, diflucan     ____________________________________________    Assessment & Plan:     #. Cutaneous and systemic mastocytosis  #. Chronic idiopathic urticaria  With failure of several first-line therapies to date, to include antihistamines, home light therapy, topical antihistamines, and cromolyn sodium despite compliance with these therapies.  - Continue monthly Xolair, refilled today   - Continue topical benadryl, systemic cetirizine.   - Continue home light therapy as tolerated three times a week   - Recommend using Prednisone 40mg x 5 days with symptoms of burning with light therapy   - Continue cromolyn sodium.   - Return to clinic in 2 months.   - UAS7 41    - Future:    -UVA1 if home nbUVB unsuccessful   -Discuss with hemeonc: light therapy is not tolerable, Midostaurin and  other tyrosine kinase inhibitors to consider    -Dupxient - can consider due to diffuse pruritus     Procedures Performed:  None    Follow-up: 2 month(s) in-person, or earlier for new or changing lesions    Staff and Resident:     Staff: Dr. Conner  Gurjit Jamil MD  Dermatology Resident, PGY-2  ____________________________________________    CC: No chief complaint on file.      HPI:  Mr. Ren Yung is a(n) 62 year old male who presents today as a as a return patient for diffuse mastocytosis.     No chief complaint on file.    The patient is currently on Xolair monthly, Zyrtec, Fexofenadine, and Montelukasts.   -Notes little to no improvement on current regiment   -Notes that whenever he lays down or puts pressure on certain areas on his body, the areas become warm, and itchy   -Notes that right now the most concerning symptom is the itchiness.     Patient is otherwise feeling well, without additional skin concerns.    Labs Reviewed:  N/A    Physical Exam:  Vitals: There were no vitals taken for this visit.  General: Well developed adult. Resting comfortably; no acute distress. Conversational and cooperative with exam.  HEENT: Anicteric sclera. EOMI. Mucous membranes moist.   Skin Exam: A focused exam of the sun exposed skin on the trunk and extremities was performed.   - Russo Type II: Sun-occluded skin with minimal background pigmentation. Slightly tanned in sun-exposed areas..   - Diffusely distributed, innumerable coalescing 2-3mm red-violaceus macules on legs, arms and trunk  - No other lesions of concern on areas examined.   Psych: Appropriate mood and affect for situation.    Medications:  Current Outpatient Medications   Medication    cetirizine (ZYRTEC) 10 MG tablet    cromolyn (GASTROCROM) 100 MG/5ML (HIGH CONC) solution    EPINEPHrine (ANY BX GENERIC EQUIV) 0.3 MG/0.3ML injection 2-pack    famotidine (PEPCID) 20 MG tablet    montelukast (SINGULAIR) 10 MG tablet    omalizumab (XOLAIR) 150 MG/ML SOSY injection     No current facility-administered medications for this visit.        Past Medical History:   Patient Active Problem List   Diagnosis    Rash    Indolent systemic mastocytosis    Mastocytosis    Chronic idiopathic  urticaria     No past medical history on file.    CC No referring provider defined for this encounter. on close of this encounter.      Attestation signed by Ubaldo Cagle MD at 6/1/2023  4:56 PM:  I have personally examined this patient and agree with the resident doctor's documentation and plan of care. I have reviewed and amended the resident's note. The documentation accurately reflects my clinical observations, diagnoses, treatment and follow-up plans. Provider Time: Established 83 minutes spent on the date of the encounter doing chart review, patient visit (including history, exam, and counseling), and documentation, excluding any procedures performed.       Ubaldo Cagle MD  Dermatology Staff

## 2023-06-06 ENCOUNTER — MYC MEDICAL ADVICE (OUTPATIENT)
Dept: ONCOLOGY | Facility: CLINIC | Age: 63
End: 2023-06-06
Payer: COMMERCIAL

## 2023-06-06 DIAGNOSIS — D47.02 INDOLENT SYSTEMIC MASTOCYTOSIS: ICD-10-CM

## 2023-06-06 RX ORDER — CROMOLYN SODIUM 100 MG/5ML
SOLUTION, CONCENTRATE ORAL
Qty: 1200 ML | Refills: 3 | Status: SHIPPED | OUTPATIENT
Start: 2023-06-06 | End: 2024-07-09

## 2023-06-06 NOTE — TELEPHONE ENCOUNTER
"Cromolyn 100mg/5mL solution  Last prescribing provider: 10/18/22 Dr. Sharpe    Last clinic visit date: 2/23/23     Recommendations for requested medication (if none, N/A): 2/23/23, \"Continue cromolyn  cetirizine, famotidine, fexofendine, vitamin D.\"    Any other pertinent information (if none, N/A): NA    Refilled: Y/N, if NO, why?    Routed to Dr. Yadira Sharpe.   "

## 2023-06-15 ENCOUNTER — TELEPHONE (OUTPATIENT)
Dept: DERMATOLOGY | Facility: CLINIC | Age: 63
End: 2023-06-15
Payer: COMMERCIAL

## 2023-06-15 NOTE — TELEPHONE ENCOUNTER
----- Message from Jaylene Rodriguez sent at 6/14/2023  4:46 PM CDT -----  Regarding: FW: XOLAIR  Hi Eliana,    Would you please remove the infusion appointment request line from the therapy plan?    Thank you,  Jaylene BRICEÑO [facility] - 2nd [facility]  [facility] Scheduling  [phone] (option 3, then option 2)    ----- Message -----  From: Pao Lagos  Sent: 6/14/2023   4:45 PM CDT  To: Jaylene Rodriguez; [facility]  Subject: RE: XOLAIR                                       Hi  Yes this patient is on service with [facility] for Xolair.     Thanks  ----- Message -----  From: Jaylene Rodriguez  Sent: 6/14/2023   4:43 PM CDT  To: [facility]  Subject: XOLAIR                                           Hello,    Can you please confirm if Ren is doing home infusions of Xolair?    Thank you,  Jaylene BRICEÑO [facility] - 2nd [facility]  [facility] Scheduling  [phone] (option 3, then option 2)

## 2023-07-24 ENCOUNTER — TELEPHONE (OUTPATIENT)
Dept: DERMATOLOGY | Facility: CLINIC | Age: 63
End: 2023-07-24

## 2023-07-24 DIAGNOSIS — L29.9 PRURITUS: Primary | ICD-10-CM

## 2023-07-24 DIAGNOSIS — L20.89 OTHER ATOPIC DERMATITIS: Primary | ICD-10-CM

## 2023-07-24 RX ORDER — HYDROXYZINE PAMOATE 50 MG/1
50 CAPSULE ORAL 3 TIMES DAILY PRN
Qty: 90 CAPSULE | Refills: 1 | Status: SHIPPED | OUTPATIENT
Start: 2023-07-24

## 2023-07-24 NOTE — TELEPHONE ENCOUNTER
PA Initiation    Medication: DUPIXENT 300 MG/2ML SC SOPN  Insurance Company: [employer] - Phone [phone] Fax [phone]  Pharmacy Filling the Rx:    Filling Pharmacy Phone:    Filling Pharmacy Fax:    Start Date: 7/24/2023    Key: [id number]

## 2023-07-24 NOTE — TELEPHONE ENCOUNTER
Prior Authorization Approval    Medication: DUPIXENT 300 MG/2ML SC SOPN  Authorization Effective Date: 6/24/2023  Authorization Expiration Date: 11/21/2023  Approved Dose/Quantity: 6ml per 28 days  Reference #: Key: [id number]   Insurance Company: MEDICA - Phone [phone] Fax [phone]  Expected CoPay: Unavailable, [facility] not eligible     CoPay Card Available: Yes    Financial Assistance Needed: no  Which Pharmacy is filling the prescription: [facility] [address]  Pharmacy Notified: Yes  Patient Notified: Yes

## 2023-07-25 DIAGNOSIS — D47.02 INDOLENT SYSTEMIC MASTOCYTOSIS: Primary | ICD-10-CM

## 2023-08-01 ENCOUNTER — LAB (OUTPATIENT)
Dept: LAB | Facility: CLINIC | Age: 63
End: 2023-08-01
Payer: COMMERCIAL

## 2023-08-01 ENCOUNTER — OFFICE VISIT (OUTPATIENT)
Dept: DERMATOLOGY | Facility: CLINIC | Age: 63
End: 2023-08-01
Payer: COMMERCIAL

## 2023-08-01 DIAGNOSIS — D47.02 INDOLENT SYSTEMIC MASTOCYTOSIS: ICD-10-CM

## 2023-08-01 DIAGNOSIS — D47.02 INDOLENT SYSTEMIC MASTOCYTOSIS: Primary | ICD-10-CM

## 2023-08-01 LAB
ALBUMIN SERPL BCG-MCNC: 4.4 G/DL (ref 3.5–5.2)
ALP SERPL-CCNC: 85 U/L (ref 40–129)
ALT SERPL W P-5'-P-CCNC: 16 U/L (ref 0–70)
ANION GAP SERPL CALCULATED.3IONS-SCNC: 7 MMOL/L (ref 7–15)
AST SERPL W P-5'-P-CCNC: 14 U/L (ref 0–45)
BASOPHILS # BLD AUTO: 0 10E3/UL (ref 0–0.2)
BASOPHILS NFR BLD AUTO: 0 %
BILIRUB SERPL-MCNC: 0.5 MG/DL
BUN SERPL-MCNC: 17.5 MG/DL (ref 8–23)
CALCIUM SERPL-MCNC: 9.4 MG/DL (ref 8.8–10.2)
CHLORIDE SERPL-SCNC: 104 MMOL/L (ref 98–107)
CREAT SERPL-MCNC: 0.89 MG/DL (ref 0.67–1.17)
DEPRECATED HCO3 PLAS-SCNC: 28 MMOL/L (ref 22–29)
EOSINOPHIL # BLD AUTO: 0.1 10E3/UL (ref 0–0.7)
EOSINOPHIL NFR BLD AUTO: 1 %
ERYTHROCYTE [DISTWIDTH] IN BLOOD BY AUTOMATED COUNT: 13.1 % (ref 10–15)
GFR SERPL CREATININE-BSD FRML MDRD: >90 ML/MIN/1.73M2
GLUCOSE SERPL-MCNC: 123 MG/DL (ref 70–99)
HCT VFR BLD AUTO: 42.3 % (ref 40–53)
HGB BLD-MCNC: 13.7 G/DL (ref 13.3–17.7)
IMM GRANULOCYTES # BLD: 0 10E3/UL
IMM GRANULOCYTES NFR BLD: 0 %
LDH SERPL L TO P-CCNC: 110 U/L (ref 0–250)
LYMPHOCYTES # BLD AUTO: 2.4 10E3/UL (ref 0.8–5.3)
LYMPHOCYTES NFR BLD AUTO: 32 %
MCH RBC QN AUTO: 29.7 PG (ref 26.5–33)
MCHC RBC AUTO-ENTMCNC: 32.4 G/DL (ref 31.5–36.5)
MCV RBC AUTO: 92 FL (ref 78–100)
MONOCYTES # BLD AUTO: 0.4 10E3/UL (ref 0–1.3)
MONOCYTES NFR BLD AUTO: 5 %
NEUTROPHILS # BLD AUTO: 4.7 10E3/UL (ref 1.6–8.3)
NEUTROPHILS NFR BLD AUTO: 62 %
NRBC # BLD AUTO: 0 10E3/UL
NRBC BLD AUTO-RTO: 0 /100
PLATELET # BLD AUTO: 270 10E3/UL (ref 150–450)
POTASSIUM SERPL-SCNC: 4.8 MMOL/L (ref 3.4–5.3)
PROT SERPL-MCNC: 7.1 G/DL (ref 6.4–8.3)
RBC # BLD AUTO: 4.61 10E6/UL (ref 4.4–5.9)
SODIUM SERPL-SCNC: 139 MMOL/L (ref 136–145)
WBC # BLD AUTO: 7.6 10E3/UL (ref 4–11)

## 2023-08-01 PROCEDURE — 36415 COLL VENOUS BLD VENIPUNCTURE: CPT | Performed by: PATHOLOGY

## 2023-08-01 PROCEDURE — 83615 LACTATE (LD) (LDH) ENZYME: CPT | Performed by: PATHOLOGY

## 2023-08-01 PROCEDURE — 99000 SPECIMEN HANDLING OFFICE-LAB: CPT | Performed by: PATHOLOGY

## 2023-08-01 PROCEDURE — 83520 IMMUNOASSAY QUANT NOS NONAB: CPT | Performed by: INTERNAL MEDICINE

## 2023-08-01 PROCEDURE — 80053 COMPREHEN METABOLIC PANEL: CPT | Performed by: PATHOLOGY

## 2023-08-01 PROCEDURE — 99214 OFFICE O/P EST MOD 30 MIN: CPT | Performed by: STUDENT IN AN ORGANIZED HEALTH CARE EDUCATION/TRAINING PROGRAM

## 2023-08-01 PROCEDURE — 85025 COMPLETE CBC W/AUTO DIFF WBC: CPT | Performed by: PATHOLOGY

## 2023-08-01 NOTE — LETTER
8/1/2023       RE: Ren Yung  [address]  [facility] [address]     Dear Colleague,    Thank you for referring your patient, Ren Yung, to the [facility] DERMATOLOGY CLINIC [place] at [facility]. Please see a copy of my visit note below.    Dermatology Rooming Note    Ren Yung's goals for this visit include:   Chief Complaint   Patient presents with    Derm Problem     2 month follow up, pt states it has been progressively worse with symptoms increasing in frequency.      Armando De Dios, EMT-B      I have personally examined this patient and agree with the medical student's documentation and plan of care. I have reviewed and amended the medical student's note as necessary.The documentation accurately reflects my clinical observations, diagnoses, treatment and follow-up plans.     Ubaldo Cagle MD  Dermatology Staff      [facility] Dermatology Note  Encounter Date: Aug 1, 2023  Office Visit     Dermatology Problem List:  #. Mastocytosis, indolent  #. Chronic idiopathic urticaria  - S/p biopsy 8/11/2020  - Current treatment: cromolyn sodium, montelukast, Singulair, cetirizine, topical lidocaine,   - Prior tx: prednisone taper, NBUVB (could not continue due to pain), Xolair, nbUVB at home, Prednisone 40mg x 5 days for burning sensation    # Inflamed KP vs folliculitis, last evaluated by Dr. Rogers on 9/15/2015.    - prior tx: urea cream, diflucan      ____________________________________________     Assessment & Plan:   #. Cutaneous and systemic mastocytosis  #. Chronic idiopathic urticaria  With failure of several first-line therapies to date, to include antihistamines, home light therapy, topical antihistamines, Xolair and cromolyn sodium despite compliance with these therapies.  - Continue topical lidocaine prn, systemic cetirizine.   - Episodes of lightheadedness possibly due to histamine response causing  vasodilation. Recommend increasing salt/electrolyte intake during these episodes, lying down, elevating legs. Histamine reactions often short lived. Go to ER if passing out or difficulty breathing  - Continue cromolyn sodium.   - Start Dupixent. Approved, waiting on pharmacy  - Heme/onc working on getting newly approved PDGFRA TKI avapritinib approved  - Future:   -Consider starting avapritinib, a kinase inhibitor, with Dr. Sharpe in oncology              -UVA1 if home nbUVB unsuccessful                  Procedures Performed:   None    Follow-up: 2 month(s) in-person, or earlier for new or changing lesions    Staff and Medical Student:   Lana Ugalde, MS3, seen and staffed with Dr. Cagle      ____________________________________________    CC: Derm Problem (2 month follow up, pt states it has been progressively worse with symptoms increasing in frequency. )    HPI:  Mr. eRn Yung is a(n) 62 year old male who presents today as a return patient for chronic urticaria. Ren notes worsening overall pruritus and rash. He has not started Dupixent as he is waiting on the pharmacy to fill the prescription. Rash is primarily distributed to his chest, trunk, back, upper legs. Feels it is spreading outward. Continues to have multiple flare-ups daily.    Trial of light therapy at home with prednisone 40 mg x 5 day seemed to worsen symptoms. Using topical lidocaine prn rather than topical benadryl. Continues taking cetirizine twice daily and cromolyn sodium 4 times per day. Purchased a water ciruclator for his bed that provides some cooling relief. Last Xolair injection was 3 wks ago.     Reports a couple episodes of lightheadedness in the past week, seemed to be triggered by heat. Also notes some associated mild wheezing, this does not affect his daily living.      Patient is otherwise feeling well, without additional skin concerns.    Labs:  None    Physical Exam:  Vitals: There were no vitals taken for this  visit.  SKIN: Waist-up skin, which includes the head/face, neck, both arms, chest, back, abdomen, digits and/or nails was examined.  - Russo Type II: Sun-occluded skin with minimal background pigmentation. Slightly tanned in sun-exposed areas..   - Diffusely distributed, innumerable coalescing 2-3mm red-violaceus macules on legs, arms and trunk. Coalesced macules with raised wheals on left trunk.  - No other lesions of concern on areas examined.     Medications:  Current Outpatient Medications   Medication    cetirizine (ZYRTEC) 10 MG tablet    cromolyn (GASTROCROM) 100 MG/5ML (HIGH CONC) solution    EPINEPHrine (ANY BX GENERIC EQUIV) 0.3 MG/0.3ML injection 2-pack    famotidine (PEPCID) 20 MG tablet    hydrOXYzine (VISTARIL) 50 MG capsule    montelukast (SINGULAIR) 10 MG tablet    omalizumab (XOLAIR) 150 MG/ML SOSY injection    dupilumab (DUPIXENT) 300 MG/2ML prefilled pen    dupilumab (DUPIXENT) 300 MG/2ML prefilled pen    predniSONE (DELTASONE) 20 MG tablet     No current facility-administered medications for this visit.      Past Medical History:   Patient Active Problem List   Diagnosis    Rash    Indolent systemic mastocytosis    Mastocytosis    Chronic idiopathic urticaria     History reviewed. No pertinent past medical history.     CC No referring provider defined for this encounter. on close of this encounter.

## 2023-08-01 NOTE — PROGRESS NOTES
I have personally examined this patient and agree with the medical student's documentation and plan of care. I have reviewed and amended the medical student's note as necessary.The documentation accurately reflects my clinical observations, diagnoses, treatment and follow-up plans.     Ubaldo Cagle MD  Dermatology Staff      [facility] Dermatology Note  Encounter Date: Aug 1, 2023  Office Visit     Dermatology Problem List:  #. Mastocytosis, indolent  #. Chronic idiopathic urticaria  - S/p biopsy 8/11/2020  - Current treatment: cromolyn sodium, montelukast, Singulair, cetirizine, topical lidocaine,   - Prior tx: prednisone taper, NBUVB (could not continue due to pain), Xolair, nbUVB at home, Prednisone 40mg x 5 days for burning sensation    # Inflamed KP vs folliculitis, last evaluated by Dr. Rogers on 9/15/2015.    - prior tx: urea cream, diflucan      ____________________________________________     Assessment & Plan:   #. Cutaneous and systemic mastocytosis  #. Chronic idiopathic urticaria  With failure of several first-line therapies to date, to include antihistamines, home light therapy, topical antihistamines, Xolair and cromolyn sodium despite compliance with these therapies.  - Continue topical lidocaine prn, systemic cetirizine.   - Episodes of lightheadedness possibly due to histamine response causing vasodilation. Recommend increasing salt/electrolyte intake during these episodes, lying down, elevating legs. Histamine reactions often short lived. Go to ER if passing out or difficulty breathing  - Continue cromolyn sodium.   - Start Dupixent. Approved, waiting on pharmacy  - Heme/onc working on getting newly approved PDGFRA TKI avapritinib approved  - Future:   -Consider starting avapritinib, a kinase inhibitor, with Dr. Sharpe in oncology              -UVA1 if home nbUVB unsuccessful                  Procedures Performed:   None    Follow-up: 2 month(s) in-person, or earlier for new  or changing lesions    Staff and Medical Student:   Lana Ugalde, MS3, seen and staffed with Dr. Cagle      ____________________________________________    CC: Derm Problem (2 month follow up, pt states it has been progressively worse with symptoms increasing in frequency. )    HPI:  Mr. Ren Yung is a(n) 62 year old male who presents today as a return patient for chronic urticaria. Ren notes worsening overall pruritus and rash. He has not started Dupixent as he is waiting on the pharmacy to fill the prescription. Rash is primarily distributed to his chest, trunk, back, upper legs. Feels it is spreading outward. Continues to have multiple flare-ups daily.    Trial of light therapy at home with prednisone 40 mg x 5 day seemed to worsen symptoms. Using topical lidocaine prn rather than topical benadryl. Continues taking cetirizine twice daily and cromolyn sodium 4 times per day. Purchased a water ciruclator for his bed that provides some cooling relief. Last Xolair injection was 3 wks ago.     Reports a couple episodes of lightheadedness in the past week, seemed to be triggered by heat. Also notes some associated mild wheezing, this does not affect his daily living.      Patient is otherwise feeling well, without additional skin concerns.    Labs:  None    Physical Exam:  Vitals: There were no vitals taken for this visit.  SKIN: Waist-up skin, which includes the head/face, neck, both arms, chest, back, abdomen, digits and/or nails was examined.  - Russo Type II: Sun-occluded skin with minimal background pigmentation. Slightly tanned in sun-exposed areas..   - Diffusely distributed, innumerable coalescing 2-3mm red-violaceus macules on legs, arms and trunk. Coalesced macules with raised wheals on left trunk.  - No other lesions of concern on areas examined.     Medications:  Current Outpatient Medications   Medication    cetirizine (ZYRTEC) 10 MG tablet    cromolyn (GASTROCROM) 100 MG/5ML (HIGH CONC)  solution    EPINEPHrine (ANY BX GENERIC EQUIV) 0.3 MG/0.3ML injection 2-pack    famotidine (PEPCID) 20 MG tablet    hydrOXYzine (VISTARIL) 50 MG capsule    montelukast (SINGULAIR) 10 MG tablet    omalizumab (XOLAIR) 150 MG/ML SOSY injection    dupilumab (DUPIXENT) 300 MG/2ML prefilled pen    dupilumab (DUPIXENT) 300 MG/2ML prefilled pen    predniSONE (DELTASONE) 20 MG tablet     No current facility-administered medications for this visit.      Past Medical History:   Patient Active Problem List   Diagnosis    Rash    Indolent systemic mastocytosis    Mastocytosis    Chronic idiopathic urticaria     History reviewed. No pertinent past medical history.     CC No referring provider defined for this encounter. on close of this encounter.

## 2023-08-01 NOTE — NURSING NOTE
Dermatology Rooming Note    Ren Yung's goals for this visit include:   Chief Complaint   Patient presents with    Derm Problem     2 month follow up, pt states it has been progressively worse with symptoms increasing in frequency.      Armando De Dios, EMT-B

## 2023-08-04 LAB — TRYPTASE SERPL-MCNC: 152 UG/L

## 2023-08-09 ENCOUNTER — MYC MEDICAL ADVICE (OUTPATIENT)
Dept: DERMATOLOGY | Facility: CLINIC | Age: 63
End: 2023-08-09
Payer: COMMERCIAL

## 2023-08-10 NOTE — TELEPHONE ENCOUNTER
You  Ren Cohn now (4:09 PM)       Dr. Gurjit Zeng's nursing staff scheduled you for 10/10/2023 @10am with Dr. Cagle earlier today. It looks like he already spoke with you to reschedule. If you have any questions please let us know.     Moses Klein, LUKASZ    This [employer] message has not been read.     Mallory Ruiz  [facility] Dermatology Adult C[facility]4 hours ago (11:38 AM)     MB  Please see below and reach out to Pt if you can move up his Appt please      Thank you,  Mallory  Referral Specialist     If responding to this message please allow 24-48 business hours for a reply. If you need sooner assistance please call [facility] at [phone], or [facility] at [phone] between the hours of 7:00am to 5:00pm Monday-Friday.        Ren SANCHEZ Dermatology Scheduling Access Center5 hours ago (11:03 AM)       Please cancel that appointment. I will call and make one. With my condition he wants to see me in the first part of October.       Jyoti Simon6 hours ago (9:28 AM)     TG  Hello,     Thank you for your [facility] appointment request! I have scheduled you for an appointment with Ubaldo Cagle MD at their first availability. You have also been added to a wait-list in case a sooner appointment becomes available.        Dec 19, 2023  3:15 PM  (Arrive by 3:00 PM)  Return Visit with Ubaldo Cagle MD  [facility] [place] ([facility] ) [phone]         If this day or time will not work for you please let us know.     Thank youJyoti     If responding to this message, please allow 24-48 business hours for a reply.   For sooner assistance, please call between the hours of 7am-5pm Monday-Friday.  [facility] DERMATOLOGY at [phone] or   [facility] at [phone]        Ren SANCHEZ Dermatology Scheduling Access [email or web address] hours ago (8:43  PM)       Appointment Request From: Ren Yung     With Provider: Ubaldo Cagle MD [[facility]at[facility] [place]]     Preferred Date Range: Any date 10/6/2023 or later     Preferred Times: Any Time     Reason for visit: Request an Appointment     Comments:  Mastocytosis follow up

## 2023-08-10 NOTE — TELEPHONE ENCOUNTER
Per Dr. Cagle's nursing staff, they already spoke with patient and he is rescheduled to October. Closing this encounter.    Moses Klein, EMT

## 2023-09-07 DIAGNOSIS — D47.02 INDOLENT SYSTEMIC MASTOCYTOSIS: ICD-10-CM

## 2023-09-20 ENCOUNTER — ONCOLOGY VISIT (OUTPATIENT)
Dept: ONCOLOGY | Facility: CLINIC | Age: 63
End: 2023-09-20
Attending: INTERNAL MEDICINE
Payer: COMMERCIAL

## 2023-09-20 ENCOUNTER — TELEPHONE (OUTPATIENT)
Dept: ONCOLOGY | Facility: CLINIC | Age: 63
End: 2023-09-20

## 2023-09-20 VITALS
OXYGEN SATURATION: 99 % | HEART RATE: 60 BPM | BODY MASS INDEX: 34.48 KG/M2 | WEIGHT: 233.5 LBS | DIASTOLIC BLOOD PRESSURE: 85 MMHG | TEMPERATURE: 97.2 F | SYSTOLIC BLOOD PRESSURE: 138 MMHG

## 2023-09-20 DIAGNOSIS — D47.02 INDOLENT SYSTEMIC MASTOCYTOSIS: ICD-10-CM

## 2023-09-20 DIAGNOSIS — D47.01 URTICARIA PIGMENTOSA: Primary | ICD-10-CM

## 2023-09-20 PROCEDURE — 99215 OFFICE O/P EST HI 40 MIN: CPT | Performed by: INTERNAL MEDICINE

## 2023-09-20 PROCEDURE — G0463 HOSPITAL OUTPT CLINIC VISIT: HCPCS | Performed by: INTERNAL MEDICINE

## 2023-09-20 RX ORDER — MONTELUKAST SODIUM 10 MG/1
10 TABLET ORAL AT BEDTIME
Qty: 90 TABLET | Refills: 3 | Status: SHIPPED | OUTPATIENT
Start: 2023-09-20

## 2023-09-20 ASSESSMENT — PAIN SCALES - GENERAL: PAINLEVEL: NO PAIN (1)

## 2023-09-20 NOTE — PROGRESS NOTES
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     The trephine core biopsy is adequate. The marrow cellularity is estimated at 50-60%. Occasional large   aggregates of cells with granular cytoplasm are present, consistent with mast cells. Each aggregate contains   more than 15 cells, approximately half of which show spindle morphology.  and mast cell tryptase highlight mast cells, which are scattered   throughout the marrow and can be seen   forming occasional large aggregates, each containing more than 15 cells. The mast cells are weakly positive   for CD2. Together, the scattered and aggregated mast cells represent an estimated 15% of marrow cellularity.     -History of low back pain  -Status post torn meniscus of knee  -Bee sting allergy  -Varicose veins, no history of deep vein thrombosis  -Obesity, BMI 35     History of Present Illness: Mr. Yung is a 62 year old man with a history indolent systemic mastocytosis, seen in person for routine follow-up.He is accompanied by his wife.  His main problem is skin rash with pruritis. He takes cromolyn, cetirazine and famotadine, and fexofenadine (Allegra). He has been taking all of these meds, not sure if helping. He is followed closely by Dr. Prakash, dermatology. Started on Xolair (omalizumab- monoclonal Ab against IgE) and in June started on  Dupixent (dupilumab- anti IL4-R alpha).  He has also tried UVB light therapy.  None of these various treatments had helped his symptoms.  He had a short trial of steroids recently, and says that made him feel worse.  He has severe pruritus and feels like his skin is always burning.  It is especially severe after taking shower.  He is unable to sleep at night because of the itching.  He also has occasional wheezing and lightheadedness.  He feels  very fatigued.    He had COVID infection last week, mainly cough and sinus congestion and a fever.  Resolved quickly and feels pretty much back to his normal symptoms from systemic asteatosis.    He has not had any bug bites in many years.  Has an EpiPen available if needed.      PHYSICAL EXAMINATION:  /85 (BP Location: Right arm, Patient Position: Sitting, Cuff Size: Adult Large)   Pulse 60   Temp 97.2  F (36.2  C) (Oral)   Wt 105.9 kg (233 lb 8 oz)   SpO2 99%   BMI 34.48 kg/m      General appearance:  Patient is 62 year old man in no acute distress.     HEENT:  No pallor, icterus, or mucositis.  No thyromegaly.   Lymph nodes:  No cervical, supraclavicular, axillary, or inguinal lymphadenopathy.   Lungs:  Clear to auscultation bilaterally.   Heart:  Regular rate and rhythm; no S3 S4 or murmer.     Abdomen:  Positive bowel sounds, soft and nontender, nondistended.  No hepatomegaly. No splenomegaly appreciated.    Extremities:  No joint swelling or tenderness.  No ankle edema.     Skin: Diffuse erythematous macular rash most prominent on chest abdomen and back.  Some sparing of forearms hands and lower legs.  Multiple excoriation marks.  No petechiae or ecchymoses.    Labs:   Latest Reference Range & Units 08/01/23 11:48   Sodium 136 - 145 mmol/L 139   Potassium 3.4 - 5.3 mmol/L 4.8   Chloride 98 - 107 mmol/L 104   Carbon Dioxide (CO2) 22 - 29 mmol/L 28   Urea Nitrogen 8.0 - 23.0 mg/dL 17.5   Creatinine 0.67 - 1.17 mg/dL 0.89   GFR Estimate >60 mL/min/1.73m2 >90   Calcium 8.8 - 10.2 mg/dL 9.4   Anion Gap 7 - 15 mmol/L 7   Albumin 3.5 - 5.2 g/dL 4.4   Protein Total 6.4 - 8.3 g/dL 7.1   Alkaline Phosphatase 40 - 129 U/L 85   ALT 0 - 70 U/L 16   AST 0 - 45 U/L 14   Bilirubin Total <=1.2 mg/dL 0.5   Glucose 70 - 99 mg/dL 123 (H)   Lactate Dehydrogenase 0 - 250 U/L 110   Tryptase <11.0 ug/L 152.0 (H)   WBC 4.0 - 11.0 10e3/uL 7.6   Hemoglobin 13.3 - 17.7 g/dL 13.7   Hematocrit 40.0 - 53.0 % 42.3   Platelet Count  150 - 450 10e3/uL 270   RBC Count 4.40 - 5.90 10e6/uL 4.61   MCV 78 - 100 fL 92   MCH 26.5 - 33.0 pg 29.7   MCHC 31.5 - 36.5 g/dL 32.4   RDW 10.0 - 15.0 % 13.1   % Neutrophils % 62   % Lymphocytes % 32   % Monocytes % 5   % Eosinophils % 1   % Basophils % 0   Absolute Basophils 0.0 - 0.2 10e3/uL 0.0   Absolute Eosinophils 0.0 - 0.7 10e3/uL 0.1   Absolute Immature Granulocytes <=0.4 10e3/uL 0.0   Absolute Lymphocytes 0.8 - 5.3 10e3/uL 2.4   Absolute Monocytes 0.0 - 1.3 10e3/uL 0.4   % Immature Granulocytes % 0   Absolute Neutrophils 1.6 - 8.3 10e3/uL 4.7   (H): Data is abnormally high    Assessment and recommendation:     # 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 He has tried every possible combination of antihistamines, cromolyn, and specialized anti-inflammatory monoclonal antibodies with no relief.     He is a candidate for recently approved avapritinib, a  tyrosine kinase inhibitor against PDGFRA and KIT, which has been approved for indolent mastocytosis.The goal is symptom relief, and if helpful, he will stay on it indefinitely. We discussed possible toxicity- CNS, facial edema, gastrointestinal, marrow suppression. I suspect that these toxicities are much more likely to occur when given doses of 200-300 mg for aggressive mastocytosis or GIST. The dose for indolent mastocytosis is only 25 mg daily. I warned that it may take a while to get approval for the medication. [facility] oral chemotherapy pharmacists will help monitor him for improvement and toxicity. I will write a letter of medical necessity for the preauthorization process.      Start avaprtinib 25 mg daily    RTC 3 months- can do video, labs a few days ahead of time    Time: I spent a total of 60 minutes on the day of the visit. Please see the note for further information on patient assessment and treatment.     Yadira  JOSEE Sharpe MD  Hematology

## 2023-09-20 NOTE — LETTER
9/20/2023         RE: Ren Yung  [address]  [facility] [address]        Dear Colleague,    Thank you for referring your patient, Ren Yung, to the [facility]. Please see a copy of my visit note below.    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     The trephine core biopsy is adequate. The marrow cellularity is estimated at 50-60%. Occasional large   aggregates of cells with granular cytoplasm are present, consistent with mast cells. Each aggregate contains   more than 15 cells, approximately half of which show spindle morphology.  and mast cell tryptase highlight mast cells, which are scattered   throughout the marrow and can be seen   forming occasional large aggregates, each containing more than 15 cells. The mast cells are weakly positive   for CD2. Together, the scattered and aggregated mast cells represent an estimated 15% of marrow cellularity.     -History of low back pain  -Status post torn meniscus of knee  -Bee sting allergy  -Varicose veins, no history of deep vein thrombosis  -Obesity, BMI 35     History of Present Illness: Mr. Yung is a 62 year old man with a history indolent systemic mastocytosis, seen in person for routine follow-up.He is accompanied by his wife.  His main problem is skin rash with pruritis. He takes cromolyn, cetirazine and famotadine, and fexofenadine (Allegra). He has been taking all of these meds, not sure if helping. He is followed closely by Dr. Prakash, dermatology. Started on Xolair (omalizumab- monoclonal Ab against IgE) and in June started on  Dupixent (dupilumab- anti IL4-R alpha).  He has also tried UVB light therapy.  None of these various treatments had helped his symptoms.  He had a short trial of steroids recently, and  says that made him feel worse.  He has severe pruritus and feels like his skin is always burning.  It is especially severe after taking shower.  He is unable to sleep at night because of the itching.  He also has occasional wheezing and lightheadedness.  He feels very fatigued.    He had COVID infection last week, mainly cough and sinus congestion and a fever.  Resolved quickly and feels pretty much back to his normal symptoms from systemic asteatosis.    He has not had any bug bites in many years.  Has an EpiPen available if needed.      PHYSICAL EXAMINATION:  /85 (BP Location: Right arm, Patient Position: Sitting, Cuff Size: Adult Large)   Pulse 60   Temp 97.2  F (36.2  C) (Oral)   Wt 105.9 kg (233 lb 8 oz)   SpO2 99%   BMI 34.48 kg/m      General appearance:  Patient is 62 year old man in no acute distress.     HEENT:  No pallor, icterus, or mucositis.  No thyromegaly.   Lymph nodes:  No cervical, supraclavicular, axillary, or inguinal lymphadenopathy.   Lungs:  Clear to auscultation bilaterally.   Heart:  Regular rate and rhythm; no S3 S4 or murmer.     Abdomen:  Positive bowel sounds, soft and nontender, nondistended.  No hepatomegaly. No splenomegaly appreciated.    Extremities:  No joint swelling or tenderness.  No ankle edema.     Skin: Diffuse erythematous macular rash most prominent on chest abdomen and back.  Some sparing of forearms hands and lower legs.  Multiple excoriation marks.  No petechiae or ecchymoses.    Labs:   Latest Reference Range & Units 08/01/23 11:48   Sodium 136 - 145 mmol/L 139   Potassium 3.4 - 5.3 mmol/L 4.8   Chloride 98 - 107 mmol/L 104   Carbon Dioxide (CO2) 22 - 29 mmol/L 28   Urea Nitrogen 8.0 - 23.0 mg/dL 17.5   Creatinine 0.67 - 1.17 mg/dL 0.89   GFR Estimate >60 mL/min/1.73m2 >90   Calcium 8.8 - 10.2 mg/dL 9.4   Anion Gap 7 - 15 mmol/L 7   Albumin 3.5 - 5.2 g/dL 4.4   Protein Total 6.4 - 8.3 g/dL 7.1   Alkaline Phosphatase 40 - 129 U/L 85   ALT 0 - 70 U/L 16   AST  0 - 45 U/L 14   Bilirubin Total <=1.2 mg/dL 0.5   Glucose 70 - 99 mg/dL 123 (H)   Lactate Dehydrogenase 0 - 250 U/L 110   Tryptase <11.0 ug/L 152.0 (H)   WBC 4.0 - 11.0 10e3/uL 7.6   Hemoglobin 13.3 - 17.7 g/dL 13.7   Hematocrit 40.0 - 53.0 % 42.3   Platelet Count 150 - 450 10e3/uL 270   RBC Count 4.40 - 5.90 10e6/uL 4.61   MCV 78 - 100 fL 92   MCH 26.5 - 33.0 pg 29.7   MCHC 31.5 - 36.5 g/dL 32.4   RDW 10.0 - 15.0 % 13.1   % Neutrophils % 62   % Lymphocytes % 32   % Monocytes % 5   % Eosinophils % 1   % Basophils % 0   Absolute Basophils 0.0 - 0.2 10e3/uL 0.0   Absolute Eosinophils 0.0 - 0.7 10e3/uL 0.1   Absolute Immature Granulocytes <=0.4 10e3/uL 0.0   Absolute Lymphocytes 0.8 - 5.3 10e3/uL 2.4   Absolute Monocytes 0.0 - 1.3 10e3/uL 0.4   % Immature Granulocytes % 0   Absolute Neutrophils 1.6 - 8.3 10e3/uL 4.7   (H): Data is abnormally high    Assessment and recommendation:     # 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 He has tried every possible combination of antihistamines, cromolyn, and specialized anti-inflammatory monoclonal antibodies with no relief.     He is a candidate for recently approved avapritinib, a  tyrosine kinase inhibitor against PDGFRA and KIT, which has been approved for indolent mastocytosis.The goal is symptom relief, and if helpful, he will stay on it indefinitely. We discussed possible toxicity- CNS, facial edema, gastrointestinal, marrow suppression. I suspect that these toxicities are much more likely to occur when given doses of 200-300 mg for aggressive mastocytosis or GIST. The dose for indolent mastocytosis is only 25 mg daily. I warned that it may take a while to get approval for the medication. [facility] oral chemotherapy pharmacists will help monitor him for improvement and toxicity. I will write a letter of medical necessity for the  preauthorization process.      Start avaprtinib 25 mg daily    RTC 3 months- can do video, labs a few days ahead of time    Time: I spent a total of 60 minutes on the day of the visit. Please see the note for further information on patient assessment and treatment.     Yadira Sharpe MD  Hematology

## 2023-09-20 NOTE — LETTER
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 He takes cromolyn, cetirazine, famotadine, Singulair, and fexofenadine, the combination of which has not helped his symptoms.  He has been on Xolair (omalizumab) and  Dupixent (dupilumab) with no imporvement in his symptoms  He has also tried UVB light therapy, which has not helped.   None of these various treatments had helped his symptoms.  He had a short trial of steroids recently, and says that made him feel worse.  He has severe pruritus and feels like his skin is always burning.  It is especially severe after taking shower.  He is unable to sleep at night because of the itching.  He also has occasional wheezing and lightheadedness.  He feels very fatigued. He has not been given midostaurin, because he dose not meet the criteria for advanced systemic mastocytosis.     On exam, he has a diffuse, intense rash of urticaria pigmentosis. No organomegaly. No lymphadenothy.    Labs:   Latest Reference Range & Units 08/01/23 11:48   Sodium 136 - 145 mmol/L 139   Potassium 3.4 - 5.3 mmol/L 4.8   Chloride 98 - 107 mmol/L 104   Carbon Dioxide (CO2) 22 - 29 mmol/L 28   Urea Nitrogen 8.0 - 23.0 mg/dL 17.5   Creatinine 0.67 - 1.17 mg/dL 0.89   GFR Estimate >60 mL/min/1.73m2 >90   Calcium 8.8 - 10.2 mg/dL 9.4   Anion Gap 7 - 15 mmol/L 7   Albumin 3.5 - 5.2 g/dL 4.4   Protein Total 6.4 - 8.3 g/dL 7.1   Alkaline Phosphatase 40 - 129 U/L 85   ALT 0 - 70 U/L 16   AST 0 - 45 U/L 14   Bilirubin Total <=1.2 mg/dL 0.5   Glucose 70 - 99 mg/dL 123 (H)   Lactate Dehydrogenase 0 - 250 U/L 110   Tryptase <11.0 ug/L 152.0 (H)   WBC 4.0 - 11.0 10e3/uL 7.6   Hemoglobin 13.3 - 17.7 g/dL 13.7   Hematocrit  40.0 - 53.0 % 42.3   Platelet Count 150 - 450 10e3/uL 270   RBC Count 4.40 - 5.90 10e6/uL 4.61   MCV 78 - 100 fL 92   MCH 26.5 - 33.0 pg 29.7   MCHC 31.5 - 36.5 g/dL 32.4   RDW 10.0 - 15.0 % 13.1   % Neutrophils % 62   % Lymphocytes % 32   % Monocytes % 5   % Eosinophils % 1   % Basophils % 0   Absolute Basophils 0.0 - 0.2 10e3/uL 0.0   Absolute Eosinophils 0.0 - 0.7 10e3/uL 0.1   Absolute Immature Granulocytes <=0.4 10e3/uL 0.0   Absolute Lymphocytes 0.8 - 5.3 10e3/uL 2.4   Absolute Monocytes 0.0 - 1.3 10e3/uL 0.4   % Immature Granulocytes % 0   Absolute Neutrophils 1.6 - 8.3 10e3/uL 4.7   Patient Name: ALEA GUTIERREZ  MR#: [id number]  Specimen #: [id number]  Collected: 10/16/2020  Received: 10/16/2020  Reported: 10/19/2020 19:31  Ordering Phy(s): MARTINEZ SALAZAR  Additional Phy(s): Copy to Cytogenetics    For improved result formatting, select 'View Enhanced Report Format' under   Linked Documents section.    +++ORIGINAL REPORT FOLLOWS ADDENDUM+++  ADDENDUM    TO ORIGINAL REPORT  Status: Signed Out  Date Ordered:10/27/2020  Date Complete:10/27/2020  Date Reported:10/27/2020 10:43  Signed Out By: Kaitlynn Ozuna MD    INTERPRETATION:  The original diagnosis is unchanged.    COMMENTS:  This addendum is issued to integrate the results of molecular studies. 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 This finding supports the original diagnosis of   systemic mastocytosis.    __________________________________________  ADDENDUM    TO ORIGINAL REPORT  Status: Signed Out  Date Ordered:10/21/2020  Date Complete:10/21/2020  Date Reported:10/21/2020 20:39  Signed Out By: Kaitlynn Ozuna MD    INTERPRETATION:  The original interpretation is unchanged.    COMMENTS:  This addendum is issued to document the results of an additional  immunohistochemical stain performed on the  trephine core sections. CD25 is strongly positive among the scattered and  aggregated mast cells seen in the  marrow biopsy,  "supporting the original diagnosis of systemic mastocytosis.    __________________________________________    ORIGINAL REPORT:    TEST(S):  Unilateral Bone Marrow Biopsy/Aspiration    FINAL DIAGNOSIS:  Bone marrow, posterior iliac crest, left decalcified trephine biopsy and  touch imprint; left particle crush,  direct aspirate smear, and concentrated aspirate smear; and peripheral  blood smear:    - Marrow involved by systemic mastocytosis (see comment)    - Normocellular marrow (cellularity estimated at 40-50%) with trilineage   hematopoietic maturation, no  increase in blasts, and multifocal dense aggregates of mast cells    - Peripheral blood showing a normal hemogram and leukocyte differential    COMMENT:  Occasional dense aggregates of mast cells are present on the core biopsy;  the mast cells in these aggregates  show atypical \"spindled\" morphology and express weak CD2 by  immunohistochemistry. Rare to occasional mast  cells are also seen in the aspirate and particle crush preparations.  Concurrent flow cytometry ([id number])  performed on bone marrow showed an abnormal mast cell population, with no  increase in myeloid blasts and no  abnormal myeloid blast population. Final classification requires  correlation with the results of genetic and  other laboratory studies and the clinical features.    I have personally reviewed all specimens and/or slides, including the  listed special stains, and used them  with my medical judgment to determine the final diagnosis.    Electronically signed out by:  Kaitlynn Ozuna MD    Technical testing/processing performed at [place], [place]    CLINICAL HISTORY:  Per [facility] medical records: 59-year-old male with no significant past  medical history presented to dermatology  with a slowly progressive itchy rash since 2014. Patient underwent punch  biopsy ([id number], 8/11/20) and was  subsequently diagnosed with cutaneous " mastocytosis. Diagnostic bone marrow   biopsy is obtained for evaluation.    REPORT:  Procedure/Gross Description  Aspirate(s) and trephine(s) procured by VICTOR MANUEL Cardoso    Specimen sent for Special Studies:       Flow Cytometry (left aspirate)       Cytogenetics (left aspirate)       Molecular Diagnostics (left aspirate)    Biopsy aspiration site: Left posterior iliac crest                 (Reference Range)         Amount of aspirate              4.5      mL       Fat and P.V. cell layer           1      %     (1 - 3)       Particles                        1      %       Myeloid-erythroid layer          2      %     (5 - 8)         Clot Section: no    Trephine biopsy site: unilateral    Designated left (B) posterior iliac crest is 1 cylinder of gritty tissue,  labeled with the patient's name and  hospital number, obtained with 11 gauge needle, and having a length of 13  mm; entirely submitted in 1  cassette; acetic zinc formalin fixed, decalcified, processed, and stained  with hematoxylin and eosin per  laboratory protocol.    PERIPHERAL BLOOD DATA:    CLINICAL LAB RESULTS:  Battery Order No. Lab Test Code Clinical Result Ref. Range Units Result  Date  Hemogram/Diff/PLT F241  WBC Count 6.2 4.0-11.0 10e9/L 10/16/2020 10:06       RBC Count 4.63 4.4-5.9 10e12/L 10/16/2020 10:06       Hemoglobin 14.0 13.3-17.7 g/dL 10/16/2020 10:06       Hematocrit 43.1 40.0-53.0 % 10/16/2020 10:06       MCV 93  fl 10/16/2020 10:06       MCH 30.2 26.5-33.0 pg 10/16/2020 10:06       MCHC 32.5 31.5-36.5 g/dL 10/16/2020 10:06       RDW 12.4 10.0-15.0 % 10/16/2020 10:06       Platelet Count 270 150-450 10e9/L 10/16/2020 10:06        SEE TEXT   10/16/2020 10:06       Text/Comments:  Automated Method       % Neutrophils 56.0  % 10/16/2020 10:06       % Lymphocytes 35.7  % 10/16/2020 10:06       % Monocytes 5.8  % 10/16/2020 10:06       % Eosinophils 1.6  % 10/16/2020 10:06       % Basophils 0.6  % 10/16/2020 10:06       % Immature  Grans 0.3  % 10/16/2020 10:06       Nucleated RBCs 0 0 /100 10/16/2020 10:06       abs Neutrophils 3.5 1.6-8.3 10e9/L 10/16/2020 10:06       abs Lymphocytes 2.2 0.8-5.3 10e9/L 10/16/2020 10:06       abs Monocytes 0.4 0.0-1.3 10e9/L 10/16/2020 10:06       abs Eosinophils 0.1 0.0-0.7 10e9/L 10/16/2020 10:06       abs Basophils 0.0 0.0-0.2 10e9/L 10/16/2020 10:06       abs Imm Granulocytes 0.0 0-0.4 10e9/L 10/16/2020 10:06       abs NRBC 0.0   10/16/2020 10:06    Retic   Retic % 0.8 0.5-2.0 % 10/16/2020 10:06       Retic abs 37.0 25-95 10e9/L 10/16/2020 10:06    MICROSCOPIC DESCRIPTION:  PERIPHERAL BLOOD MORPHOLOGY:  The red blood cells appear normochromic. Poikilocytosis is minimal.  Polychromasia is not increased. Rouleaux  formation is not increased. The morphology of the platelets is normal,  with small platelet clumps identified.  Lymphocytes include reactive forms. Neutrophils display unremarkable  cytoplasmic granularity and nuclear  morphology. No circulating mast cells are seen on scanning.    Bone marrow aspirates and touch imprints of bone marrow biopsy are  reviewed.    BONE MARROW DIFFERENTIAL (500 cells on direct aspirate smear):  Percent Cell (reference range)  0% Blasts (0 - 1)  1% Neutrophil promyelocytes (2 - 4)  59% Neutrophils and precursors (54 - 63)  15% Erythroid precursors (18 - 24)  3% Monocytes (1 - 1.5)  3% Eosinophils (1 - 3)  0% Basophils (0 - 1)  17% Lymphocytes (8 - 12)  1% Plasma cells (0 - 1.5)  1% Mast cells    Neutrophil maturation is complete. Erythroid maturation is complete. Rare  terminal erythroid precursors show  atypical hemoglobinization. Megakaryocytes are present and show an overall   normal morphologic spectrum. Rare  mast cells are seen on the aspirate smears; mast cells are more prominent  on the particle crush.    Iron Stains:  Dacie iron stain on concentrate smears: iron stains show 9% sideroblasts.  A rare ring sideroblast is seen on  scanning. Prussian blue stain on  crush preparation: marrow iron stores are   adequate.    Trephine Sections:  The trephine core biopsy is adequate. The marrow cellularity is estimated  at 50-60%. Occasional large  aggregates of cells with granular cytoplasm are present, consistent with  mast cells. Each aggregate contains  more than 15 cells, approximately half of which show spindle morphology.  Outside of these aggregates, the  cellular composition reflects the aspirate differential. Megakaryocytes  are present with normal morphology and  distribution.    Immunohistochemistry:  Immunohistochemical stains are performed on the paraffin-embedded trephine   core sections with appropriately  reactive control tissue.     and mast cell tryptase highlight mast cells, which are scattered  throughout the marrow and can be seen  forming occasional large aggregates, each containing more than 15 cells.  The mast cells are weakly positive  for CD2. Together, the scattered and aggregated mast cells represent an  estimated 15% of marrow cellularity.    CD3 highlights increased scattered positive T cells, which also show  strong staining for CD2.    Note: These immunohistochemical stains are deemed medically necessary.  Some of the antigens may also be  evaluated by flow cytometry. Concurrent evaluation by immunohistochemistry   on clot and/or trephine sections is  indicated in this case in order to correlate immunophenotype with cell  morphology and determine extent of  involvement, spatial pattern, and focality of potential disease  distribution.    Resident or fellow review by: Lashell Ibrahim PGY1    A resident/fellow in an ACGME accredited training program was involved in  the initial review, preparation,  and/or interpretation of this case. I, as the senior physician, attest  that I: (i) reviewed patient clinical  records if indicated; (ii) reviewed relevant lab test results; (iii)  examined the relevant preparation(s) for  the specimen(s); and (iv)  agree with the report, diagnosis(es), and  interpretation as documented by the  resident/fellow and edited/confirmed by me.    CPT Codes:  A: 77044-YHUJT, 38033-ETCI, HBM, 87015-ENJD, 37293-JZFTF, 04345-QZEYF,  34546-WYRBD, 00358-EYUFI, 48561-LPIHC,  24758-RHK, 29531-PWC, 51873-HNB, 17092-XFT, 45111-KRO, 74328-SQX <CR>,  37320-LJNGIG+    TESTING LAB LOCATION:  [facility], [facility] [address] [place]   [address] [phone]        Assessment:  Indolent systemic mastocytosis with severe uritcaria pigmentosa, refractory to symptomatic treatments.  He is an excellent candidate for avapritinib, which has been approved at a dose of 25 mg daily for indolent systemic mastocytosis. His symptoms have not responded to all of the usual symptomatic treatments, and non-FDA approved treatments such as midostaurin, hydroxyurea or interferon are less likely to be effective with more toxicity. Therefore he should be approved for use of avapritinib 25 mg daily for his indolent systemic mastocytosis.     Sincerely,      Yadira Sharpe MD  Hematology

## 2023-09-20 NOTE — TELEPHONE ENCOUNTER
Prior Authorization Approval    Medication: AYVAKIT 25 MG PO TABS  Authorization Effective Date: 9/21/2023  Authorization Expiration Date: 9/19/2024  Approved Dose/Quantity: 30/30  Reference #: [id number]   Insurance Company: MEDICA - Phone [phone] Fax [phone]  Expected CoPay: $0     CoPay Card Available: Yes    Financial Assistance Needed: No  [facility] [place] [address]  Pharmacy Notified: Yes  Patient Notified: Yes

## 2023-09-20 NOTE — NURSING NOTE
"Oncology Rooming Note    September 20, 2023 11:24 AM   Ren Yung is a 62 year old male who presents for:    Chief Complaint   Patient presents with    Oncology Clinic Visit     Indolent systemic mastocytosis      Initial Vitals: /85 (BP Location: Right arm, Patient Position: Sitting, Cuff Size: Adult Large)   Wt 105.9 kg (233 lb 8 oz)   BMI 34.48 kg/m   Estimated body mass index is 34.48 kg/m  as calculated from the following:    Height as of 9/15/15: 1.753 m (5' 9\").    Weight as of this encounter: 105.9 kg (233 lb 8 oz). Body surface area is 2.27 meters squared.  No Pain (1) Comment: Data Unavailable   No LMP for male patient.  Allergies reviewed: Yes  Medications reviewed: Yes    Medications: Medication refills not needed today.  Pharmacy name entered into [employer]:    [id number] - [place], [place] - [address] [place] [address] PHARMACY [place], [place] - [address] [facility] [address]  [place], [place] - [address] [facility] - [place], [place] - [address]     Clinical concerns: none       Karolina Quesada              "

## 2023-09-20 NOTE — TELEPHONE ENCOUNTER
PA Initiation    Medication: AYVAKIT 25 MG PO TABS  Insurance Company: MEDICA - Phone [phone] Fax [phone]  Pharmacy Filling the Rx:    Filling Pharmacy Phone:    Filling Pharmacy Fax:    Start Date: 9/20/2023

## 2023-09-22 ENCOUNTER — TELEPHONE (OUTPATIENT)
Dept: ONCOLOGY | Facility: CLINIC | Age: 63
End: 2023-09-22
Payer: COMMERCIAL

## 2023-09-22 DIAGNOSIS — D47.02 INDOLENT SYSTEMIC MASTOCYTOSIS: Primary | ICD-10-CM

## 2023-09-22 DIAGNOSIS — D47.02 INDOLENT SYSTEMIC MASTOCYTOSIS: ICD-10-CM

## 2023-09-22 DIAGNOSIS — Z79.899 ENCOUNTER FOR LONG-TERM (CURRENT) USE OF HIGH-RISK MEDICATION: ICD-10-CM

## 2023-09-22 NOTE — TELEPHONE ENCOUNTER
"Oral Chemotherapy Monitoring Program    Lab Monitoring Plan  CBC: Baseline, then every 2 weeks x 2 months.  After 2 months, Q2W if platelets < 75, monthly if platelets > 75  CMP, Mg, & Phos : Baseline and monthly     Labs drawn outside of [place]:  [facility] and clinics in [place], [place].  Phone# [phone]; Fax# [phone].  Patient is ok to receive stable lab results via [employer] message.  Subjective/Objective:  Ren Yung is a 62 year old male contacted by phone for an initial visit for oral chemotherapy education.          9/20/2023     1:00 PM 9/22/2023    12:00 PM 9/22/2023    12:55 PM 9/22/2023     2:00 PM   ORAL CHEMOTHERAPY   Assessment Type Initial Work up Refill Left Voicemail New Teach   Diagnosis Code Mastocytosis Mastocytosis Mastocytosis Mastocytosis   Providers Dr. Dell Sharpe   [facility]   Is this patient followed by the [facility] OC team? No No No No   Drug Name Ayvakit (avapritinib) Ayvakit (avapritinib) Ayvakit (avapritinib) Ayvakit (avapritinib)   Dose 25 mg 25 mg 25 mg 25 mg   Current Schedule Daily Daily Daily Daily   Cycle Details Continuous Continuous Continuous Continuous   Any new drug interactions? Yes   Yes   Pharmacist Intervention? Yes   Yes   Intervention(s) Patient Education   Patient Education   Is the dose as ordered appropriate for the patient? Yes   Yes       Last PHQ-2 Score on record:       3/9/2023     1:37 PM 6/7/2022     9:37 AM   PHQ-2 ( 1999 [employer])   Q1: Little interest or pleasure in doing things 0 0   Q2: Feeling down, depressed or hopeless 0 0   PHQ-2 Score 0 0       Vitals:  BP:   BP Readings from Last 1 Encounters:   09/20/23 138/85     Wt Readings from Last 1 Encounters:   09/20/23 105.9 kg (233 lb 8 oz)     Estimated body surface area is 2.27 meters squared as calculated from the following:    Height as of 9/15/15: 1.753 m (5' 9\").    Weight as of 9/20/23: 105.9 kg (233 lb 8 " oz).    Labs:  No results found for NA within last 30 days.     No results found for K within last 30 days.     No results found for CA within last 30 days.     No results found for Mag within last 30 days.     No results found for Phos within last 30 days.     No results found for ALBUMIN within last 30 days.     No results found for BUN within last 30 days.     No results found for CR within last 30 days.     No results found for AST within last 30 days.     No results found for ALT within last 30 days.     No results found for BILITOTAL within last 30 days.     No results found for WBC within last 30 days.     No results found for HGB within last 30 days.     No results found for PLT within last 30 days.     No results found for ANC within last 30 days.     No results found for ANC within last 30 days.        Assessment:  Patient is appropriate to start therapy after baseline labs are drawn.  Ren said he will have these done next week on 9/25 or 9/26 at [facility] in [place], [place] (Phone# [phone]; Fax# [phone]. He is aware not to start Ayvakit until after he hears from pharmacy staff  that labs results are good and he's ok to start.      Plan:  Basic chemotherapy teaching was reviewed with the patient and the patient's family including indication, start date of therapy, dose, administration, adverse effects, missed doses, food and drug interactions, monitoring, side effect management, office contact information, and safe handling. Written materials were provided and all questions answered.    Follow-Up:  9/25: OC Pharmacy will watch for baseline labs  ~10/3: Initial follow up  10/10: Appt with Dr. Gurjit Antonio, PharmD  Hematology/Oncology Clinical Pharmacist  [facility]  [phone]

## 2023-09-22 NOTE — TELEPHONE ENCOUNTER
Oral Chemotherapy Monitoring Program     Placed call to Ren Yung for new start teach on Ayvakit oral chemotherapy.     Left a message requesting a call back. No drug names were mentioned in the voicemail. Will update when response is received.    Kaitlynn Antonio, RylandD  Oral Chemotherapy Monitoring Program  [facility]  [phone]  September 22, 2023

## 2023-09-26 ENCOUNTER — TELEPHONE (OUTPATIENT)
Dept: PHARMACY | Facility: CLINIC | Age: 63
End: 2023-09-26
Payer: COMMERCIAL

## 2023-09-26 NOTE — ORAL ONC MGMT
Oral Chemotherapy Monitoring Program  Lab Follow Up    Reviewed lab results from 9/25/23.        9/20/2023     1:00 PM 9/22/2023    12:00 PM 9/22/2023    12:55 PM 9/22/2023     2:00 PM 9/26/2023    11:00 AM   ORAL CHEMOTHERAPY   Assessment Type Initial Work up Refill Left Voicemail New Teach Lab Monitoring   Diagnosis Code Mastocytosis Mastocytosis Mastocytosis Mastocytosis Mastocytosis   Providers Dr. Dell Sharpe   [facility]   Is this patient followed by the [facility] OC team? No No No No No   Drug Name Ayvakit (avapritinib) Ayvakit (avapritinib) Ayvakit (avapritinib) Ayvakit (avapritinib) Ayvakit (avapritinib)   Dose 25 mg 25 mg 25 mg 25 mg 25 mg   Current Schedule Daily Daily Daily Daily Daily   Cycle Details Continuous Continuous Continuous Continuous Continuous   Any new drug interactions? Yes   Yes    Pharmacist Intervention? Yes   Yes    Intervention(s) Patient Education   Patient Education    Is the dose as ordered appropriate for the patient? Yes   Yes        Labs:        Assessment & Plan:  Reviewed baseline labs with Ren.  His creatinine has increased from baseline.  However his renal function is still >60ml/min and no dose adjustments recommended.  Encouraged Ren to drink plenty of fluids and stay hydrated.  We will continue to monitor serum creatinine.    Follow-Up:  7-10 days initial assessment phone call.    Tyrone Toledo, PharmD.  Oral Chemotherapy Monitoring Program  [phone]

## 2023-10-04 ENCOUNTER — TELEPHONE (OUTPATIENT)
Dept: ONCOLOGY | Facility: CLINIC | Age: 63
End: 2023-10-04
Payer: COMMERCIAL

## 2023-10-04 NOTE — TELEPHONE ENCOUNTER
Oral Chemotherapy Monitoring Program    Subjective/Objective:  Ren Yung is a 62 year old male contacted by phone for a follow-up visit for oral chemotherapy.  Ren reports starting Avapritinib Wednesday 9/27. He confirmed taking one tablet daily (25 mg) with no missed doses since starting. He reports doing overall well since starting besides some increased nausea and a little abdominal pain. He reports not having any medication at home to help with nausea but thinks it would be helpful to have something available if needed. Denies vomiting, fluid retention, and appetite changes.         9/20/2023     1:00 PM 9/22/2023    12:00 PM 9/22/2023    12:55 PM 9/22/2023     2:00 PM 9/26/2023    11:00 AM 10/4/2023     2:00 PM   ORAL CHEMOTHERAPY   Assessment Type Initial Work up Refill Left Voicemail New Teach Lab Monitoring Initial Follow up   Diagnosis Code Mastocytosis Mastocytosis Mastocytosis Mastocytosis Mastocytosis Mastocytosis   Providers Dr. Dell Sharpe   [facility]   Is this patient followed by the [facility] OC team? No No No No No No   Drug Name Ayvakit (avapritinib) Ayvakit (avapritinib) Ayvakit (avapritinib) Ayvakit (avapritinib) Ayvakit (avapritinib) Ayvakit (avapritinib)   Dose 25 mg 25 mg 25 mg 25 mg 25 mg 25 mg   Current Schedule Daily Daily Daily Daily Daily Daily   Cycle Details Continuous Continuous Continuous Continuous Continuous Continuous   Start Date of Last Cycle      9/27/2023   Doses missed in last 2 weeks      0   Adherence Assessment      Adherent   Adverse Effects      Nausea   Nausea      Grade 1   Pharmacist Intervention(nausea)      Yes   Intervention(s)      Rx medication recommendation   Any new drug interactions? Yes   Yes     Pharmacist Intervention? Yes   Yes     Intervention(s) Patient Education   Patient Education     Is the dose as ordered appropriate for the patient? Yes   Yes    "      Last PHQ-2 Score on record:       3/9/2023     1:37 PM 6/7/2022     9:37 AM   PHQ-2 ( 1999 [employer])   Q1: Little interest or pleasure in doing things 0 0   Q2: Feeling down, depressed or hopeless 0 0   PHQ-2 Score 0 0       Vitals:  BP:   BP Readings from Last 1 Encounters:   09/20/23 138/85     Wt Readings from Last 1 Encounters:   09/20/23 105.9 kg (233 lb 8 oz)     Estimated body surface area is 2.27 meters squared as calculated from the following:    Height as of 9/15/15: 1.753 m (5' 9\").    Weight as of 9/20/23: 105.9 kg (233 lb 8 oz).    Labs:  No results found for NA within last 30 days.     No results found for K within last 30 days.     No results found for CA within last 30 days.     No results found for Mag within last 30 days.     No results found for Phos within last 30 days.     No results found for ALBUMIN within last 30 days.     No results found for BUN within last 30 days.     No results found for CR within last 30 days.     No results found for AST within last 30 days.     No results found for ALT within last 30 days.     No results found for BILITOTAL within last 30 days.     No results found for WBC within last 30 days.     No results found for HGB within last 30 days.     No results found for PLT within last 30 days.     No results found for ANC within last 30 days.     No results found for ANC within last 30 days.        Assessment/Plan:  Ren is appropriate to continue taking Avapritinib as prescribed. Sent IB msg to care team to recommend sending a prescription for ondansetron to his preferred pharmacy so he has something at home for nausea if needed. Agreed to get labs next Wednesday 10/11 at [facility].     Follow-Up:  Labs 10/11 at [facility] in [facility]- 2 weeks after starting per standard of care    Amy Cano, Tania  Hematology/Oncology Clinical Pharmacist  [facility]  [phone]  "

## 2023-10-05 DIAGNOSIS — R11.0 NAUSEA: Primary | ICD-10-CM

## 2023-10-05 RX ORDER — ONDANSETRON 4 MG/1
4 TABLET, FILM COATED ORAL EVERY 8 HOURS PRN
Qty: 30 TABLET | Refills: 1 | Status: SHIPPED | OUTPATIENT
Start: 2023-10-05

## 2023-10-11 ENCOUNTER — MYC MEDICAL ADVICE (OUTPATIENT)
Dept: ONCOLOGY | Facility: CLINIC | Age: 63
End: 2023-10-11
Payer: COMMERCIAL

## 2023-10-11 NOTE — TELEPHONE ENCOUNTER
Oral Chemotherapy Monitoring Program  Lab Follow Up    Reviewed lab results from 10/11/23.        9/20/2023     1:00 PM 9/22/2023    12:00 PM 9/22/2023    12:55 PM 9/22/2023     2:00 PM 9/26/2023    11:00 AM 10/4/2023     2:00 PM 10/11/2023     3:00 PM   ORAL CHEMOTHERAPY   Assessment Type Initial Work up Refill Left Voicemail New Teach Lab Monitoring Initial Follow up Lab Monitoring   Diagnosis Code Mastocytosis Mastocytosis Mastocytosis Mastocytosis Mastocytosis Mastocytosis Mastocytosis   Providers Dr. Dell Sharpe   [facility]   Is this patient followed by the [facility] OC team? No No No No No No No   Drug Name Ayvakit (avapritinib) Ayvakit (avapritinib) Ayvakit (avapritinib) Ayvakit (avapritinib) Ayvakit (avapritinib) Ayvakit (avapritinib) Ayvakit (avapritinib)   Dose 25 mg 25 mg 25 mg 25 mg 25 mg 25 mg 25 mg   Current Schedule Daily Daily Daily Daily Daily Daily Daily   Cycle Details Continuous Continuous Continuous Continuous Continuous Continuous Continuous   Start Date of Last Cycle      9/27/2023 9/27/2023   Doses missed in last 2 weeks      0    Adherence Assessment      Adherent    Adverse Effects      Nausea    Nausea      Grade 1    Pharmacist Intervention(nausea)      Yes    Intervention(s)      Rx medication recommendation    Any new drug interactions? Yes   Yes      Pharmacist Intervention? Yes   Yes      Intervention(s) Patient Education   Patient Education      Is the dose as ordered appropriate for the patient? Yes   Yes          Labs:  No results found for NA within last 30 days.     No results found for K within last 30 days.     No results found for CA within last 30 days.     No results found for Mag within last 30 days.     No results found for Phos within last 30 days.     No results found for ALBUMIN within last 30 days.     No results found for BUN within last 30 days.     No  results found for CR within last 30 days.     No results found for AST within last 30 days.     No results found for ALT within last 30 days.     No results found for BILITOTAL within last 30 days.     No results found for WBC within last 30 days.     No results found for HGB within last 30 days.     No results found for PLT within last 30 days.     No results found for ANC within last 30 days.     No results found for ANC within last 30 days.        Assessment & Plan:  Results show no concerning abnormalities.  [employer] message sent to patient.    Follow-Up:  10/25: due for labs    Ryland MendozaD  Hematology/Oncology Clinical Pharmacist  [facility]  [phone]

## 2023-10-23 DIAGNOSIS — L20.89 OTHER ATOPIC DERMATITIS: ICD-10-CM

## 2023-10-27 ENCOUNTER — MYC MEDICAL ADVICE (OUTPATIENT)
Dept: ONCOLOGY | Facility: CLINIC | Age: 63
End: 2023-10-27
Payer: COMMERCIAL

## 2023-10-27 NOTE — TELEPHONE ENCOUNTER
Oral Chemotherapy Monitoring Program  Lab Follow Up    Reviewed lab results from 10/27/23.        9/22/2023    12:00 PM 9/22/2023    12:55 PM 9/22/2023     2:00 PM 9/26/2023    11:00 AM 10/4/2023     2:00 PM 10/11/2023     3:00 PM 10/27/2023    12:00 PM   ORAL CHEMOTHERAPY   Assessment Type Refill Left Voicemail New Teach Lab Monitoring Initial Follow up Lab Monitoring Lab Monitoring   Diagnosis Code Mastocytosis Mastocytosis Mastocytosis Mastocytosis Mastocytosis Mastocytosis Mastocytosis   Providers Dr. Dell Sharpe   [facility]   Is this patient followed by the [facility] OC team? No No No No No No No   Drug Name Ayvakit (avapritinib) Ayvakit (avapritinib) Ayvakit (avapritinib) Ayvakit (avapritinib) Ayvakit (avapritinib) Ayvakit (avapritinib) Ayvakit (avapritinib)   Dose 25 mg 25 mg 25 mg 25 mg 25 mg 25 mg 25 mg   Current Schedule Daily Daily Daily Daily Daily Daily Daily   Cycle Details Continuous Continuous Continuous Continuous Continuous Continuous Continuous   Start Date of Last Cycle     9/27/2023 9/27/2023    Doses missed in last 2 weeks     0     Adherence Assessment     Adherent     Adverse Effects     Nausea     Nausea     Grade 1     Pharmacist Intervention(nausea)     Yes     Intervention(s)     Rx medication recommendation     Any new drug interactions?   Yes       Pharmacist Intervention?   Yes       Intervention(s)   Patient Education       Is the dose as ordered appropriate for the patient?   Yes           Labs:  No results found for NA within last 30 days.     No results found for K within last 30 days.     No results found for CA within last 30 days.     No results found for Mag within last 30 days.     No results found for Phos within last 30 days.     No results found for ALBUMIN within last 30 days.     No results found for BUN within last 30 days.     No results found for CR  within last 30 days.     No results found for AST within last 30 days.     No results found for ALT within last 30 days.     No results found for BILITOTAL within last 30 days.     No results found for WBC within last 30 days.     No results found for HGB within last 30 days.     No results found for PLT within last 30 days.     No results found for ANC within last 30 days.     No results found for ANC within last 30 days.        Assessment & Plan:  No concerning lab abnormalities.  No changes with Avapritinib needed at this time.    [employer] message sent to patient.    Carlee Hughes, PharmD, BCPS, BCOP  Oncology Clinical Pharmacy Specialist  [facility]  [phone]

## 2023-11-08 ENCOUNTER — TELEPHONE (OUTPATIENT)
Dept: DERMATOLOGY | Facility: CLINIC | Age: 63
End: 2023-11-08
Payer: COMMERCIAL

## 2023-11-08 NOTE — TELEPHONE ENCOUNTER
Prior Authorization Approval    Medication: DUPIXENT 300 MG/2ML SC SOPN  Authorization Effective Date: 10/9/2023  Authorization Expiration Date: 3/7/2024  Approved Dose/Quantity: 4ml per 28 days  Reference #: Key: [id number]   Insurance Company: [employer] - Phone [phone] Fax [phone]  Expected CoPay: $    CoPay Card Available: No    Financial Assistance Needed: no  Which Pharmacy is filling the prescription: [facility] [address]  Pharmacy Notified: yes  Patient Notified: no

## 2023-11-08 NOTE — TELEPHONE ENCOUNTER
PA Initiation    Medication: DUPIXENT 300 MG/2ML SC SOPN  Insurance Company: MED[employer] - Phone [phone] Fax [phone]  Pharmacy Filling the Rx: [facility] [place] [place] - [address] [place] [address]  Filling Pharmacy Phone:    Filling Pharmacy Fax:    Start Date: 11/8/2023

## 2023-11-14 ENCOUNTER — MYC MEDICAL ADVICE (OUTPATIENT)
Dept: DERMATOLOGY | Facility: CLINIC | Age: 63
End: 2023-11-14
Payer: COMMERCIAL

## 2023-11-14 DIAGNOSIS — L20.89 OTHER ATOPIC DERMATITIS: ICD-10-CM

## 2023-11-14 NOTE — TELEPHONE ENCOUNTER
8/1/2023 Assessment & Plan:   #. Cutaneous and systemic mastocytosis  #. Chronic idiopathic urticaria  With failure of several first-line therapies to date, to include antihistamines, home light therapy, topical antihistamines, Xolair and cromolyn sodium despite compliance with these therapies.  - Continue topical lidocaine prn, systemic cetirizine.   - Episodes of lightheadedness possibly due to histamine response causing vasodilation. Recommend increasing salt/electrolyte intake during these episodes, lying down, elevating legs. Histamine reactions often short lived. Go to ER if passing out or difficulty breathing  - Continue cromolyn sodium.   - Start Dupixent. Approved, waiting on pharmacy  - Heme/onc working on getting newly approved PDGFRA TKI avapritinib approved  - Future:   -Consider starting avapritinib, a kinase inhibitor, with Dr. Sharpe in oncology              -UVA1 if home nbUVB unsuccessful

## 2023-11-15 ENCOUNTER — MYC MEDICAL ADVICE (OUTPATIENT)
Dept: ONCOLOGY | Facility: CLINIC | Age: 63
End: 2023-11-15
Payer: COMMERCIAL

## 2023-11-15 NOTE — TELEPHONE ENCOUNTER
Oral Chemotherapy Monitoring Program  Lab Follow Up    Reviewed lab results from 11/14.        9/22/2023    12:55 PM 9/22/2023     2:00 PM 9/26/2023    11:00 AM 10/4/2023     2:00 PM 10/11/2023     3:00 PM 10/27/2023    12:00 PM 11/15/2023    12:00 PM   ORAL CHEMOTHERAPY   Assessment Type Left Voicemail New Teach Lab Monitoring Initial Follow up Lab Monitoring Lab Monitoring Lab Monitoring   Diagnosis Code Mastocytosis Mastocytosis Mastocytosis Mastocytosis Mastocytosis Mastocytosis Mastocytosis   Providers Dr. Dell Sharpe   [facility]   Is this patient followed by the [facility] OC team? No No No No No No No   Drug Name Ayvakit (avapritinib) Ayvakit (avapritinib) Ayvakit (avapritinib) Ayvakit (avapritinib) Ayvakit (avapritinib) Ayvakit (avapritinib) Ayvakit (avapritinib)   Dose 25 mg 25 mg 25 mg 25 mg 25 mg 25 mg 25 mg   Current Schedule Daily Daily Daily Daily Daily Daily Daily   Cycle Details Continuous Continuous Continuous Continuous Continuous Continuous Continuous   Start Date of Last Cycle    9/27/2023 9/27/2023     Doses missed in last 2 weeks    0      Adherence Assessment    Adherent      Adverse Effects    Nausea      Nausea    Grade 1      Pharmacist Intervention(nausea)    Yes      Intervention(s)    Rx medication recommendation      Any new drug interactions?  Yes        Pharmacist Intervention?  Yes        Intervention(s)  Patient Education        Is the dose as ordered appropriate for the patient?  Yes            Labs:  No results found for NA within last 30 days.     No results found for K within last 30 days.     No results found for CA within last 30 days.     No results found for Mag within last 30 days.     No results found for Phos within last 30 days.     No results found for ALBUMIN within last 30 days.     No results found for BUN within last 30 days.     No results found for CR  within last 30 days.     No results found for AST within last 30 days.     No results found for ALT within last 30 days.     No results found for BILITOTAL within last 30 days.     No results found for WBC within last 30 days.     No results found for HGB within last 30 days.     No results found for PLT within last 30 days.     No results found for ANC within last 30 days.     No results found for ANC within last 30 days.        Assessment & Plan:  No concerning abnormalities. [email or web address] message sent to patient.    Follow-Up:  Due for labs on 11/28     Jaylene Sandra, PharmD, BCOP  Oral Chemotherapy Monitoring Program  [facility]  [phone]

## 2023-11-28 ENCOUNTER — TELEPHONE (OUTPATIENT)
Dept: ONCOLOGY | Facility: CLINIC | Age: 63
End: 2023-11-28
Payer: COMMERCIAL

## 2023-11-28 NOTE — TELEPHONE ENCOUNTER
Oral Chemotherapy Monitoring Program  Lab Follow Up    Reviewed lab results from 11/28. I called Mr. Yung to review his lab results and perform a routine assessment. He reports good compliance with avapritinib 25 mg daily. He reports no missed doses in the past 2 weeks. He has occasional nausea, but this is well managed. He denies edema, diarrhea, and other new side effects.        9/22/2023     2:00 PM 9/26/2023    11:00 AM 10/4/2023     2:00 PM 10/11/2023     3:00 PM 10/27/2023    12:00 PM 11/15/2023    12:00 PM 11/28/2023     1:00 PM   ORAL CHEMOTHERAPY   Assessment Type New Teach Lab Monitoring Initial Follow up Lab Monitoring Lab Monitoring Lab Monitoring Lab Monitoring;Monthly Follow up   Diagnosis Code Mastocytosis Mastocytosis Mastocytosis Mastocytosis Mastocytosis Mastocytosis Mastocytosis   Providers Dr. Dell Sharpe   [facility]   Is this patient followed by the [facility] OC team? No No No No No No No   Drug Name Ayvakit (avapritinib) Ayvakit (avapritinib) Ayvakit (avapritinib) Ayvakit (avapritinib) Ayvakit (avapritinib) Ayvakit (avapritinib) Ayvakit (avapritinib)   Dose 25 mg 25 mg 25 mg 25 mg 25 mg 25 mg 25 mg   Current Schedule Daily Daily Daily Daily Daily Daily Daily   Cycle Details Continuous Continuous Continuous Continuous Continuous Continuous Continuous   Start Date of Last Cycle   9/27/2023 9/27/2023      Doses missed in last 2 weeks   0    0   Adherence Assessment   Adherent    Adherent   Adverse Effects   Nausea    Nausea   Nausea   Grade 1    Grade 1   Pharmacist Intervention(nausea)   Yes       Intervention(s)   Rx medication recommendation       Any new drug interactions? Yes         Pharmacist Intervention? Yes         Intervention(s) Patient Education         Is the dose as ordered appropriate for the patient? Yes      Yes       Labs:  No results found for NA within last  30 days.     No results found for K within last 30 days.     No results found for CA within last 30 days.     No results found for Mag within last 30 days.     No results found for Phos within last 30 days.     No results found for ALBUMIN within last 30 days.     No results found for BUN within last 30 days.     No results found for CR within last 30 days.     No results found for AST within last 30 days.     No results found for ALT within last 30 days.     No results found for BILITOTAL within last 30 days.     No results found for WBC within last 30 days.     No results found for HGB within last 30 days.     No results found for PLT within last 30 days.     No results found for ANC within last 30 days.     No results found for ANC within last 30 days.        Assessment & Plan:  No concerning abnormalities. His platelets remain above 75K, so we can transition to monthly monitoring. He will continue with avapritinib treatment as planned.    Questions answered to patient's satisfaction.    Follow-Up:  12/28 labs    Jaylene Sandra, PharmD, BCOP  Oral Chemotherapy Monitoring Program  [facility]  [phone]

## 2023-11-29 ENCOUNTER — MYC MEDICAL ADVICE (OUTPATIENT)
Dept: ONCOLOGY | Facility: CLINIC | Age: 63
End: 2023-11-29
Payer: COMMERCIAL

## 2023-12-28 ENCOUNTER — TELEPHONE (OUTPATIENT)
Dept: ONCOLOGY | Facility: CLINIC | Age: 63
End: 2023-12-28
Payer: COMMERCIAL

## 2023-12-28 NOTE — ORAL ONC MGMT
Oral Chemotherapy Monitoring Program  Lab Follow Up    Patient is on avapritinib  Reviewed lab results from 12/28/23.    Assessment & Plan:  CBC, CMP, magnesium, phosphorus reviewed. Results show no concerning abnormalities.  Plan to continue avapritinib as prescribed. [employer] message sent to pt    I attempted to complete an assessment today, but no answer to phone call. VM was left with no specific detail    Follow-Up:  1/11: Dr Sharpe appt  ~1/27/24: due for next monthly labs      Ryland AndradeD  Hematology/Oncology Clinical Pharmacist  Oral Chemotherapy Monitoring Program  [facility]  [phone]  December 28, 2023 9/26/2023    11:00 AM 10/4/2023     2:00 PM 10/11/2023     3:00 PM 10/27/2023    12:00 PM 11/15/2023    12:00 PM 11/28/2023     1:00 PM 12/28/2023     1:00 PM   ORAL CHEMOTHERAPY   Assessment Type Lab Monitoring Initial Follow up Lab Monitoring Lab Monitoring Lab Monitoring Lab Monitoring;Monthly Follow up Lab Monitoring   Diagnosis Code Mastocytosis Mastocytosis Mastocytosis Mastocytosis Mastocytosis Mastocytosis Mastocytosis   Providers Dr. Dell Sharpe   [facility]   Is this patient followed by the [facility] OC team? No No No No No No No   Drug Name Ayvakit (avapritinib) Ayvakit (avapritinib) Ayvakit (avapritinib) Ayvakit (avapritinib) Ayvakit (avapritinib) Ayvakit (avapritinib) Ayvakit (avapritinib)   Dose 25 mg 25 mg 25 mg 25 mg 25 mg 25 mg 25 mg   Current Schedule Daily Daily Daily Daily Daily Daily Daily   Cycle Details Continuous Continuous Continuous Continuous Continuous Continuous Continuous   Start Date of Last Cycle  9/27/2023 9/27/2023       Doses missed in last 2 weeks  0    0    Adherence Assessment  Adherent    Adherent    Adverse Effects  Nausea    Nausea No AE identified during assessment   Nausea  Grade 1    Grade 1    Pharmacist  Intervention(nausea)  Yes        Intervention(s)  Rx medication recommendation        Is the dose as ordered appropriate for the patient?      Yes Yes       Labs:  No results found for NA within last 30 days.     No results found for K within last 30 days.     No results found for CA within last 30 days.     No results found for Mag within last 30 days.     No results found for Phos within last 30 days.     No results found for ALBUMIN within last 30 days.     No results found for BUN within last 30 days.     No results found for CR within last 30 days.     No results found for AST within last 30 days.     No results found for ALT within last 30 days.     No results found for BILITOTAL within last 30 days.     No results found for WBC within last 30 days.     No results found for HGB within last 30 days.     No results found for PLT within last 30 days.     No results found for ANC within last 30 days.

## 2024-01-11 ENCOUNTER — VIRTUAL VISIT (OUTPATIENT)
Dept: ONCOLOGY | Facility: CLINIC | Age: 64
End: 2024-01-11
Attending: INTERNAL MEDICINE
Payer: COMMERCIAL

## 2024-01-11 VITALS — WEIGHT: 235 LBS | BODY MASS INDEX: 34.7 KG/M2

## 2024-01-11 DIAGNOSIS — D47.02 INDOLENT SYSTEMIC MASTOCYTOSIS: Primary | ICD-10-CM

## 2024-01-11 PROCEDURE — 99215 OFFICE O/P EST HI 40 MIN: CPT | Mod: 95 | Performed by: INTERNAL MEDICINE

## 2024-01-11 ASSESSMENT — PAIN SCALES - GENERAL: PAINLEVEL: NO PAIN (0)

## 2024-01-11 NOTE — NURSING NOTE
Is the patient currently in the state of [place]? YES    Visit mode:VIDEO    If the visit is dropped, the patient can be reconnected by: VIDEO VISIT: Text to cell phone:   Telephone Information:   Mobile [phone]       Will anyone else be joining the visit? NO  (If patient encounters technical issues they should call [phone])    How would you like to obtain your AVS? MyChart    Are changes needed to the allergy or medication list? Pt stated no changes to allergies and medication flagged for removal    Reason for visit: JEAN Johnson LPN

## 2024-01-11 NOTE — PROGRESS NOTES
Virtual Visit Details    Type of service:  Video Visit     Originating Location (pt. 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 Main problem is skin rash (urticaria pigmentosa) with pruritus. Started avapritinib 25 mg daily 9/25/23.  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     The trephine core biopsy is adequate. The marrow cellularity is estimated at 50-60%. Occasional large   aggregates of cells with granular cytoplasm are present, consistent with mast cells. Each aggregate contains   more than 15 cells, approximately half of which show spindle morphology.  and mast cell tryptase highlight mast cells, which are scattered   throughout the marrow and can be seen   forming occasional large aggregates, each containing more than 15 cells. The mast cells are weakly positive   for CD2. Together, the scattered and aggregated mast cells represent an estimated 15% of marrow cellularity.     -History of low back pain  -Status post torn meniscus of knee  -Bee sting allergy  -Varicose veins, no history of deep vein thrombosis  -Obesity, BMI 35     History of Present Illness: Mr. Yung is a 62 year old man with a history indolent systemic mastocytosis, seen by video for routine follow-up.He is accompanied by his wife. Started on avapritinib in September 2023. He reports that his itching is much improved. His skin is no longer red, still some mild discoloration.. He has some occasional nausea with the medication. He has decreased the cromolyn from 4x daiy to ~ twice daily, also decreased  famotidine and cetirizine.    He reports that he also had COVID in September 2023.  Since that time, still has some cough, nonproductive, occasional wheezing.  Also some cough sinus congestion and postnasal drip.  No shortness of breath.  He did receive antibiotics for his sinuses,  but says that did not seem to make much difference.    He also reports that for the past couple of weeks he occasionally feels fluttering in his chest, mainly in the evening, no chest pain or pressure.  He has not checked his pulse.  No shortness of breath with this sensation, wonders if it is anxiety.    Also a couple of days ago he rubbed his eye and it turned bright red.  No vision problem, not painful.  No other bleeding symptoms.      Physical exam:    General appearance:  Patient is 63 year old man in no acute distress.     HEENT:  No pallor, icterus. Lateral subconjunctival hemorrhage R eye.   Lungs: Normal respirations, no cough or audible wheezes.  Skin:  No rash, no petechiae or ecchymoses on face or neck.      Labs:  Care everywhere 12/28/2023  WBC 7.4, hemoglobin 13.6, platelets 254  Phosphorus 3.8, creatinine 0.97, calcium 9.0, ALP 91, AST 17, ALT 19, bili 0.6.    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  His symptoms of pruritus are dramatically improved after starting avapritinib 25 mg daily, a  tyrosine kinase inhibitor against PDGFRA and KIT, which has been approved for indolent mastocytosis.He will stay on it indefinitely.  He has not had toxicity- CNS, facial edema, gastrointestinal, marrow suppression.    -Continue monthly CBC with differential  -Check tryptase    # Subconjunctival hemorrhage- this is likely incidental, and he has no other bleeding symptoms.  Avapritinib can affect the PTT  -Check PTT on his next blood draw.    # Fluttering in his chest- not clear to me if he is having some sort of tachyarrhythmia.  I recommend he follow-up with his primary care physician regarding this    # Chronic cough after COVID- this may be some lingering effects with COVID, follow-up with his primary care.     RTC 6 months-       Time: I spent a total of 40 minutes on the day of the visit. Please see the note for further information on patient assessment and treatment.      Yadira Sharpe MD  Hematology

## 2024-01-11 NOTE — LETTER
1/11/2024         RE: Ren Yung  [address]  [facility] [address]        Dear Colleague,    Thank you for referring your patient, Ren Yung, to the [facility]. Please see a copy of my visit note below.    Virtual Visit Details    Type of service:  Video Visit     Originating Location (pt. 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 Main problem is skin rash (urticaria pigmentosa) with pruritus. Started avapritinib 25 mg daily 9/25/23.  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     The trephine core biopsy is adequate. The marrow cellularity is estimated at 50-60%. Occasional large   aggregates of cells with granular cytoplasm are present, consistent with mast cells. Each aggregate contains   more than 15 cells, approximately half of which show spindle morphology.  and mast cell tryptase highlight mast cells, which are scattered   throughout the marrow and can be seen   forming occasional large aggregates, each containing more than 15 cells. The mast cells are weakly positive   for CD2. Together, the scattered and aggregated mast cells represent an estimated 15% of marrow cellularity.     -History of low back pain  -Status post torn meniscus of knee  -Bee sting allergy  -Varicose veins, no history of deep vein thrombosis  -Obesity, BMI 35     History of Present Illness: Mr. Yung is a 62 year old man with a history indolent systemic mastocytosis, seen by video for routine follow-up.He is accompanied by his wife. Started on avapritinib in September 2023. He reports that his itching is much improved. His skin is no longer red, still some mild discoloration.. He has some occasional nausea with the medication. He has decreased the cromolyn from 4x daiy to ~ twice daily, also decreased  famotidine and  cetirizine.    He reports that he also had COVID in September 2023.  Since that time, still has some cough, nonproductive, occasional wheezing.  Also some cough sinus congestion and postnasal drip.  No shortness of breath.  He did receive antibiotics for his sinuses, but says that did not seem to make much difference.    He also reports that for the past couple of weeks he occasionally feels fluttering in his chest, mainly in the evening, no chest pain or pressure.  He has not checked his pulse.  No shortness of breath with this sensation, wonders if it is anxiety.    Also a couple of days ago he rubbed his eye and it turned bright red.  No vision problem, not painful.  No other bleeding symptoms.      Physical exam:    General appearance:  Patient is 63 year old man in no acute distress.     HEENT:  No pallor, icterus. Lateral subconjunctival hemorrhage R eye.   Lungs: Normal respirations, no cough or audible wheezes.  Skin:  No rash, no petechiae or ecchymoses on face or neck.      Labs:  Care everywhere 12/28/2023  WBC 7.4, hemoglobin 13.6, platelets 254  Phosphorus 3.8, creatinine 0.97, calcium 9.0, ALP 91, AST 17, ALT 19, bili 0.6.    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  His symptoms of pruritus are dramatically improved after starting avapritinib 25 mg daily, a  tyrosine kinase inhibitor against PDGFRA and KIT, which has been approved for indolent mastocytosis.He will stay on it indefinitely.  He has not had toxicity- CNS, facial edema, gastrointestinal, marrow suppression.    -Continue monthly CBC with differential  -Check tryptase    # Subconjunctival hemorrhage- this is likely incidental, and he has no other bleeding symptoms.  Avapritinib can affect the PTT  -Check PTT on his next blood draw.    # Fluttering in his chest- not  clear to me if he is having some sort of tachyarrhythmia.  I recommend he follow-up with his primary care physician regarding this    # Chronic cough after COVID- this may be some lingering effects with COVID, follow-up with his primary care.     RTC 6 months-      Time: I spent a total of 40 minutes on the day of the visit. Please see the note for further information on patient assessment and treatment.      Yadira Sharpe MD  Hematology

## 2024-01-12 ENCOUNTER — PATIENT OUTREACH (OUTPATIENT)
Dept: ONCOLOGY | Facility: CLINIC | Age: 64
End: 2024-01-12
Payer: COMMERCIAL

## 2024-01-12 NOTE — PROGRESS NOTES
[facility]: Cancer Care Follow-Up Note                                    Discussion with Patient:                                                      MyChart sent to request info on where to send lab orders to get them drawn at a lab local to him.         Goals          General    monitoring     Notes - Note created  1/12/2024  8:15 AM by Rosie Hi, RN    Goal Statement: I will use my clinic and care team resources as directed. I will monitor labs as directed.   Date Goal set: 1/12/2024  Barriers: disease burden  Strengths: support, coping, motivation, health awareness, and involvement with care team  Date to Achieve By: ongoing  Patient expressed understanding of goal: Yes  Action steps to achieve this goal:  I will call with difficulties of scheduling and/or transportation.   I will contact scheduling to arrange or make changes in my appointments.                 Dates of Treatment:                                                      Infusion given in last 28 days       None          Treatment Plan Medications       Avapritinib- indolent systemic mastocytosis       Take Home Medications       Medication Sig Start/End Day/Cycle Status    Avapritinib 25 MG TABS Take 25 mg by mouth daily 9/25/2023 to -- Day 1, Cycle 1 - 9/25/2023 Released                            Assessment:                                                        Plan of Care Education Review:   Assessment completed with:: Patient    Plan of Care Education   Plan of Care:: Lab appointment    Evaluation of Learning  Patient Education Provided: Yes  Method:: Explanation           Intervention/Education provided during outreach:                                                       MyChart sent.  Will fax labs once location is confirmed with pt.     Patient to follow up as scheduled at next appt  Patient to call/Self-A-r-Thart message with updates  Confirmed patient has clinic and triage numbers    Signature:  Rosie Hi RN    Update: Labs  faxed successfully to [facility] in [place], [place]

## 2024-01-12 NOTE — PATIENT INSTRUCTIONS
Continue avapritinib  Labs for end of January- CBC with differential and platelets, comprehensive metabolic panel, PTT, tryptase  Follow up with primary care regarding sensation of chest fluttering, chronic cough.

## 2024-01-17 ENCOUNTER — TELEPHONE (OUTPATIENT)
Dept: ONCOLOGY | Facility: CLINIC | Age: 64
End: 2024-01-17
Payer: COMMERCIAL

## 2024-01-17 DIAGNOSIS — D47.02 INDOLENT SYSTEMIC MASTOCYTOSIS: Primary | ICD-10-CM

## 2024-01-18 ENCOUNTER — TELEPHONE (OUTPATIENT)
Dept: ONCOLOGY | Facility: CLINIC | Age: 64
End: 2024-01-18
Payer: COMMERCIAL

## 2024-01-18 NOTE — ORAL ONC MGMT
Oral Chemotherapy Monitoring Program    Subjective/Objective:  Ren uYng is a 63 year old male contacted by phone for a follow-up visit for oral chemotherapy.  Tarik confirms taking the appropriate dose of avapritinib 25mg once daily. Denies new or worsening side effects, missed doses, and recent hospital or ED visits. Denies any recent medication changes. No questions or concerns. Aware of next appointments and plans to get labs 1/29. He has 6 days supply avapritinib left.         10/27/2023    12:00 PM 11/15/2023    12:00 PM 11/28/2023     1:00 PM 12/28/2023     1:00 PM 1/17/2024    10:00 AM 1/18/2024    10:00 AM 1/18/2024    10:44 AM   ORAL CHEMOTHERAPY   Assessment Type Lab Monitoring Lab Monitoring Lab Monitoring;Monthly Follow up Lab Monitoring Refill Left Voicemail Incoming phone call;Monthly Follow up   Diagnosis Code Mastocytosis Mastocytosis Mastocytosis Mastocytosis Mastocytosis Mastocytosis Mastocytosis   Providers Dr. Dell Sharpe   [facility]   Is this patient followed by the [facility] OC team? No No No No No No Yes   Drug Name Ayvakit (avapritinib) Ayvakit (avapritinib) Ayvakit (avapritinib) Ayvakit (avapritinib) Ayvakit (avapritinib) Ayvakit (avapritinib) Ayvakit (avapritinib)   Dose 25 mg 25 mg 25 mg 25 mg 25 mg 25 mg 25 mg   Current Schedule Daily Daily Daily Daily Daily Daily Daily   Cycle Details Continuous Continuous Continuous Continuous Continuous Continuous Continuous   Planned next cycle start date       1/24/2024   Doses missed in last 2 weeks   0    0   Adherence Assessment   Adherent    Adherent   Adverse Effects   Nausea No AE identified during assessment   No AE identified during assessment   Nausea   Grade 1       Any new drug interactions?       No   Is the dose as ordered appropriate for the patient?   Yes Yes   Yes   Since the last time we talked, have you been  "hospitalized or used the emergency room?       No       Last PHQ-2 Score on record:       3/9/2023     1:37 PM 6/7/2022     9:37 AM   PHQ-2 ( 1999 Pfizer)   Q1: Little interest or pleasure in doing things 0 0   Q2: Feeling down, depressed or hopeless 0 0   PHQ-2 Score 0 0       Vitals:  BP:   BP Readings from Last 1 Encounters:   09/20/23 138/85     Wt Readings from Last 1 Encounters:   01/11/24 106.6 kg (235 lb)     Estimated body surface area is 2.28 meters squared as calculated from the following:    Height as of 9/15/15: 1.753 m (5' 9\").    Weight as of 1/11/24: 106.6 kg (235 lb).    Assessment/Plan:  Denies new or worse side effects. Continue plan of care.     Adherence: PDC = 1, refills on time each month, no adherence concerns.     Follow-Up:  1/29: labs done at local clinic     Refill Due:  [facility] will deliver Ayvakit Friday 1/19     Bereket Puri, PharmD  Hematology/Oncology Clinical Pharmacist  [facility]  [facility]  [phone]      "

## 2024-01-18 NOTE — ORAL ONC MGMT
Oral Chemotherapy Monitoring Program     Placed call to Ren Yung in follow up of Ayvakit oral chemotherapy.     Left a message requesting a call back. No drug names were mentioned in the voicemail. Will update when response is received.    Kaitlynn Antonio, RylandD  Oral Chemotherapy Monitoring Program  [facility]  [phone]  January 18, 2024

## 2024-01-21 RX ORDER — DUPILUMAB 300 MG/2ML
INJECTION, SOLUTION SUBCUTANEOUS
Qty: 4 ML | Refills: 2 | OUTPATIENT
Start: 2024-01-21

## 2024-01-30 ENCOUNTER — MYC MEDICAL ADVICE (OUTPATIENT)
Dept: ONCOLOGY | Facility: CLINIC | Age: 64
End: 2024-01-30
Payer: COMMERCIAL

## 2024-01-30 NOTE — TELEPHONE ENCOUNTER
Oral Chemotherapy Monitoring Program  Lab Follow Up    Reviewed CBC and CMP lab results from 1/29 in [facility].        11/15/2023    12:00 PM 11/28/2023     1:00 PM 12/28/2023     1:00 PM 1/17/2024    10:00 AM 1/18/2024    10:00 AM 1/18/2024    10:44 AM 1/30/2024    12:00 PM   ORAL CHEMOTHERAPY   Assessment Type Lab Monitoring Lab Monitoring;Monthly Follow up Lab Monitoring Refill Left Voicemail Incoming phone call;Monthly Follow up Lab Monitoring   Diagnosis Code Mastocytosis Mastocytosis Mastocytosis Mastocytosis Mastocytosis Mastocytosis Mastocytosis   Providers Dr. Dell Sharpe   [facility]   Is this patient followed by the [facility] OC team? No No No No No Yes Yes   Drug Name Ayvakit (avapritinib) Ayvakit (avapritinib) Ayvakit (avapritinib) Ayvakit (avapritinib) Ayvakit (avapritinib) Ayvakit (avapritinib) Ayvakit (avapritinib)   Dose 25 mg 25 mg 25 mg 25 mg 25 mg 25 mg 25 mg   Current Schedule Daily Daily Daily Daily Daily Daily Daily   Cycle Details Continuous Continuous Continuous Continuous Continuous Continuous Continuous   Planned next cycle start date      1/24/2024    Doses missed in last 2 weeks  0    0    Adherence Assessment  Adherent    Adherent    Adverse Effects  Nausea No AE identified during assessment   No AE identified during assessment    Nausea  Grade 1        Any new drug interactions?      No    Is the dose as ordered appropriate for the patient?  Yes Yes   Yes    Since the last time we talked, have you been hospitalized or used the emergency room?      No        Labs:  No results found for NA within last 30 days.     No results found for K within last 30 days.     No results found for CA within last 30 days.     No results found for Mag within last 30 days.     No results found for Phos within last 30 days.     No results found for ALBUMIN within last 30 days.     No results found for  BUN within last 30 days.     No results found for CR within last 30 days.     No results found for AST within last 30 days.     No results found for ALT within last 30 days.     No results found for BILITOTAL within last 30 days.     No results found for WBC within last 30 days.     No results found for HGB within last 30 days.     No results found for PLT within last 30 days.     No results found for ANC within last 30 days.     No results found for ANC within last 30 days.        Assessment & Plan:  Results are stable with no concerning abnormalities. Continue Ayvakit as planned.     No outreach made, sent pt MyChart with lab results.    Follow-Up:  2/16: monthly assessment and check to see when next monthly labs are scheduled.     Francheska Quinn PharmD  Hematology/Oncology Clinical Pharmacist  [facility]   [phone]

## 2024-02-12 ENCOUNTER — TELEPHONE (OUTPATIENT)
Dept: ONCOLOGY | Facility: CLINIC | Age: 64
End: 2024-02-12
Payer: COMMERCIAL

## 2024-02-12 NOTE — ORAL ONC MGMT
Oral Chemotherapy Monitoring Program    Subjective/Objective:  Ren Yung is a 63 year old male contacted by phone for a follow-up visit for oral chemotherapy. Ren reports that he has been doing well on therapy, no new or worsening side effects. He still occasionally gets a little nausea but this is controlled with Zofran.        11/28/2023     1:00 PM 12/28/2023     1:00 PM 1/17/2024    10:00 AM 1/18/2024    10:00 AM 1/18/2024    10:44 AM 1/30/2024    12:00 PM 2/12/2024     3:00 PM   ORAL CHEMOTHERAPY   Assessment Type Lab Monitoring;Monthly Follow up Lab Monitoring Refill Left Voicemail Incoming phone call;Monthly Follow up Lab Monitoring Monthly Follow up   Diagnosis Code Mastocytosis Mastocytosis Mastocytosis Mastocytosis Mastocytosis Mastocytosis Mastocytosis   Providers Dr. Dell Sharpe   [facility]   [facility] No No No No Yes Yes Yes   Drug Name Ayvakit (avapritinib) Ayvakit (avapritinib) Ayvakit (avapritinib) Ayvakit (avapritinib) Ayvakit (avapritinib) Ayvakit (avapritinib) Ayvakit (avapritinib)   Dose 25 mg 25 mg 25 mg 25 mg 25 mg 25 mg 25 mg   Current Schedule Daily Daily Daily Daily Daily Daily Daily   Cycle Details Continuous Continuous Continuous Continuous Continuous Continuous Continuous   Planned next cycle start date     1/24/2024     Doses missed in last 2 weeks 0    0  0   Adherence Assessment Adherent    Adherent  Adherent   Adverse Effects Nausea No AE identified during assessment   No AE identified during assessment  No AE identified during assessment   Nausea Grade 1         Any new drug interactions?     No  No   Is the dose as ordered appropriate for the patient? Yes Yes   Yes  Yes   Since the last time we talked, have you been hospitalized or used the emergency room?     No         Last PHQ-2 Score on record:       3/9/2023      "1:37 PM 6/7/2022     9:37 AM   PHQ-2 ( 1999 Pfizer)   Q1: Little interest or pleasure in doing things 0 0   Q2: Feeling down, depressed or hopeless 0 0   PHQ-2 Score 0 0       Vitals:  BP:   BP Readings from Last 1 Encounters:   09/20/23 138/85     Wt Readings from Last 1 Encounters:   01/11/24 106.6 kg (235 lb)     Estimated body surface area is 2.28 meters squared as calculated from the following:    Height as of 9/15/15: 1.753 m (5' 9\").    Weight as of 1/11/24: 106.6 kg (235 lb).    Labs:  No results found for NA within last 30 days.     No results found for K within last 30 days.     No results found for CA within last 30 days.     No results found for Mag within last 30 days.     No results found for Phos within last 30 days.     No results found for ALBUMIN within last 30 days.     No results found for BUN within last 30 days.     No results found for CR within last 30 days.     No results found for AST within last 30 days.     No results found for ALT within last 30 days.     No results found for BILITOTAL within last 30 days.     No results found for WBC within last 30 days.     No results found for HGB within last 30 days.     No results found for PLT within last 30 days.     No results found for ANC within last 30 days.     No results found for ANC within last 30 days.          Assessment/Plan:  Patient is tolerating therapy well, continue treatment as planned.    Follow-Up:  3/12 - monthly assessment    Refill Due:  5/10/24    Jamir Hughes  Oncology Pharmacy Resident  February 12, 2024      "

## 2024-02-29 ENCOUNTER — DOCUMENTATION ONLY (OUTPATIENT)
Dept: ONCOLOGY | Facility: CLINIC | Age: 64
End: 2024-02-29
Payer: COMMERCIAL

## 2024-02-29 NOTE — PHARMACY
Oral Chemotherapy Monitoring Program  Lab Follow Up    Reviewed lab results from 2/26/24.        12/28/2023     1:00 PM 1/17/2024    10:00 AM 1/18/2024    10:00 AM 1/18/2024    10:44 AM 1/30/2024    12:00 PM 2/12/2024     3:00 PM 2/29/2024     8:00 AM   ORAL CHEMOTHERAPY   Assessment Type Lab Monitoring Refill Left Voicemail Incoming phone call;Monthly Follow up Lab Monitoring Monthly Follow up Lab Monitoring   Diagnosis Code Mastocytosis Mastocytosis Mastocytosis Mastocytosis Mastocytosis Mastocytosis Mastocytosis   Providers Dr. Dell Sharpe   [facility]   Is this patient followed by the [facility] OC team? No No No Yes Yes Yes Yes   Drug Name Ayvakit (avapritinib) Ayvakit (avapritinib) Ayvakit (avapritinib) Ayvakit (avapritinib) Ayvakit (avapritinib) Ayvakit (avapritinib) Ayvakit (avapritinib)   Dose 25 mg 25 mg 25 mg 25 mg 25 mg 25 mg 25 mg   Current Schedule Daily Daily Daily Daily Daily Daily Daily   Cycle Details Continuous Continuous Continuous Continuous Continuous Continuous Continuous   Planned next cycle start date    1/24/2024      Doses missed in last 2 weeks    0  0    Adherence Assessment    Adherent  Adherent    Adverse Effects No AE identified during assessment   No AE identified during assessment  No AE identified during assessment No AE identified during assessment   Any new drug interactions?    No  No    Is the dose as ordered appropriate for the patient? Yes   Yes  Yes    Since the last time we talked, have you been hospitalized or used the emergency room?    No          Assessment & Plan:  No concerning abnormalities.    Mychart sent to patient    Follow-Up:  Monthly assessment 3/12    Jamir Hughes  Oncology Pharmacy Resident  February 29, 2024

## 2024-03-13 RX ORDER — METHYLPREDNISOLONE SODIUM SUCCINATE 125 MG/2ML
125 INJECTION, POWDER, LYOPHILIZED, FOR SOLUTION INTRAMUSCULAR; INTRAVENOUS
Start: 2024-03-13

## 2024-03-13 RX ORDER — EPINEPHRINE 1 MG/ML
0.3 INJECTION, SOLUTION, CONCENTRATE INTRAVENOUS EVERY 5 MIN PRN
OUTPATIENT
Start: 2024-03-13

## 2024-03-13 RX ORDER — MEPERIDINE HYDROCHLORIDE 25 MG/ML
25 INJECTION INTRAMUSCULAR; INTRAVENOUS; SUBCUTANEOUS EVERY 30 MIN PRN
OUTPATIENT
Start: 2024-03-13

## 2024-03-13 RX ORDER — ALBUTEROL SULFATE 0.83 MG/ML
2.5 SOLUTION RESPIRATORY (INHALATION)
OUTPATIENT
Start: 2024-03-13

## 2024-03-13 RX ORDER — DIPHENHYDRAMINE HYDROCHLORIDE 50 MG/ML
50 INJECTION INTRAMUSCULAR; INTRAVENOUS
Start: 2024-03-13

## 2024-03-13 RX ORDER — ALBUTEROL SULFATE 90 UG/1
1-2 AEROSOL, METERED RESPIRATORY (INHALATION)
Start: 2024-03-13

## 2024-03-19 ENCOUNTER — TELEPHONE (OUTPATIENT)
Dept: ONCOLOGY | Facility: CLINIC | Age: 64
End: 2024-03-19
Payer: COMMERCIAL

## 2024-03-19 NOTE — TELEPHONE ENCOUNTER
Oral Chemotherapy Monitoring Program    Subjective/Objective:  Ren Yung is a 63 year old male contacted by phone for a follow-up visit for oral chemotherapy.  Ren confirms taking the appropriate dose of Ayvakit 25mg daily . Denies new or worsening side effects, missed doses, and recent hospital or ED visits. Patient has not had any recent medication changes.           1/17/2024    10:00 AM 1/18/2024    10:00 AM 1/18/2024    10:44 AM 1/30/2024    12:00 PM 2/12/2024     3:00 PM 2/29/2024     8:00 AM 3/19/2024     3:00 PM   ORAL CHEMOTHERAPY   Assessment Type Refill Left Voicemail Incoming phone call;Monthly Follow up Lab Monitoring Monthly Follow up Lab Monitoring Refill   Diagnosis Code Mastocytosis Mastocytosis Mastocytosis Mastocytosis Mastocytosis Mastocytosis Mastocytosis   Providers Dr. Dell Sharpe   [facility]   Is this patient followed by the [facility] OC team? No No Yes Yes Yes Yes Yes   Drug Name Ayvakit (avapritinib) Ayvakit (avapritinib) Ayvakit (avapritinib) Ayvakit (avapritinib) Ayvakit (avapritinib) Ayvakit (avapritinib) Ayvakit (avapritinib)   Dose 25 mg 25 mg 25 mg 25 mg 25 mg 25 mg 25 mg   Current Schedule Daily Daily Daily Daily Daily Daily Daily   Cycle Details Continuous Continuous Continuous Continuous Continuous Continuous Continuous   Planned next cycle start date   1/24/2024       Doses missed in last 2 weeks   0  0     Adherence Assessment   Adherent  Adherent     Adverse Effects   No AE identified during assessment  No AE identified during assessment No AE identified during assessment No AE identified during assessment   Any new drug interactions?   No  No  No   Is the dose as ordered appropriate for the patient?   Yes  Yes  Yes   Since the last time we talked, have you been hospitalized or used the emergency room?   No    No       Last PHQ-2 Score on record:        "3/9/2023     1:37 PM 6/7/2022     9:37 AM   PHQ-2 ( 1999 [employer])   Q1: Little interest or pleasure in doing things 0 0   Q2: Feeling down, depressed or hopeless 0 0   PHQ-2 Score 0 0       Vitals:  BP:   BP Readings from Last 1 Encounters:   09/20/23 138/85     Wt Readings from Last 1 Encounters:   01/11/24 106.6 kg (235 lb)     Estimated body surface area is 2.28 meters squared as calculated from the following:    Height as of 9/15/15: 1.753 m (5' 9\").    Weight as of 1/11/24: 106.6 kg (235 lb).    Labs:  No results found for NA within last 30 days.     No results found for K within last 30 days.     No results found for CA within last 30 days.     No results found for Mag within last 30 days.     No results found for Phos within last 30 days.     No results found for ALBUMIN within last 30 days.     No results found for BUN within last 30 days.     No results found for CR within last 30 days.     No results found for AST within last 30 days.     No results found for ALT within last 30 days.     No results found for BILITOTAL within last 30 days.     No results found for WBC within last 30 days.     No results found for HGB within last 30 days.     No results found for PLT within last 30 days.     No results found for ANC within last 30 days.     No results found for ANC within last 30 days.          Assessment/Plan:  Ren is tolerating Ayvakit. Continue therapy as planned.    Follow-Up:  Pt due for monthly labs ~3/25/24  4/15/24: monthly assessment    Refill Due:  [facility] to deliver Ayvakit 3/21    Francheska Quinn PharmD  Hematology/Oncology Clinical Pharmacist  [facility]   [phone]      "

## 2024-03-25 ENCOUNTER — MYC MEDICAL ADVICE (OUTPATIENT)
Dept: ONCOLOGY | Facility: CLINIC | Age: 64
End: 2024-03-25
Payer: COMMERCIAL

## 2024-03-25 NOTE — TELEPHONE ENCOUNTER
Oral Chemotherapy Monitoring Program  Lab Follow Up    Reviewed CBC and CMP lab results from 3/25/24 in [facility] Everywhere.        1/18/2024    10:00 AM 1/18/2024    10:44 AM 1/30/2024    12:00 PM 2/12/2024     3:00 PM 2/29/2024     8:00 AM 3/19/2024     3:00 PM 3/25/2024    11:00 AM   ORAL CHEMOTHERAPY   Assessment Type Left Voicemail Incoming phone call;Monthly Follow up Lab Monitoring Monthly Follow up Lab Monitoring Refill Lab Monitoring   Diagnosis Code Mastocytosis Mastocytosis Mastocytosis Mastocytosis Mastocytosis Mastocytosis Mastocytosis   Providers Dr. Dell Sharpe   [facility]   Is this patient followed by the [facility] OC team? No Yes Yes Yes Yes Yes Yes   Drug Name Ayvakit (avapritinib) Ayvakit (avapritinib) Ayvakit (avapritinib) Ayvakit (avapritinib) Ayvakit (avapritinib) Ayvakit (avapritinib) Ayvakit (avapritinib)   Dose 25 mg 25 mg 25 mg 25 mg 25 mg 25 mg 25 mg   Current Schedule Daily Daily Daily Daily Daily Daily Daily   Cycle Details Continuous Continuous Continuous Continuous Continuous Continuous Continuous   Planned next cycle start date  1/24/2024        Doses missed in last 2 weeks  0  0      Adherence Assessment  Adherent  Adherent      Adverse Effects  No AE identified during assessment  No AE identified during assessment No AE identified during assessment No AE identified during assessment    Any new drug interactions?  No  No  No    Is the dose as ordered appropriate for the patient?  Yes  Yes  Yes    Since the last time we talked, have you been hospitalized or used the emergency room?  No    No        Labs:  No results found for NA within last 30 days.     No results found for K within last 30 days.     No results found for CA within last 30 days.     No results found for Mag within last 30 days.     No results found for Phos within last 30 days.     No results found for  ALBUMIN within last 30 days.     No results found for BUN within last 30 days.     No results found for CR within last 30 days.     No results found for AST within last 30 days.     No results found for ALT within last 30 days.     No results found for BILITOTAL within last 30 days.     No results found for WBC within last 30 days.     No results found for HGB within last 30 days.     No results found for PLT within last 30 days.     No results found for ANC within last 30 days.     No results found for ANC within last 30 days.        Assessment & Plan:  Results are stable with no concerning abnormalities. Continue Ayvakit as planned.    Sent [employer] message with lab results.    Follow-Up:  4/15: monthly assessment and refill  4/22: monthly labs due    Francheska Quinn PharmD  Hematology/Oncology Clinical Pharmacist  [facility]   [phone]

## 2024-04-13 ENCOUNTER — HEALTH MAINTENANCE LETTER (OUTPATIENT)
Age: 64
End: 2024-04-13

## 2024-04-15 ENCOUNTER — TELEPHONE (OUTPATIENT)
Dept: ONCOLOGY | Facility: CLINIC | Age: 64
End: 2024-04-15
Payer: COMMERCIAL

## 2024-04-15 NOTE — ORAL ONC MGMT
Oral Chemotherapy Monitoring Program    Subjective/Objective:  Ren Yung is a 63 year old male contacted by phone for a follow-up visit for oral chemotherapy.  Ren confirms taking the appropriate dose of Ayvakit 25 mg daily. Denies new or worsening side effects, missed doses, and recent hospital or ED visits. Patient has not had any recent medication changes.         1/18/2024    10:44 AM 1/30/2024    12:00 PM 2/12/2024     3:00 PM 2/29/2024     8:00 AM 3/19/2024     3:00 PM 3/25/2024    11:00 AM 4/15/2024     3:00 PM   ORAL CHEMOTHERAPY   Assessment Type Incoming phone call;Monthly Follow up Lab Monitoring Monthly Follow up Lab Monitoring Refill Lab Monitoring Monthly Follow up   Diagnosis Code Mastocytosis Mastocytosis Mastocytosis Mastocytosis Mastocytosis Mastocytosis Mastocytosis   Providers Dr. Dell Sharpe   [facility]   Is this patient followed by the [facility] OC team? Yes Yes Yes Yes Yes Yes Yes   Drug Name Ayvakit (avapritinib) Ayvakit (avapritinib) Ayvakit (avapritinib) Ayvakit (avapritinib) Ayvakit (avapritinib) Ayvakit (avapritinib) Ayvakit (avapritinib)   Dose 25 mg 25 mg 25 mg 25 mg 25 mg 25 mg 25 mg   Current Schedule Daily Daily Daily Daily Daily Daily Daily   Cycle Details Continuous Continuous Continuous Continuous Continuous Continuous Continuous   Planned next cycle start date 1/24/2024         Doses missed in last 2 weeks 0  0    0   Adherence Assessment Adherent  Adherent    Adherent   Adverse Effects No AE identified during assessment  No AE identified during assessment No AE identified during assessment No AE identified during assessment  No AE identified during assessment   Any new drug interactions? No  No  No  No   Is the dose as ordered appropriate for the patient? Yes  Yes  Yes  Yes   Since the last time we talked, have you been hospitalized or used the emergency  "room? No    No  No       Last PHQ-2 Score on record:       3/9/2023     1:37 PM 6/7/2022     9:37 AM   PHQ-2 ( 1999 [employer])   Q1: Little interest or pleasure in doing things 0 0   Q2: Feeling down, depressed or hopeless 0 0   PHQ-2 Score 0 0       Vitals:  BP:   BP Readings from Last 1 Encounters:   09/20/23 138/85     Wt Readings from Last 1 Encounters:   01/11/24 106.6 kg (235 lb)     Estimated body surface area is 2.28 meters squared as calculated from the following:    Height as of 9/15/15: 1.753 m (5' 9\").    Weight as of 1/11/24: 106.6 kg (235 lb).    Labs:  No results found for NA within last 30 days.     No results found for K within last 30 days.     No results found for CA within last 30 days.     No results found for Mag within last 30 days.     No results found for Phos within last 30 days.     No results found for ALBUMIN within last 30 days.     No results found for BUN within last 30 days.     No results found for CR within last 30 days.     No results found for AST within last 30 days.     No results found for ALT within last 30 days.     No results found for BILITOTAL within last 30 days.     No results found for WBC within last 30 days.     No results found for HGB within last 30 days.     No results found for PLT within last 30 days.     No results found for ANC within last 30 days.     No results found for ANC within last 30 days.          Assessment/Plan:  Patient continues to tolerate Ayvakit. Continue therapy as planned.    PDC = 1.00, no adherence concerns.    Follow-Up:  7/11 appt with Dr. Sharpe    Refill Due:  [facility] to deliver Ayvakit on Thurs 4/18    Carmen Agudelo, Pharmacy Student  [facility]  [phone]       "

## 2024-04-26 ENCOUNTER — MYC MEDICAL ADVICE (OUTPATIENT)
Dept: ONCOLOGY | Facility: CLINIC | Age: 64
End: 2024-04-26
Payer: COMMERCIAL

## 2024-04-26 NOTE — TELEPHONE ENCOUNTER
Oral Chemotherapy Monitoring Program  Lab Follow Up    Reviewed CBC and CMP lab results from 4/24 in [facility].        1/30/2024    12:00 PM 2/12/2024     3:00 PM 2/29/2024     8:00 AM 3/19/2024     3:00 PM 3/25/2024    11:00 AM 4/15/2024     3:00 PM 4/26/2024    10:00 AM   ORAL CHEMOTHERAPY   Assessment Type Lab Monitoring Monthly Follow up Lab Monitoring Refill Lab Monitoring Monthly Follow up Lab Monitoring   Diagnosis Code Mastocytosis Mastocytosis Mastocytosis Mastocytosis Mastocytosis Mastocytosis Mastocytosis   Providers Dr. Dell Sharpe   [facility]   Is this patient followed by the [facility] OC team? Yes Yes Yes Yes Yes Yes Yes   Drug Name Ayvakit (avapritinib) Ayvakit (avapritinib) Ayvakit (avapritinib) Ayvakit (avapritinib) Ayvakit (avapritinib) Ayvakit (avapritinib) Ayvakit (avapritinib)   Dose 25 mg 25 mg 25 mg 25 mg 25 mg 25 mg 25 mg   Current Schedule Daily Daily Daily Daily Daily Daily Daily   Cycle Details Continuous Continuous Continuous Continuous Continuous Continuous Continuous   Doses missed in last 2 weeks  0    0    Adherence Assessment  Adherent    Adherent    Adverse Effects  No AE identified during assessment No AE identified during assessment No AE identified during assessment  No AE identified during assessment    Any new drug interactions?  No  No  No    Is the dose as ordered appropriate for the patient?  Yes  Yes  Yes    Since the last time we talked, have you been hospitalized or used the emergency room?    No  No        Labs:  No results found for NA within last 30 days.     No results found for K within last 30 days.     No results found for CA within last 30 days.     No results found for Mag within last 30 days.     No results found for Phos within last 30 days.     No results found for ALBUMIN within last 30 days.     No results found for BUN within last 30 days.     No  results found for CR within last 30 days.     No results found for AST within last 30 days.     No results found for ALT within last 30 days.     No results found for BILITOTAL within last 30 days.     No results found for WBC within last 30 days.     No results found for HGB within last 30 days.     No results found for PLT within last 30 days.     No results found for ANC within last 30 days.     No results found for ANC within last 30 days.        Assessment & Plan:  Results are stable with no concerning abnormalities. Continue Ayvakit as planned    MyChart message to pt with lab results.    Follow-Up:  5/10: Monthly assessment    Francheska Quinn PharmD  Hematology/Oncology Clinical Pharmacist  [facility]   [phone]

## 2024-05-13 ENCOUNTER — TELEPHONE (OUTPATIENT)
Dept: ONCOLOGY | Facility: CLINIC | Age: 64
End: 2024-05-13
Payer: COMMERCIAL

## 2024-05-13 NOTE — TELEPHONE ENCOUNTER
Oral Chemotherapy Monitoring Program    Subjective/Objective:  Ren Yung is a 63 year old male contacted by phone for a follow-up visit for oral chemotherapy.  Ren confirms taking the appropriate dose of 25 mg daily. Denies new or worsening side effects, missed doses, and recent hospital or ED visits. Patient has not had any recent medication changes.         2/12/2024     3:00 PM 2/29/2024     8:00 AM 3/19/2024     3:00 PM 3/25/2024    11:00 AM 4/15/2024     3:00 PM 4/26/2024    10:00 AM 5/13/2024    12:00 PM   ORAL CHEMOTHERAPY   Assessment Type Monthly Follow up Lab Monitoring Refill Lab Monitoring Monthly Follow up Lab Monitoring Monthly Follow up   Diagnosis Code Mastocytosis Mastocytosis Mastocytosis Mastocytosis Mastocytosis Mastocytosis Mastocytosis   Providers Dr. Dell Sharpe   [facility]   Is this patient followed by the [facility] OC team? Yes Yes Yes Yes Yes Yes Yes   Drug Name Ayvakit (avapritinib) Ayvakit (avapritinib) Ayvakit (avapritinib) Ayvakit (avapritinib) Ayvakit (avapritinib) Ayvakit (avapritinib) Ayvakit (avapritinib)   Dose 25 mg 25 mg 25 mg 25 mg 25 mg 25 mg 25 mg   Current Schedule Daily Daily Daily Daily Daily Daily Daily   Cycle Details Continuous Continuous Continuous Continuous Continuous Continuous Continuous   Doses missed in last 2 weeks 0    0  0   Adherence Assessment Adherent    Adherent  Adherent   Adverse Effects No AE identified during assessment No AE identified during assessment No AE identified during assessment  No AE identified during assessment  No AE identified during assessment   Any new drug interactions? No  No  No  No   Is the dose as ordered appropriate for the patient? Yes  Yes  Yes  Yes   Has the patient missed any days of school, work, or other routine activity?       No   Since the last time we talked, have you been hospitalized or used the  "emergency room?   No  No  No       Last PHQ-2 Score on record:       3/9/2023     1:37 PM 6/7/2022     9:37 AM   PHQ-2 ( 1999 [employer])   Q1: Little interest or pleasure in doing things 0 0   Q2: Feeling down, depressed or hopeless 0 0   PHQ-2 Score 0 0       Vitals:  BP:   BP Readings from Last 1 Encounters:   09/20/23 138/85     Wt Readings from Last 1 Encounters:   01/11/24 106.6 kg (235 lb)     Estimated body surface area is 2.28 meters squared as calculated from the following:    Height as of 9/15/15: 1.753 m (5' 9\").    Weight as of 1/11/24: 106.6 kg (235 lb).    Labs:  No results found for NA within last 30 days.     No results found for K within last 30 days.     No results found for CA within last 30 days.     No results found for Mag within last 30 days.     No results found for Phos within last 30 days.     No results found for ALBUMIN within last 30 days.     No results found for BUN within last 30 days.     No results found for CR within last 30 days.     No results found for AST within last 30 days.     No results found for ALT within last 30 days.     No results found for BILITOTAL within last 30 days.     No results found for WBC within last 30 days.     No results found for HGB within last 30 days.     No results found for PLT within last 30 days.     No results found for ANC within last 30 days.     No results found for ANC within last 30 days.          Assessment/Plan:  Ren is tolerating therapy appropriately with no concerns. Plan to continue therapy as prescribed.     Follow-Up:  Ren states that next lab appointment at [facility] on 5/22    Refill Due:  Refill to be delivered on Thursday, May 16th    Vinny Hensley PharmD  Oral Chemotherapy Monitoring Program  [facility]  [phone]      "

## 2024-05-23 ENCOUNTER — DOCUMENTATION ONLY (OUTPATIENT)
Dept: ONCOLOGY | Facility: CLINIC | Age: 64
End: 2024-05-23
Payer: COMMERCIAL

## 2024-05-23 NOTE — PROGRESS NOTES
Oral Chemotherapy Monitoring Program  Lab Follow Up    Reviewed CBC and CMP lab results from 5/22 (in [facility]).        2/29/2024     8:00 AM 3/19/2024     3:00 PM 3/25/2024    11:00 AM 4/15/2024     3:00 PM 4/26/2024    10:00 AM 5/13/2024    12:00 PM 5/23/2024    11:00 AM   ORAL CHEMOTHERAPY   Assessment Type Lab Monitoring Refill Lab Monitoring Monthly Follow up Lab Monitoring Monthly Follow up Lab Monitoring   Diagnosis Code Mastocytosis Mastocytosis Mastocytosis Mastocytosis Mastocytosis Mastocytosis Mastocytosis   Providers Dr. Dell Sharpe   [facility]   Is this patient followed by the [facility] OC team? Yes Yes Yes Yes Yes Yes Yes   Drug Name Ayvakit (avapritinib) Ayvakit (avapritinib) Ayvakit (avapritinib) Ayvakit (avapritinib) Ayvakit (avapritinib) Ayvakit (avapritinib) Ayvakit (avapritinib)   Dose 25 mg 25 mg 25 mg 25 mg 25 mg 25 mg 25 mg   Current Schedule Daily Daily Daily Daily Daily Daily Daily   Cycle Details Continuous Continuous Continuous Continuous Continuous Continuous Continuous   Doses missed in last 2 weeks    0  0    Adherence Assessment    Adherent  Adherent    Adverse Effects No AE identified during assessment No AE identified during assessment  No AE identified during assessment  No AE identified during assessment    Any new drug interactions?  No  No  No    Is the dose as ordered appropriate for the patient?  Yes  Yes  Yes    Has the patient missed any days of school, work, or other routine activity?      No    Since the last time we talked, have you been hospitalized or used the emergency room?  No  No  No        Labs:  No results found for NA within last 30 days.     No results found for K within last 30 days.     No results found for CA within last 30 days.     No results found for Mag within last 30 days.     No results found for Phos within last 30 days.     No results found  for ALBUMIN within last 30 days.     No results found for BUN within last 30 days.     No results found for CR within last 30 days.     No results found for AST within last 30 days.     No results found for ALT within last 30 days.     No results found for BILITOTAL within last 30 days.     No results found for WBC within last 30 days.     No results found for HGB within last 30 days.     No results found for PLT within last 30 days.     No results found for ANC within last 30 days.     No results found for ANC within last 30 days.        Assessment & Plan:  Results are stable with no concerning abnormalities. Continue Ayvakit as planned.    Sending MyChart to pt with lab results.    Follow-Up:  6/19: due for monthly labs  7/11: appt with Dr. Dell Quinn PharmD  Hematology/Oncology Clinical Pharmacist  [facility]   [phone]

## 2024-06-08 NOTE — LETTER
2/23/2022         RE: Ren Yung  [address]  [facility] [address]        Dear Colleague,    Thank you for referring your patient, Ren Yung, to the [facility]. Please see a copy of my visit note below.    Ren is a 61 year old who is being evaluated via a billable video visit.      How would you like to obtain your AVS? MyChart  If the video visit is dropped, the invitation should be resent by: Text to cell phone: [phone]  Will anyone else be joining your video visit? Xuan    Roseanne Moreno VF  Video Start Time: 10:17 am  Video-Visit Details    Type of service:  Video Visit    Video End Time:10:35 am    Originating Location (pt. 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    The trephine core biopsy is adequate. The marrow cellularity is estimated at 50-60%. Occasional large   aggregates of cells with granular cytoplasm are present, consistent with mast cells. Each aggregate contains   more than 15 cells, approximately half of which show spindle morphology.  and mast cell tryptase highlight mast cells, which are scattered   throughout the marrow and can be seen   forming occasional large aggregates, each containing more than 15 cells. The mast cells are weakly positive   for CD2. Together, the scattered and aggregated mast cells represent an estimated 15% of marrow cellularity.    -History of low back pain  -Status post torn meniscus of knee  -Bee sting allergy  -Varicose veins, no history of deep vein thrombosis  -Obesity, BMI 35     History of Present Illness: Mr. Yung is a 59 year old man with a history systemic mastocytosis, seen by video for routine follow-up.  I  "Lea Paulino is a 69 y.o. female on day 5 of admission presenting with Pyelonephritis.    Subjective   Pt was hypoglycemic overnight down to 60 for which D50 IVP was given, this AM, pt is Aox4, feeling thirst and asking for water, denied chest pain, fever, or chills.     Objective     General appearance: Awake and alert and on nasal cannula oxygen  HEENT: Atraumatic and normocephalic  CARDIAC: Regular   LUNGS: Clear  ABDOMEN: Soft and nontender  EXTREMITIES: 2+ edema  SKIN: Ecchymosis and erythema noted over both arms from blood draws.  Right upper arm dual-lumen PICC line noted.     Last Recorded Vitals  Blood pressure 151/70, pulse 78, temperature 36.9 °C (98.4 °F), resp. rate 24, height 1.575 m (5' 2.01\"), weight 97.8 kg (215 lb 9.8 oz), SpO2 99%.  Intake/Output last 3 Shifts:  I/O last 3 completed shifts:  In: 550 (5.6 mL/kg) [I.V.:250 (2.6 mL/kg); IV Piggyback:300]  Out: 2000 (20.5 mL/kg) [Urine:2000 (0.6 mL/kg/hr)]  Weight: 97.8 kg     Relevant Results  Scheduled medications  [Held by provider] carvedilol, 6.25 mg, oral, BID  cycloSPORINE, 100 mg, oral, q12h MARTIN  desvenlafaxine succinate, 100 mg, oral, Daily  levothyroxine, 50 mcg, oral, q AM  lidocaine, 5 mL, infiltration, Once  meropenem, 1 g, intravenous, q12h  mycophenolate, 500 mg, oral, BID  oxygen, , inhalation, Continuous - Inhalation  polyethylene glycol, 17 g, oral, Daily  predniSONE, 5 mg, oral, Daily  sennosides-docusate sodium, 2 tablet, oral, BID      Continuous medications  D5 % and 0.9 % sodium chloride, 50 mL/hr, Last Rate: 50 mL/hr (06/08/24 0924)  heparin, 0-4,500 Units/hr, Last Rate: 1,100 Units/hr (06/08/24 0924)      PRN medications  PRN medications: alteplase, dextrose, dextrose, glucagon, glucagon       Results for orders placed or performed during the hospital encounter of 06/03/24 (from the past 24 hour(s))   POCT GLUCOSE   Result Value Ref Range    POCT Glucose 101 (H) 74 - 99 mg/dL   POCT GLUCOSE   Result Value Ref Range    " "first saw him on 9/30/2020.  He had a bone marrow biopsy and aspirate on 10/16/2020 that showed multifocal dense nodules of mastocytes.  He was started on histamine blockers-cetirizine 10 mg twice daily and famotidine 20 mg twice daily.  He is not sure if this is making any difference in his symptoms.  He says he has \"flareups\" daily which includes his skin turning bright red on his lower back and legs and if he itches them he gets big welts where he scratched and it makes the itching worse.  He has not had any coughing or wheezing.  No abdominal pain, cramping, diarrhea.  He maybe has some early satiety, not sure.    He also says when he sits for a long time he has some pain in his left buttocks that radiates down the back of his leg.  He does have a history of low back pain noted in the chart.  He also says his hands and elbows hurt.  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  He has occasional migraine.    He has been trying to eat low histamine foods.  [facility], which is diamine oxidase.  He only takes this once every 3 to 4 weeks.    He is also taking vitamin D.    Physical exam:    General appearance:  Patient is 61-year-old man in no acute distress.     HEENT:  No pallor, icterus.  Skin looks a bit flushed.  Lungs: Normal respirations, no cough or audible wheezes.:      Skin: Skin a bit flushed on face and arms.  No petechiae or ecchymoses.    The rest of a comprehensive physical examination is deferred due to public health emergency video visit restrictions.      Labs:  [facility] 2/15/2022  WBC 6.3 3.5 - 10.5 x10(9)/L   02/15/2022 8:57 AM [facility] LAB     RBC 4.49 4.32 - 5.72 x10(12)/L   02/15/2022 8:57 AM [facility] LAB     Hemoglobin 13.4 (L) 13.5 - 17.5 g/dL   02/15/2022 8:57 AM [facility] LAB     HCT 42.8 38.8 - 50.0 %   " POCT Glucose 104 (H) 74 - 99 mg/dL   POCT GLUCOSE   Result Value Ref Range    POCT Glucose 100 (H) 74 - 99 mg/dL   POCT GLUCOSE   Result Value Ref Range    POCT Glucose 66 (L) 74 - 99 mg/dL   POCT GLUCOSE   Result Value Ref Range    POCT Glucose 137 (H) 74 - 99 mg/dL   CBC and Auto Differential   Result Value Ref Range    WBC 12.4 (H) 4.4 - 11.3 x10*3/uL    nRBC 0.0 0.0 - 0.0 /100 WBCs    RBC 3.03 (L) 4.00 - 5.20 x10*6/uL    Hemoglobin 10.5 (L) 12.0 - 16.0 g/dL    Hematocrit 35.1 (L) 36.0 - 46.0 %     (H) 80 - 100 fL    MCH 34.7 (H) 26.0 - 34.0 pg    MCHC 29.9 (L) 32.0 - 36.0 g/dL    RDW 16.9 (H) 11.5 - 14.5 %    Platelets 119 (L) 150 - 450 x10*3/uL    Neutrophils % 87.2 40.0 - 80.0 %    Immature Granulocytes %, Automated 1.1 (H) 0.0 - 0.9 %    Lymphocytes % 3.3 13.0 - 44.0 %    Monocytes % 6.5 2.0 - 10.0 %    Eosinophils % 1.7 0.0 - 6.0 %    Basophils % 0.2 0.0 - 2.0 %    Neutrophils Absolute 10.81 (H) 1.20 - 7.70 x10*3/uL    Immature Granulocytes Absolute, Automated 0.13 0.00 - 0.70 x10*3/uL    Lymphocytes Absolute 0.41 (L) 1.20 - 4.80 x10*3/uL    Monocytes Absolute 0.80 0.10 - 1.00 x10*3/uL    Eosinophils Absolute 0.21 0.00 - 0.70 x10*3/uL    Basophils Absolute 0.02 0.00 - 0.10 x10*3/uL   Comprehensive metabolic panel   Result Value Ref Range    Glucose 105 (H) 74 - 99 mg/dL    Sodium 146 (H) 136 - 145 mmol/L    Potassium 3.8 3.5 - 5.3 mmol/L    Chloride 108 (H) 98 - 107 mmol/L    Bicarbonate 30 21 - 32 mmol/L    Anion Gap 12 10 - 20 mmol/L    Urea Nitrogen 38 (H) 6 - 23 mg/dL    Creatinine 1.72 (H) 0.50 - 1.05 mg/dL    eGFR 32 (L) >60 mL/min/1.73m*2    Calcium 7.9 (L) 8.6 - 10.6 mg/dL    Albumin 2.4 (L) 3.4 - 5.0 g/dL    Alkaline Phosphatase 158 (H) 33 - 136 U/L    Total Protein 4.7 (L) 6.4 - 8.2 g/dL    AST 88 (H) 9 - 39 U/L    Bilirubin, Total 0.8 0.0 - 1.2 mg/dL    ALT 51 (H) 7 - 45 U/L   Phosphorus   Result Value Ref Range    Phosphorus 2.3 (L) 2.5 - 4.9 mg/dL   Calcium, ionized   Result Value Ref  02/15/2022 8:57 AM [facility] LAB     MCV 95.3 80.0 - 100.0 fL   02/15/2022 8:57 AM [facility] LAB     MCH 29.8 27.6 - 33.3 pg   02/15/2022 8:57 AM [facility] LAB     MCHC 31.3 (L) 31.5 - 35.2 g/dL   02/15/2022 8:57 AM [facility] LAB     RDW 12.8 11.9 - 15.5 %   02/15/2022 8:57 AM [facility] LAB     Platelets 252 150 - 450 x10(9)/L   02/15/2022 8:57 AM [facility] LAB     Immature Gran % 0.2 0.0 - 0.5 %   02/15/2022 8:57 AM [facility] LAB     Neutrophil Absolute 2.6 1.7 - 7.0 10(9)/L   02/15/2022 8:57 AM [facility] LAB     Lymphocyte Absolute 3.0 1.0 - 4.8 10(9)/L   02/15/2022 8:57 AM [facility] LAB     Monocytes Absolute 0.5 0.2 - 0.9 10(9)/L   02/15/2022 8:57 AM [facility] LAB     Eosinophil Absolute 0.2 0.0 - 0.5 10(9)/L   02/15/2022 8:57 AM [facility] LAB     Basophil Absolute 0.0 0.0 - 0.3 10(9)/L   02/15/2022 8:57 AM [facility] LAB       Ref Range & Units 8 d ago 2/15/2022 Comments   Tryptase Level <=10.9 ug/L 142 High     Comprehensive metabolic panel completely normal.        Assessment and recommendation:  Indolent systemic mastocytosis-he is having some fairly significant symptoms of skin flushing, but at this point is not having many other symptoms to go along with more aggressive systemic mastocytosis.  I think that the buttocks and leg pain is more likely to related to herniated disc than systemic mastocytosis.  He is not having much for GI or pulmonary symptoms.  His CBC is essentially normal, with only a slightly low hemoglobin.  It is hard to know if the hand and elbow achiness is at all related to the systemic mastocytosis versus osteoarthritis.    It would be useful to know if he has any hepatosplenomegaly.  I will arrange for him to undergo complete abdominal ultrasound with Doppler.  If he has hepatosplenomegaly, that would perhaps push me more  Range    POCT Calcium, Ionized 1.12 1.1 - 1.33 mmol/L   Vancomycin   Result Value Ref Range    Vancomycin 20.1 (H) 5.0 - 20.0 ug/mL   Magnesium   Result Value Ref Range    Magnesium 1.85 1.60 - 2.40 mg/dL   Heparin Assay, UFH   Result Value Ref Range    Heparin Unfractionated 0.5 See Comment Below for Therapeutic Ranges IU/mL   POCT GLUCOSE   Result Value Ref Range    POCT Glucose 80 74 - 99 mg/dL   POCT GLUCOSE   Result Value Ref Range    POCT Glucose 89 74 - 99 mg/dL   POCT GLUCOSE   Result Value Ref Range    POCT Glucose 92 74 - 99 mg/dL        Bedside PICC Imaging    Result Date: 6/7/2024  These images are not reportable by radiology and will not be interpreted by  Radiologists.    XR chest 1 view    Result Date: 6/7/2024  Interpreted By:  Dennise Beck and Hofer Lindsay STUDY: XR CHEST 1 VIEW;  6/7/2024 11:10 am   INDICATION: Signs/Symptoms:hypoxia.   COMPARISON: Chest radiograph 06/04/2024, CT chest abdomen pelvis 06/04/2024   ACCESSION NUMBER(S): [id number]   ORDERING CLINICIAN: AYDE LEUNG   FINDINGS: AP radiograph of the chest was provided.   MEDICAL DEVICES: Interval extubation and removal of enteric tube.   CARDIOMEDIASTINAL SILHOUETTE: Cardiomediastinal silhouette is stable in size and configuration. .   LUNGS: Blunting of the bilateral costophrenic angles, right-greater-than-left, with adjacent hazy bibasilar opacities.   ABDOMEN: No remarkable upper abdominal findings.   BONES: No acute osseous changes.       1.  Similar appearance of right pleural effusion with adjacent atelectasis/consolidation. 2. Interval development of a small left pleural effusion with adjacent atelectasis.   I personally reviewed the images/study and resident's interpretation and I agree with the findings as stated by Cherelle Flores MD (resident radiologist). This study was analyzed and interpreted at [facility], [place], [place].   MACRO: None   Signed by: Dennise Beck 6/7/2024  11:52 AM Dictation workstation:   [id number]        Assessment/Plan   Principal Problem:    Pyelonephritis    Lea Paulino is a 69 y.o. female with a history of COPD, renal transplant in 2015 on cyclosporine, mycophenolate and prednisone, HTN, HLD, hypothyroid, hyperparathyroid, HFmEF (45-50%), AFib, PVD, lymphedema, admitted for emphysematous pyelonephritis and acute pancreatitis in the setting of severe hypercalcemia. Initially treated with IV fluids and abx. Transferred to MICU 6/4 for AHRF s/t pulmonary edema and intubated 6/4-6/5 for airway protection. Extubated 6/5, stable on 2.5L transferred back to floor. Transplant ID and nephrology following patient.     6/7 Patient seen this morning at bedside. AOX1, but comfortable appearing. Later notified by RN while rounding that patient was belly breathing and more lethargic. RR called. On initial evaluation, patient diaphoretic with use of abdominal accessory muscles, very edematous in arms. AOx1, but somnolent. Vitals stable, HR in 70s, SBP 120s, SpO2 98%. BS 81, given one amp D50. ECG unchanged from prior. CXR w/ similar appearance of right pleural effusion with adjacent atelectasis/consolidation and interval development of a small left pleural effusion with adjacent atelectasis. ABG pH 7.36/PCO2 46/PaO2 91/SO2 99/lactate 0.4. Given 40 mg IV lasix. Patient became progressively more lethargic throughout, did not flinch/pull away when drawing ABG. Started micafungin and ordered D5 NS for persistent hypoglycemia. Patient placed on BiPAP initially, then airvo 15L, 40%.     Became slightly more responsive with amp D50 and BiPAP. Withdrawing to pain. Answered some questions. Repeat ABG later with pH 7.3/PCO2 56/PaO2 68/SO2 95/lactate 0.6.      Later, woke up with minimal verbal/physical stimuli. Oriented to name, year, location (hospital).     Updates 06/08:  -Aox4 this morning, on 5L NC.   -Speech eval: Failed, will need MBBS, will place dubff for now.  toward considering treatment with no distortion, but at this time, there is no strong indication for chemotherapy.    I will also add cromolyn 200 mg p.o. 4 times a day to see if this helps his symptoms.  He should continue to take the cetirizine, famotidine, vitamin D.    I will have him follow-up in about 6 months and repeat a DEXA scan at that time.    Time: I spent a total of 50 minutes on the day of the visit. Please see the note for further information on patient assessment and treatment.         Again, thank you for allowing me to participate in the care of your patient.      Sincerely,    Yadira Sharpe MD       -Resumed immunosuppressant as per transplant nephrology.   -will get CXR to determine if needs further diuresis vs fluid.   -Watch for refeeding syndrome.   -Will discontinue vanc/Ruby as per ID and continue with Meropenem.          #Lethargy  #AMS  -suspected to be s/t hypoglycemia +/- pulmonary edema  -repeat Cmp, cbc, Mg unremarkable  -ECG unchanged from prior  -CXR w/ similar appearance of right pleural effusion with adjacent atelectasis/consolidation and interval development of a small left pleural effusion with adjacent atelectasis  -ABG pH 7.36/PCO2 46/PaO2 91/SO2 99/lactate 0.4.   -s/p 40 mg IV lasix, plan for additional 40 mg   -c/w micafungin  -c/w D5 NS 50 ml/hr  -f/up blood cultures  -q4 glucose checks     #Hypercalcemia, improving  :: ddx primary hyperparathyroidism w/ remaining parathyroid tissue vs less likely MEN1 given possible recurrent, significant hypoglycemia concerning for insulinoma  :: s/p subtotal hemithyroidectomy & parathyroidectomy in 2006   :: Calcium 15.9 at [facility]  :: Potentially the culprit for her presentation of AMS/confusion, pancreatitis, vomiting, large stool burden and constipation, and kidney stones  :: Given severity, was treated at [facility] with [place] calcitonin 4 units/kg subcutaneously and high-volume IV normal saline   - iPTH appropriately low on admission, repeat elevated at 204  -Vitamin D 25 and D-1,25 levels WNL, pending PTHrp and SPEP, UPEP   -daily ical, phos and mag levels   -hold home calcitriol  -endocrine consulted f/up recs     #Emphysematous pyelonephritis  :: CT A/P with gas in the RLQ transplant kidney collecting system and in the urinary bladder, possible gas-forming infection  :: s/p vanc/zosyn and meropenem/gentamicin at OSH  :: previously grew pan-sensitive Pseudomonas (4/24) and E. faecalis (resistant to gentamicin, intermediate to doxycycline 1/22)  -Bcx 6/3 NGTD  -Ucx 6/3 and 6/6 NGTD  -Transplant ID consulted, appreciate recs:  -c/w  meropenem/vanc  -consider urology c/s for nephrostomy tube if becomes hemodynamically unstable     #Acute pancreatitis  :: likely secondary to hypercalcemia (15.9) vs. medications including cyclosporine (less likely)  :: Lipase 1039, CT findings supportive, low back pain initial presentation    - triglycerides obtained 82  - Pain control with IV medication  - Follow-up random cyclosporine levels  - Strict I/O  - can repeat CT in a few weeks regarding fluid collections     #LAURA on CKD of transplanted kidney (2015), improving  :: Follows with Dr Gay at  for renal transplant   :: On mycophenolate, cyclosporine, prednisone  :: Creatinine on admission to [facility] 2.7 (baseline 1.5-1.7)  :: LAURA likely prerenal in setting of pancreatitis and hypovolemia  -Transplant nephrology following  -continue cyclosporine 100 BID, prednisone 5 mg daily, decrease MMF to 500 BID (home dose 1 g daily)  -send EBV/CMV and BK viral loads     #AHRF s/t pulmonary edema, resolved  #COPD  -intubated in MICU 6/4-6/4, s/t excessive fluid resuscitation for pancreatitis  -on 2.5 L NC  -c/w home inhalers     #Reported nonsustained VT at [facility]  #Hypertension  #HFmrEF  - home metoprolol tartrate 12.5 BID switched to coreg 6.25 mg BID  - Optimize electrolytes K > 4, Mg > 2  - EKG 6/4 -> NSR, Left axis deviation  - Holding Bumex 1 mg for now  - Diurese prn   - Echo- EF 40-45%      #Bipolar Disorder  -Psych consulted   -resume home desvenlafaxine 100mg  -start home clozapine at 25 mg at night, starting 6/7 increase to home dose of 50 mg      #Afib  #Hx of DVT LLE 2022  -c/w coreg 6.25 mg BID  -holding home eliquis, on heparin gtt      #Hypothyroidism  - Continue levothyroxine     F: diuresing  E: replete prn, monitor Ca  N: regular  A: PIV  DVT ppx: heparin gtt for afib     Code status: full  NOK: Son  [phone]        Maki Helm MD

## 2024-06-21 ENCOUNTER — MYC MEDICAL ADVICE (OUTPATIENT)
Dept: ONCOLOGY | Facility: CLINIC | Age: 64
End: 2024-06-21
Payer: COMMERCIAL

## 2024-06-21 NOTE — TELEPHONE ENCOUNTER
Oral Chemotherapy Monitoring Program  Lab Follow Up    Reviewed CBC and CMP lab results from 6/19.        3/19/2024     3:00 PM 3/25/2024    11:00 AM 4/15/2024     3:00 PM 4/26/2024    10:00 AM 5/13/2024    12:00 PM 5/23/2024    11:00 AM 6/21/2024     3:00 PM   ORAL CHEMOTHERAPY   Assessment Type Refill Lab Monitoring Monthly Follow up Lab Monitoring Monthly Follow up Lab Monitoring Lab Monitoring   Diagnosis Code Mastocytosis Mastocytosis Mastocytosis Mastocytosis Mastocytosis Mastocytosis Mastocytosis   Providers Dr. Dell Sharpe   [facility]   Is this patient followed by the [facility] team? Yes Yes Yes Yes Yes Yes Yes   Drug Name Ayvakit (avapritinib) Ayvakit (avapritinib) Ayvakit (avapritinib) Ayvakit (avapritinib) Ayvakit (avapritinib) Ayvakit (avapritinib) Ayvakit (avapritinib)   Dose 25 mg 25 mg 25 mg 25 mg 25 mg 25 mg 25 mg   Current Schedule Daily Daily Daily Daily Daily Daily Daily   Cycle Details Continuous Continuous Continuous Continuous Continuous Continuous Continuous   Doses missed in last 2 weeks   0  0     Adherence Assessment   Adherent  Adherent     Adverse Effects No AE identified during assessment  No AE identified during assessment  No AE identified during assessment     Any new drug interactions? No  No  No     Is the dose as ordered appropriate for the patient? Yes  Yes  Yes     Has the patient missed any days of school, work, or other routine activity?     No     Since the last time we talked, have you been hospitalized or used the emergency room? No  No  No         Labs:  No results found for NA within last 30 days.     No results found for K within last 30 days.     No results found for CA within last 30 days.     No results found for Mag within last 30 days.     No results found for Phos within last 30 days.     No results found for ALBUMIN within last 30 days.     No  "results found for BUN within last 30 days.     No results found for CR within last 30 days.     No results found for AST within last 30 days.     No results found for ALT within last 30 days.     No results found for BILITOTAL within last 30 days.     No results found for WBC within last 30 days.     No results found for HGB within last 30 days.     No results found for PLT within last 30 days.     No results found for ANC within last 30 days.     No results found for ANC within last 30 days.        Assessment & Plan:  Results are stable with no concerning abnormalities. Pt sent Skully Helmetst message to MD on 6/19 asking to possibly decrease his Ayvakit dose (see Skully Helmetst messages) Dr. Sharpe responded: \". The avapritinib does not have any known risk of palpitations. It can cause some fatigue and anemia , but that is usually when given at a much higher dose of 300 mg daily (you are on 25 mg daily) that is used in certain cancers.I recommend that you see your primary care physician for labs to evaluate the anemia- ferritin, iron , TIBC, iron saturation, TSH. Also you have not had a tryptase checked in many months, so tryptase. I see that you had a heart monitor back in February for palpitations; I don't know if your syptoms are different now.   If you want to try holding the avapritinib for a month or else go to every other day to see if it helps, that is ok with me.\"     I sent a Skully Helmetst message to Ren with the lab results and also asked him to let us know what he decides as far as his Ayvakit. .    Follow-Up:  Will look for a response from Ren on 6/25    Francheska Quinn PharmD  Hematology/Oncology Clinical Pharmacist  [facility]   [phone]      "

## 2024-07-08 DIAGNOSIS — D47.02 INDOLENT SYSTEMIC MASTOCYTOSIS: ICD-10-CM

## 2024-07-09 RX ORDER — CROMOLYN SODIUM 100 MG/5ML
200 SOLUTION, CONCENTRATE ORAL 2 TIMES DAILY
Qty: 960 ML | Refills: 3 | Status: SHIPPED | OUTPATIENT
Start: 2024-07-09

## 2024-07-09 NOTE — TELEPHONE ENCOUNTER
Gastrocrom Refill   Last prescribing provider: Dr Sharpe     Last clinic visit date: 1/11/24 Dr Sharpe     Recommendations for requested medication (if none, N/A): Copied from chart note    He has some occasional nausea with the medication. He has decreased the cromolyn from 4x daiy to ~ twice daily, also decreased famotidine and cetirizine.     Any other pertinent information (if none, N/A): n/a    Refilled: Y/N, if NO, why?

## 2024-07-17 ENCOUNTER — VIRTUAL VISIT (OUTPATIENT)
Dept: ONCOLOGY | Facility: CLINIC | Age: 64
End: 2024-07-17
Attending: INTERNAL MEDICINE
Payer: COMMERCIAL

## 2024-07-17 ENCOUNTER — DOCUMENTATION ONLY (OUTPATIENT)
Dept: ONCOLOGY | Facility: CLINIC | Age: 64
End: 2024-07-17

## 2024-07-17 VITALS — BODY MASS INDEX: 35.55 KG/M2 | WEIGHT: 240 LBS | HEIGHT: 69 IN

## 2024-07-17 DIAGNOSIS — D47.02 INDOLENT SYSTEMIC MASTOCYTOSIS: Primary | ICD-10-CM

## 2024-07-17 PROCEDURE — 99214 OFFICE O/P EST MOD 30 MIN: CPT | Mod: 95 | Performed by: INTERNAL MEDICINE

## 2024-07-17 NOTE — LETTER
7/17/2024      Ren Yung  [address]  [facility] [address]      Dear Colleague,    Thank you for referring your patient, Ren Yung, to the [facility]. Please see a copy of my visit note below.    Virtual Visit Details    Type of service:  Video Visit     Originating Location (pt. 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 Main problem is skin rash (urticaria pigmentosa) with pruritus. Started avapritinib 25 mg daily 9/25/23.  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     The trephine core biopsy is adequate. The marrow cellularity is estimated at 50-60%. Occasional large   aggregates of cells with granular cytoplasm are present, consistent with mast cells. Each aggregate contains   more than 15 cells, approximately half of which show spindle morphology.  and mast cell tryptase highlight mast cells, which are scattered   throughout the marrow and can be seen   forming occasional large aggregates, each containing more than 15 cells. The mast cells are weakly positive   for CD2. Together, the scattered and aggregated mast cells represent an estimated 15% of marrow cellularity.     -History of low back pain  -Status post torn meniscus of knee  -Bee sting allergy  -Varicose veins, no history of deep vein thrombosis  -Obesity, BMI 35     History of Present Illness: Mr. Yung is a 62 year old man with a history indolent systemic mastocytosis, seen by video for routine follow-up.He is accompanied by his wife.  He was last seen by video on 1/11/2024.  Started on avapritinib in September 2023. He reports that his itching is much improved. His skin is no longer red, essential back to normal. He is on  cromolyn twice daily, also once a day famotidine and cetirizine. He is no longer on omalizumab or dupilumab    In June he  had an episode of feeling tired and weak dizzy with palpitations.  He had a mild anemia at that time.  He is not having any melena, hematochezia, or other bleeding symptoms.  He was assessed for sleep apnea, and found to have severe sleep apnea, and is being fitted for a CPAP mask today.  He is hoping that we will take care of the problem.    He is going on vacation August 9 through 18, and will arrange to receive the avapritinib before going on vacation.    Physical exam:    General appearance:  Patient is 63 year old man in no acute distress.     HEENT:  No pallor, icterus.   Lungs: Normal respirations, no cough or audible wheezes.     Skin:  No rash, no petechiae or ecchymoses.    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  His symptoms of pruritus are dramatically improved after starting avapritinib 25 mg daily, a  tyrosine kinase inhibitor against PDGFRA and KIT, which has been approved for indolent mastocytosis.He will stay on it indefinitely.  He has not had toxicity- CNS, facial edema, gastrointestinal, marrow suppression.    -Continue monthly CBC with differential    # Palpitations, fatigue-unlikely to be related to the avapritinib.  He has followed-up with his primary care, may be related to severe sleep apnea.    -Starting CPAP today.    RTC 6 months by video  Time: I spent a total of 21 minutes on the day of the visit. Please see the note for further information on patient assessment and treatment.     Yadira Sharpe MD      Again, thank you for allowing me to participate in the care of your patient.        Sincerely,        Yadira Sharpe MD

## 2024-07-17 NOTE — PROGRESS NOTES
Virtual Visit Details    Type of service:  Video Visit     Originating Location (pt. 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 Main problem is skin rash (urticaria pigmentosa) with pruritus. Started avapritinib 25 mg daily 9/25/23.  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     The trephine core biopsy is adequate. The marrow cellularity is estimated at 50-60%. Occasional large   aggregates of cells with granular cytoplasm are present, consistent with mast cells. Each aggregate contains   more than 15 cells, approximately half of which show spindle morphology.  and mast cell tryptase highlight mast cells, which are scattered   throughout the marrow and can be seen   forming occasional large aggregates, each containing more than 15 cells. The mast cells are weakly positive   for CD2. Together, the scattered and aggregated mast cells represent an estimated 15% of marrow cellularity.     -History of low back pain  -Status post torn meniscus of knee  -Bee sting allergy  -Varicose veins, no history of deep vein thrombosis  -Obesity, BMI 35     History of Present Illness: Mr. Yung is a 62 year old man with a history indolent systemic mastocytosis, seen by video for routine follow-up.He is accompanied by his wife.  He was last seen by video on 1/11/2024.  Started on avapritinib in September 2023. He reports that his itching is much improved. His skin is no longer red, essential back to normal. He is on  cromolyn twice daily, also once a day famotidine and cetirizine. He is no longer on omalizumab or dupilumab    In June he had an episode of feeling tired and weak dizzy with palpitations.  He had a mild anemia at that time.  He is not having any melena, hematochezia, or other bleeding symptoms.  He was assessed for sleep apnea, and found to have severe sleep apnea, and  is being fitted for a CPAP mask today.  He is hoping that we will take care of the problem.    He is going on vacation August 9 through 18, and will arrange to receive the avapritinib before going on vacation.    Physical exam:    General appearance:  Patient is 63 year old man in no acute distress.     HEENT:  No pallor, icterus.   Lungs: Normal respirations, no cough or audible wheezes.     Skin:  No rash, no petechiae or ecchymoses.    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  His symptoms of pruritus are dramatically improved after starting avapritinib 25 mg daily, a  tyrosine kinase inhibitor against PDGFRA and KIT, which has been approved for indolent mastocytosis.He will stay on it indefinitely.  He has not had toxicity- CNS, facial edema, gastrointestinal, marrow suppression.    -Continue monthly CBC with differential    # Palpitations, fatigue-unlikely to be related to the avapritinib.  He has followed-up with his primary care, may be related to severe sleep apnea.    -Starting CPAP today.    RTC 6 months by video  Time: I spent a total of 21 minutes on the day of the visit. Please see the note for further information on patient assessment and treatment.     Yadira Sharpe MD

## 2024-07-17 NOTE — NURSING NOTE
Current patient location:  Pt indicated he is in the [facility] in [place] at his daughter. Do not know the address.    Is the patient currently in the state [facility]? YES    Visit mode:VIDEO    If the visit is dropped, the patient can be reconnected by: TELEPHONE VISIT: Phone number: [phone]    Will anyone else be joining the visit? Yes, spouse possibly will be listening in (If patient encounters technical issues they should call [phone]    How would you like to obtain your AVS? MyChart    Are changes needed to the allergy or medication list? No    Are refills needed on medications prescribed by this physician? NO    Reason for visit: RECHECK    Andie GARCIA

## 2024-08-09 ENCOUNTER — TELEPHONE (OUTPATIENT)
Dept: ONCOLOGY | Facility: CLINIC | Age: 64
End: 2024-08-09
Payer: COMMERCIAL

## 2024-08-09 NOTE — TELEPHONE ENCOUNTER
Oral Chemotherapy Monitoring Program     Placed call to patient in follow up of oral chemotherapy. Left message requesting call back. No drug names were mentioned. Will update when response received.     Grace Myhre, PharmD  Hematology/Oncology Clinical Pharmacist  [facility]  [facility]  [phone]

## 2024-08-13 ENCOUNTER — TELEPHONE (OUTPATIENT)
Dept: ONCOLOGY | Facility: CLINIC | Age: 64
End: 2024-08-13
Payer: COMMERCIAL

## 2024-08-13 NOTE — TELEPHONE ENCOUNTER
Oral Chemotherapy Monitoring Program     Placed call to patient in follow up of oral chemotherapy. Left message requesting call back. No drug names were mentioned. Will update when response received.     Francheska Quinn  Hematology/Oncology Clinical Pharmacist  [facility]  [facility]  [phone]      [facility] to set up order,quarterly assessment and reminded pt he's due for monthly labs.    Francheska Quinn PharmD  Hematology/Oncology Clinical Pharmacist  [facility]   [phone]

## 2024-08-22 ENCOUNTER — DOCUMENTATION ONLY (OUTPATIENT)
Dept: ONCOLOGY | Facility: CLINIC | Age: 64
End: 2024-08-22
Payer: COMMERCIAL

## 2024-08-22 ENCOUNTER — TELEPHONE (OUTPATIENT)
Dept: ONCOLOGY | Facility: CLINIC | Age: 64
End: 2024-08-22
Payer: COMMERCIAL

## 2024-08-22 NOTE — PROGRESS NOTES
Oral Chemotherapy Monitoring Program  Lab Follow Up    Reviewed lab results from 8/21/24.        5/13/2024    12:00 PM 5/23/2024    11:00 AM 6/21/2024     3:00 PM 7/17/2024    10:00 AM 8/9/2024    11:00 AM 8/13/2024    10:00 AM 8/22/2024     1:00 PM   ORAL CHEMOTHERAPY   Assessment Type Monthly Follow up Lab Monitoring Lab Monitoring Chart Review Left Voicemail Left Voicemail Lab Monitoring   Diagnosis Code Mastocytosis Mastocytosis Mastocytosis Mastocytosis Mastocytosis Mastocytosis Mastocytosis   Providers Dr. Dell Sharpe   [facility]   Is this patient followed by the [facility] OC team? Yes Yes Yes Yes Yes Yes Yes   Drug Name Ayvakit (avapritinib) Ayvakit (avapritinib) Ayvakit (avapritinib) Ayvakit (avapritinib) Ayvakit (avapritinib) Ayvakit (avapritinib) Ayvakit (avapritinib)   Dose 25 mg 25 mg 25 mg 25 mg 25 mg 25 mg 25 mg   Current Schedule Daily Daily Daily Daily Daily Daily Daily   Cycle Details Continuous Continuous Continuous Continuous Continuous Continuous Continuous   Doses missed in last 2 weeks 0         Adherence Assessment Adherent         Adverse Effects No AE identified during assessment   Increased Creatinine      Increased Creatinine    Grade 1      Pharmacist intervention(Increased creatinine)    No      Any new drug interactions? No         Is the dose as ordered appropriate for the patient? Yes         Has the patient missed any days of school, work, or other routine activity? No         Since the last time we talked, have you been hospitalized or used the emergency room? No             Labs:  No results found for NA within last 30 days.     No results found for K within last 30 days.     No results found for CA within last 30 days.     No results found for Mag within last 30 days.     No results found for Phos within last 30 days.     No results found for ALBUMIN within last 30  days.     No results found for BUN within last 30 days.     No results found for CR within last 30 days.     No results found for AST within last 30 days.     No results found for ALT within last 30 days.     No results found for BILITOTAL within last 30 days.     No results found for WBC within last 30 days.     No results found for HGB within last 30 days.     No results found for PLT within last 30 days.     No results found for ANC within last 30 days.     No results found for ANC within last 30 days.        Assessment & Plan:  No concerning abnormalities. Continue Ayvakit as planned.    Patient was not contacted as [facility] already sent him a message.     Follow-Up:  9/6: Quarterly follow up    Kaitlynn Antonio PharmD  Hematology/Oncology Clinical Pharmacist  [facility]  [phone]

## 2024-08-22 NOTE — TELEPHONE ENCOUNTER
Prior Authorization Approval    Medication: AYVAKIT 25 MG PO TABS  Authorization Effective Date: 7/23/2024  Authorization Expiration Date: 8/22/2025  Approved Dose/Quantity: 30/30 ds  Reference #: Key: [id number]   Insurance Company: [employer] - Phone [phone] Fax [phone]  Expected CoPay: $    CoPay Card Available:      Financial Assistance Needed:   Which Pharmacy is filling the prescription: [facility] - [place], [place] - [address]  Pharmacy Notified:   Patient Notified:

## 2024-09-02 ENCOUNTER — PATIENT OUTREACH (OUTPATIENT)
Dept: ONCOLOGY | Facility: CLINIC | Age: 64
End: 2024-09-02
Payer: COMMERCIAL

## 2024-09-02 NOTE — PROGRESS NOTES
[facility]: Cancer Care                                                                                          Completed chart audit to update Oncology Care Coordination enrollment status.  Reviewed POC and pt has appropriate follow up scheduled.       Signature:  Rosie Hi RN

## 2024-09-09 ENCOUNTER — TELEPHONE (OUTPATIENT)
Dept: ONCOLOGY | Facility: CLINIC | Age: 64
End: 2024-09-09
Payer: COMMERCIAL

## 2024-09-09 NOTE — TELEPHONE ENCOUNTER
Oral Chemotherapy Monitoring Program     Placed call to patient in follow up of oral chemotherapy. Left message requesting call back. No drug names were mentioned. Will update when response received.     Francheska Quinn  Hematology/Oncology Clinical Pharmacist  [facility]  [facility]  [phone]

## 2024-09-10 NOTE — TELEPHONE ENCOUNTER
Oral Chemotherapy Monitoring Program    Subjective/Objective:  Ren Yung is a 63 year old male contacted by phone for a follow-up visit for oral chemotherapy.  Ren confirms taking the appropriate dose of Ayvakit 25mg daily. Denies new or worsening side effects, missed doses, and recent hospital or ED visits. Patient has not had any recent medication changes.           6/21/2024     3:00 PM 7/17/2024    10:00 AM 8/9/2024    11:00 AM 8/13/2024    10:00 AM 8/22/2024     1:00 PM 9/9/2024    11:00 AM 9/10/2024    12:00 PM   ORAL CHEMOTHERAPY   Assessment Type Lab Monitoring Chart Review Left Voicemail Left Voicemail Lab Monitoring Left Voicemail Quarterly Follow up   Diagnosis Code Mastocytosis Mastocytosis Mastocytosis Mastocytosis Mastocytosis Mastocytosis Mastocytosis   Providers Dr. Dell Sharpe   [facility]   Is this patient followed by the [facility] OC team? Yes Yes Yes Yes Yes Yes No   Drug Name Ayvakit (avapritinib) Ayvakit (avapritinib) Ayvakit (avapritinib) Ayvakit (avapritinib) Ayvakit (avapritinib) Ayvakit (avapritinib) Ayvakit (avapritinib)   Dose 25 mg 25 mg 25 mg 25 mg 25 mg 25 mg 25 mg   Current Schedule Daily Daily Daily Daily Daily Daily Daily   Cycle Details Continuous Continuous Continuous Continuous Continuous Continuous Continuous   Doses missed in last 2 weeks       0   Adherence Assessment       Adherent   Adverse Effects  Increased Creatinine     No AE identified during assessment   Increased Creatinine  Grade 1        Pharmacist intervention(Increased creatinine)  No        Any new drug interactions?       No   Is the dose as ordered appropriate for the patient?       Yes   Since the last time we talked, have you been hospitalized or used the emergency room?       No       Last PHQ-2 Score on record:       3/9/2023     1:37 PM 6/7/2022     9:37 AM   PHQ-2 ( 1999 [employer])   Q1:  "Little interest or pleasure in doing things 0 0   Q2: Feeling down, depressed or hopeless 0 0   PHQ-2 Score 0 0       Vitals:  BP:   BP Readings from Last 1 Encounters:   09/20/23 138/85     Wt Readings from Last 1 Encounters:   07/17/24 108.9 kg (240 lb)     Estimated body surface area is 2.3 meters squared as calculated from the following:    Height as of 7/17/24: 1.753 m (5' 9\").    Weight as of 7/17/24: 108.9 kg (240 lb).    Labs:  No results found for NA within last 30 days.     No results found for K within last 30 days.     No results found for CA within last 30 days.     No results found for Mag within last 30 days.     No results found for Phos within last 30 days.     No results found for ALBUMIN within last 30 days.     No results found for BUN within last 30 days.     No results found for CR within last 30 days.     No results found for AST within last 30 days.     No results found for ALT within last 30 days.     No results found for BILITOTAL within last 30 days.     No results found for WBC within last 30 days.     No results found for HGB within last 30 days.     No results found for PLT within last 30 days.     No results found for ANC within last 30 days.     No results found for ANC within last 30 days.          Assessment/Plan:  Pt tolerating Ayvakit. Continue therapy as planned.    Follow-Up:  Due for labs ~9/20 in [place]    Refill Due:  [facility] to del Ayvakit on 9/12    Francheska Quinn PharmD  Hematology/Oncology Clinical Pharmacist  [facility]   [phone]      "

## 2024-10-25 ENCOUNTER — MYC MEDICAL ADVICE (OUTPATIENT)
Dept: ONCOLOGY | Facility: CLINIC | Age: 64
End: 2024-10-25
Payer: COMMERCIAL

## 2024-10-25 NOTE — TELEPHONE ENCOUNTER
[facility]: Cancer Care                                                                                          Pt sent my chart that they did not draw all of his normal labs.  It looks like they just did his CBC.  Lab orders refaxed to [place].        Signature:  Rosie Hi RN

## 2025-01-08 DIAGNOSIS — D47.02 INDOLENT SYSTEMIC MASTOCYTOSIS: ICD-10-CM

## 2025-01-15 ENCOUNTER — VIRTUAL VISIT (OUTPATIENT)
Dept: ONCOLOGY | Facility: CLINIC | Age: 65
End: 2025-01-15
Attending: INTERNAL MEDICINE
Payer: COMMERCIAL

## 2025-01-15 VITALS — BODY MASS INDEX: 36.29 KG/M2 | HEIGHT: 69 IN | WEIGHT: 245 LBS

## 2025-01-15 DIAGNOSIS — D47.02 INDOLENT SYSTEMIC MASTOCYTOSIS: Primary | ICD-10-CM

## 2025-01-15 ASSESSMENT — PAIN SCALES - GENERAL: PAINLEVEL_OUTOF10: MODERATE PAIN (5)

## 2025-01-15 NOTE — NURSING NOTE
Patient reviewed medications and allergies in [employer] during e-check in and said everything looked correct.      Current patient location:  [facility] at daughter's house    Is the patient currently in the state of [place]? YES    Visit mode: VIDEO    If the visit is dropped, the patient can be reconnected by:VIDEO VISIT: Text to cell phone:   Telephone Information:   Mobile [phone]    and VIDEO VISIT: Send to e-mail at: [email or web address]    Will anyone else be joining the visit? NO  (If patient encounters technical issues they should call [phone]    Are changes needed to the allergy or medication list? Pt declined med review and Pt stated no med changes    Are refills needed on medications prescribed by this physician? NO    Rooming Documentation:  Questionnaire(s) completed    Reason for visit: RECHECK (Left knee, follow up.)    Radha TREJOF

## 2025-01-15 NOTE — PROGRESS NOTES
Virtual Visit Details    Type of service:  Video Visit   Video start 9: 15  Video stop 9:20     Originating Location (pt. 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 Main problem is skin rash (urticaria pigmentosa) with pruritus. Started avapritinib 25 mg daily 9/25/23.  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     The trephine core biopsy is adequate. The marrow cellularity is estimated at 50-60%. Occasional large   aggregates of cells with granular cytoplasm are present, consistent with mast cells. Each aggregate contains   more than 15 cells, approximately half of which show spindle morphology.  and mast cell tryptase highlight mast cells, which are scattered   throughout the marrow and can be seen   forming occasional large aggregates, each containing more than 15 cells. The mast cells are weakly positive   for CD2. Together, the scattered and aggregated mast cells represent an estimated 15% of marrow cellularity.     -History of low back pain  -Status post torn meniscus of knee  -Bee sting allergy  -Varicose veins, no history of deep vein thrombosis  -Obesity, BMI 35     History of Present Illness: Mr. Yung is a 64 year old man with a history indolent systemic mastocytosis, seen by video for routine follow-up.He is accompanied by his wife.  He was last seen by video on 7/17/2024.  Started on avapritinib 25 mg po daily in September 2023. He reports that his itching is much improved. His skin is no longer red, essential back to normal. He is on twice daily famotidine and cetirizine.  He is wondering if he can decrease this to once a day perhaps stop.  He is no longer on cromolyn,singulair, hydroxyzine,  omalizumab or dupilumab    He has had some knee pain, received a cortisone shot.    Physical exam:    General appearance:  Patient is 64 year old  man in no acute distress.     HEENT:  No pallor, icterus.   Lungs: Normal respirations, no cough or audible wheezes.      Skin:  No rash, no petechiae or ecchymoses.    Labs:  Care everywhere 1/10/2025  Creatinine 1.0, ALP 81, AST 14, ALT 13, bilirubin 0.6, magnesium 2.2, phosphorus 3.5.  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  His symptoms of pruritus are dramatically improved after starting avapritinib 25 mg daily, a  tyrosine kinase inhibitor against PDGFRA and KIT, which has been approved for indolent mastocytosis.He will stay on it indefinitely.  He has not had toxicity- CNS, facial edema, gastrointestinal, marrow suppression.   -Decrease cetirizine to once a day for 1 month, then if continues doing well can stop.  - Decrease famotidine to once a day  -Decrease frequency of CBC with differential, CMP, magnesium, phosphorus to every 3 months     # Mild anemia-this has been chronic since starting ibrutinib.  No significant downward trend, no further evaluation or interventio needed at this time.    # Fatigue-improved, has obstructive sleep apnea and is on CPAP.     RTC 6 months by video  Time: I spent a total of 30 minutes on the day of the visit. Please see the note for further information on patient assessment and treatment.      Yadira Sharpe MD

## 2025-01-15 NOTE — LETTER
1/15/2025      Ren Yung  [address]  [facility] [address]      Dear Colleague,    Thank you for referring your patient, Ren Yung, to the [facility]. Please see a copy of my visit note below.    Virtual Visit Details    Type of service:  Video Visit   Video start 9: 15  Video stop 9:20     Originating Location (pt. 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 Main problem is skin rash (urticaria pigmentosa) with pruritus. Started avapritinib 25 mg daily 9/25/23.  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     The trephine core biopsy is adequate. The marrow cellularity is estimated at 50-60%. Occasional large   aggregates of cells with granular cytoplasm are present, consistent with mast cells. Each aggregate contains   more than 15 cells, approximately half of which show spindle morphology.  and mast cell tryptase highlight mast cells, which are scattered   throughout the marrow and can be seen   forming occasional large aggregates, each containing more than 15 cells. The mast cells are weakly positive   for CD2. Together, the scattered and aggregated mast cells represent an estimated 15% of marrow cellularity.     -History of low back pain  -Status post torn meniscus of knee  -Bee sting allergy  -Varicose veins, no history of deep vein thrombosis  -Obesity, BMI 35     History of Present Illness: Mr. Yung is a 64 year old man with a history indolent systemic mastocytosis, seen by video for routine follow-up.He is accompanied by his wife.  He was last seen by video on 7/17/2024.  Started on avapritinib 25 mg po daily in September 2023. He reports that his itching is much improved. His skin is no longer red, essential back to normal. He is on twice daily famotidine and cetirizine.  He is wondering if  he can decrease this to once a day perhaps stop.  He is no longer on cromolyn,singulair, hydroxyzine,  omalizumab or dupilumab    He has had some knee pain, received a cortisone shot.    Physical exam:    General appearance:  Patient is 64 year old man in no acute distress.     HEENT:  No pallor, icterus.   Lungs: Normal respirations, no cough or audible wheezes.      Skin:  No rash, no petechiae or ecchymoses.    Labs:  Care everywhere 1/10/2025  Creatinine 1.0, ALP 81, AST 14, ALT 13, bilirubin 0.6, magnesium 2.2, phosphorus 3.5.  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  His symptoms of pruritus are dramatically improved after starting avapritinib 25 mg daily, a  tyrosine kinase inhibitor against PDGFRA and KIT, which has been approved for indolent mastocytosis.He will stay on it indefinitely.  He has not had toxicity- CNS, facial edema, gastrointestinal, marrow suppression.   -Decrease cetirizine to once a day for 1 month, then if continues doing well can stop.  - Decrease famotidine to once a day  -Decrease frequency of CBC with differential, CMP, magnesium, phosphorus to every 3 months     # Mild anemia-this has been chronic since starting ibrutinib.  No significant downward trend, no further evaluation or interventio needed at this time.    # Fatigue-improved, has obstructive sleep apnea and is on CPAP.     RTC 6 months by video  Time: I spent a total of 30 minutes on the day of the visit. Please see the note for further information on patient assessment and treatment.      Yadira Sharpe MD      Again, thank you for allowing me to participate in the care of your patient.        Sincerely,        Yadira Sharpe MD    Electronically signed

## 2025-03-10 ENCOUNTER — PATIENT OUTREACH (OUTPATIENT)
Dept: ONCOLOGY | Facility: CLINIC | Age: 65
End: 2025-03-10
Payer: COMMERCIAL

## 2025-03-10 NOTE — PROGRESS NOTES
[facility]: Cancer Care                                                                                          Completed chart audit to update Oncology Care Coordination enrollment status.  Reviewed POC and pt still needs a 6 month follow-up appt.  IB sent to scheduling.  Signature:  Rosie Hi RN

## 2025-04-19 ENCOUNTER — HEALTH MAINTENANCE LETTER (OUTPATIENT)
Age: 65
End: 2025-04-19

## 2025-06-05 ENCOUNTER — VIRTUAL VISIT (OUTPATIENT)
Dept: ONCOLOGY | Facility: CLINIC | Age: 65
End: 2025-06-05
Attending: INTERNAL MEDICINE
Payer: COMMERCIAL

## 2025-06-05 VITALS — HEIGHT: 69 IN | WEIGHT: 247 LBS | BODY MASS INDEX: 36.58 KG/M2

## 2025-06-05 DIAGNOSIS — D64.9 ANEMIA, UNSPECIFIED TYPE: ICD-10-CM

## 2025-06-05 DIAGNOSIS — D47.02 INDOLENT SYSTEMIC MASTOCYTOSIS: Primary | ICD-10-CM

## 2025-06-05 RX ORDER — MIRABEGRON 50 MG/1
50 TABLET, FILM COATED, EXTENDED RELEASE ORAL DAILY
COMMUNITY
Start: 2025-04-23

## 2025-06-05 RX ORDER — HYDROPHOR 42 G/100G
OINTMENT TOPICAL 2 TIMES DAILY
COMMUNITY
Start: 2025-05-29

## 2025-06-05 RX ORDER — TAMSULOSIN HYDROCHLORIDE 0.4 MG/1
0.4 CAPSULE ORAL 2 TIMES DAILY
COMMUNITY
Start: 2025-03-19 | End: 2026-03-19

## 2025-06-05 RX ORDER — TRIAMCINOLONE ACETONIDE 1 MG/G
OINTMENT TOPICAL 2 TIMES DAILY
COMMUNITY
Start: 2025-05-29

## 2025-06-05 ASSESSMENT — PAIN SCALES - GENERAL: PAINLEVEL_OUTOF10: SEVERE PAIN (7)

## 2025-06-05 NOTE — PROGRESS NOTES
Virtual Visit Details    Type of service:  Video Visit     Originating Location (pt. 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 Main problem is skin rash (urticaria pigmentosa) with pruritus. Started avapritinib 25 mg daily 9/25/23.  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     The trephine core biopsy is adequate. The marrow cellularity is estimated at 50-60%. Occasional large   aggregates of cells with granular cytoplasm are present, consistent with mast cells. Each aggregate contains   more than 15 cells, approximately half of which show spindle morphology.  and mast cell tryptase highlight mast cells, which are scattered   throughout the marrow and can be seen   forming occasional large aggregates, each containing more than 15 cells. The mast cells are weakly positive   for CD2. Together, the scattered and aggregated mast cells represent an estimated 15% of marrow cellularity.     -History of low back pain  -Status post torn meniscus of knee  -Bee sting allergy  -Varicose veins, no history of deep vein thrombosis  -Obesity, BMI 35  -Elevated PSA, following with urology     History of Present Illness: Mr. Yung is a 64 year old man with a history indolent systemic mastocytosis, seen by video for routine follow-up.He is accompanied by his wife.  He was last seen by video on 1/15/25.  Started on avapritinib 25 mg po daily in September 2023.    He reports that his legs have been itching a bit, some tiny bumps.  He is using a steroid cream for it which is helping.  He is taking cetirizine as needed.  He is not taking famotidine.  He has some seasonal allergies and is using Flonase nasal spray daily.    Since I last saw him, he has had significant problems with left knee pain. He will be undergoing left knee replacement on 6/24/25.     Physical  exam:    General appearance:  Patient is 64 year old man in no acute distress.     HEENT:  No pallor, icterus.   Lungs: Normal respirations, no cough or audible wheezes.      Skin:  No rash, no petechiae or ecchymoses on face, neck arms. I did not look at his legs. .    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  His symptoms of pruritus are dramatically improved after starting avapritinib 25 mg daily, a  tyrosine kinase inhibitor against PDGFRA and KIT, which has been approved for indolent mastocytosis.He will stay on it indefinitely.  He has not had toxicity- CNS, facial edema, gastrointestinal, marrow suppression.  He is having some new itching on his legs, not clear if this is mastocytosis or something else.  He should let me know if this is getting worse.  -Continue avapritinib 25 mg daily  - cetirizine prn.  -CBC with differential, CMP, magnesium, phosphorus to every 6 months  -Contact me if rash on legs worsens       # Mild anemia-this has been chronic since starting avapritinib.  No significant downward trend, no further evaluation or interventio needed at this time.  He might drop forward with surgery, so reasonable to check a bit sooner than otherwise to make sure he is not iron deficient.  - Check CBC about 2 months after surgery    # Knee osteoarthritis, upcoming total knee replacement-it is unclear whether the Avapritinib would really have any problems with wound healing.  I do not want help too long, causing a relapse in his rash, but unlikely to have problems if it is held for a week.  -Hold the avapritinib day of surgery and for the 7 days postop, then restart    Labs at local clinic in 2 months, and then  in 6 months  RTC 6 months I'm person or by video    Time: I spent a total of 30 minutes on the day of the visit. Please see the note for further information on patient assessment and treatment.      Yadira Sharpe MD

## 2025-06-05 NOTE — LETTER
6/5/2025      Ren Yung  [address]  [facility] [address]      Dear Colleague,    Thank you for referring your patient, Ren Yung, to the [facility]. Please see a copy of my visit note below.    Virtual Visit Details    Type of service:  Video Visit     Originating Location (pt. 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 Main problem is skin rash (urticaria pigmentosa) with pruritus. Started avapritinib 25 mg daily 9/25/23.  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     The trephine core biopsy is adequate. The marrow cellularity is estimated at 50-60%. Occasional large   aggregates of cells with granular cytoplasm are present, consistent with mast cells. Each aggregate contains   more than 15 cells, approximately half of which show spindle morphology.  and mast cell tryptase highlight mast cells, which are scattered   throughout the marrow and can be seen   forming occasional large aggregates, each containing more than 15 cells. The mast cells are weakly positive   for CD2. Together, the scattered and aggregated mast cells represent an estimated 15% of marrow cellularity.     -History of low back pain  -Status post torn meniscus of knee  -Bee sting allergy  -Varicose veins, no history of deep vein thrombosis  -Obesity, BMI 35  -Elevated PSA, following with urology     History of Present Illness: Mr. Yung is a 64 year old man with a history indolent systemic mastocytosis, seen by video for routine follow-up.He is accompanied by his wife.  He was last seen by video on 1/15/25.  Started on avapritinib 25 mg po daily in September 2023.    He reports that his legs have been itching a bit, some tiny bumps.  He is using a steroid cream for it which is helping.  He is taking cetirizine as needed.  He is  not taking famotidine.  He has some seasonal allergies and is using Flonase nasal spray daily.    Since I last saw him, he has had significant problems with left knee pain. He will be undergoing left knee replacement on 6/24/25.     Physical exam:    General appearance:  Patient is 64 year old man in no acute distress.     HEENT:  No pallor, icterus.   Lungs: Normal respirations, no cough or audible wheezes.      Skin:  No rash, no petechiae or ecchymoses on face, neck arms. I did not look at his legs. .    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  His symptoms of pruritus are dramatically improved after starting avapritinib 25 mg daily, a  tyrosine kinase inhibitor against PDGFRA and KIT, which has been approved for indolent mastocytosis.He will stay on it indefinitely.  He has not had toxicity- CNS, facial edema, gastrointestinal, marrow suppression.  He is having some new itching on his legs, not clear if this is mastocytosis or something else.  He should let me know if this is getting worse.  -Continue avapritinib 25 mg daily  - cetirizine prn.  -CBC with differential, CMP, magnesium, phosphorus to every 6 months  -Contact me if rash on legs worsens       # Mild anemia-this has been chronic since starting avapritinib.  No significant downward trend, no further evaluation or interventio needed at this time.  He might drop forward with surgery, so reasonable to check a bit sooner than otherwise to make sure he is not iron deficient.  - Check CBC about 2 months after surgery    # Knee osteoarthritis, upcoming total knee replacement-it is unclear whether the Avapritinib would really have any problems with wound  healing.  I do not want help too long, causing a relapse in his rash, but unlikely to have problems if it is held for a week.  -Hold the avapritinib day of surgery and for the 7 days postop, then restart    Labs at local clinic in 2 months, and then in 6 months  RTC 6 months I'm person or by video    Time: I spent a total of 30 minutes on the day of the visit. Please see the note for further information on patient assessment and treatment.      Yadira Sharpe MD    Again, thank you for allowing me to participate in the care of your patient.        Sincerely,        Yadira Sharpe MD    Electronically signed

## 2025-06-05 NOTE — NURSING NOTE
Current patient location: [place], [place]    Is the patient currently in the state of [place]? YES    Visit mode: VIDEO    If the visit is dropped, the patient can be reconnected by:VIDEO VISIT: Text to cell phone:   Telephone Information:   Mobile [phone]       Will anyone else be joining the visit? NO  (If patient encounters technical issues they should call [phone]    Are changes needed to the allergy or medication list? Pt stated no changes to allergies and Pt stated no med changes    Are refills needed on medications prescribed by this physician? NO    Rooming Documentation:  Questionnaire(s) completed    Reason for visit: JEAN TREJOF

## 2025-07-01 ENCOUNTER — PATIENT OUTREACH (OUTPATIENT)
Dept: ONCOLOGY | Facility: CLINIC | Age: 65
End: 2025-07-01
Payer: COMMERCIAL

## 2025-07-01 NOTE — PROGRESS NOTES
[facility]: Cancer Care                                                                                          Completed chart audit to update Oncology Care Coordination enrollment status.  Reviewed POC and pt has appropriate follow up scheduled.       Signature:  Rosie Hi RN

## 2025-07-28 ENCOUNTER — TELEPHONE (OUTPATIENT)
Dept: ONCOLOGY | Facility: CLINIC | Age: 65
End: 2025-07-28
Payer: COMMERCIAL

## 2025-07-28 NOTE — TELEPHONE ENCOUNTER
Prior Authorization Approval    Medication: AYVAKIT 25 MG PO TABS  Authorization Effective Date: 6/28/2025  Authorization Expiration Date: 7/28/2026  Approved Dose/Quantity: 30 for 30 days  Reference #: [id number]   Insurance Company: [employer] - Phone [phone] Fax [phone]  Expected CoPay: $    CoPay Card Available:      Financial Assistance Needed: n/a  Which Pharmacy is filling the prescription:    Pharmacy Notified: no, renewal  Patient Notified: no, renewal